# Patient Record
Sex: MALE | Race: WHITE | Employment: UNEMPLOYED | URBAN - METROPOLITAN AREA
[De-identification: names, ages, dates, MRNs, and addresses within clinical notes are randomized per-mention and may not be internally consistent; named-entity substitution may affect disease eponyms.]

---

## 2021-12-06 ENCOUNTER — HOSPITAL ENCOUNTER (OUTPATIENT)
Age: 56
Setting detail: OBSERVATION
Discharge: HOME | End: 2021-12-10
Attending: EMERGENCY MEDICINE | Admitting: INTERNAL MEDICINE
Payer: MEDICAID

## 2021-12-06 ENCOUNTER — APPOINTMENT (OUTPATIENT)
Dept: CT IMAGING | Age: 56
End: 2021-12-06
Attending: EMERGENCY MEDICINE
Payer: MEDICAID

## 2021-12-06 DIAGNOSIS — I95.89 HYPOTENSION DUE TO HYPOVOLEMIA: ICD-10-CM

## 2021-12-06 DIAGNOSIS — R19.7 DIARRHEA, UNSPECIFIED TYPE: Primary | ICD-10-CM

## 2021-12-06 DIAGNOSIS — K52.9 NONINFECTIOUS GASTROENTERITIS, UNSPECIFIED TYPE: ICD-10-CM

## 2021-12-06 DIAGNOSIS — E86.1 HYPOTENSION DUE TO HYPOVOLEMIA: ICD-10-CM

## 2021-12-06 DIAGNOSIS — F41.9 ANXIETY: ICD-10-CM

## 2021-12-06 LAB
ALBUMIN SERPL-MCNC: 3.5 G/DL (ref 3.5–5)
ALBUMIN/GLOB SERPL: 1.2 {RATIO} (ref 1.1–2.2)
ALP SERPL-CCNC: 56 U/L (ref 45–117)
ALT SERPL-CCNC: 27 U/L (ref 12–78)
ANION GAP SERPL CALC-SCNC: 13 MMOL/L (ref 5–15)
AST SERPL W P-5'-P-CCNC: 48 U/L (ref 15–37)
BASOPHILS # BLD: 0 K/UL (ref 0–0.1)
BASOPHILS NFR BLD: 0 % (ref 0–1)
BILIRUB SERPL-MCNC: 1 MG/DL (ref 0.2–1)
BUN SERPL-MCNC: 37 MG/DL (ref 6–20)
BUN/CREAT SERPL: 19 (ref 12–20)
CA-I BLD-MCNC: 8.6 MG/DL (ref 8.5–10.1)
CHLORIDE SERPL-SCNC: 96 MMOL/L (ref 97–108)
CO2 SERPL-SCNC: 21 MMOL/L (ref 21–32)
CREAT SERPL-MCNC: 1.93 MG/DL (ref 0.7–1.3)
DIFFERENTIAL METHOD BLD: ABNORMAL
EOSINOPHIL # BLD: 0 K/UL (ref 0–0.4)
EOSINOPHIL NFR BLD: 0 % (ref 0–7)
ERYTHROCYTE [DISTWIDTH] IN BLOOD BY AUTOMATED COUNT: 14.3 % (ref 11.5–14.5)
GLOBULIN SER CALC-MCNC: 2.9 G/DL (ref 2–4)
GLUCOSE SERPL-MCNC: 115 MG/DL (ref 65–100)
HCT VFR BLD AUTO: 35.7 % (ref 36.6–50.3)
HGB BLD-MCNC: 12.4 G/DL (ref 12.1–17)
IMM GRANULOCYTES # BLD AUTO: 0 K/UL (ref 0–0.04)
IMM GRANULOCYTES NFR BLD AUTO: 1 % (ref 0–0.5)
LACTATE SERPL-SCNC: 1.2 MMOL/L (ref 0.4–2)
LYMPHOCYTES # BLD: 0.7 K/UL (ref 0.8–3.5)
LYMPHOCYTES NFR BLD: 10 % (ref 12–49)
MCH RBC QN AUTO: 33.2 PG (ref 26–34)
MCHC RBC AUTO-ENTMCNC: 34.7 G/DL (ref 30–36.5)
MCV RBC AUTO: 95.7 FL (ref 80–99)
MONOCYTES # BLD: 0.9 K/UL (ref 0–1)
MONOCYTES NFR BLD: 12 % (ref 5–13)
NEUTS SEG # BLD: 5.6 K/UL (ref 1.8–8)
NEUTS SEG NFR BLD: 77 % (ref 32–75)
NRBC # BLD: 0.02 K/UL (ref 0–0.01)
NRBC BLD-RTO: 0.3 PER 100 WBC
PLATELET # BLD AUTO: 145 K/UL (ref 150–400)
PMV BLD AUTO: 10.3 FL (ref 8.9–12.9)
POTASSIUM SERPL-SCNC: 2.7 MMOL/L (ref 3.5–5.1)
PROT SERPL-MCNC: 6.4 G/DL (ref 6.4–8.2)
RBC # BLD AUTO: 3.73 M/UL (ref 4.1–5.7)
SODIUM SERPL-SCNC: 130 MMOL/L (ref 136–145)
TROPONIN-HIGH SENSITIVITY: 17 NG/L (ref 0–76)
WBC # BLD AUTO: 7.3 K/UL (ref 4.1–11.1)

## 2021-12-06 PROCEDURE — 96365 THER/PROPH/DIAG IV INF INIT: CPT

## 2021-12-06 PROCEDURE — 96361 HYDRATE IV INFUSION ADD-ON: CPT

## 2021-12-06 PROCEDURE — 83605 ASSAY OF LACTIC ACID: CPT

## 2021-12-06 PROCEDURE — 93005 ELECTROCARDIOGRAM TRACING: CPT

## 2021-12-06 PROCEDURE — 74176 CT ABD & PELVIS W/O CONTRAST: CPT

## 2021-12-06 PROCEDURE — 85025 COMPLETE CBC W/AUTO DIFF WBC: CPT

## 2021-12-06 PROCEDURE — 80053 COMPREHEN METABOLIC PANEL: CPT

## 2021-12-06 PROCEDURE — 99285 EMERGENCY DEPT VISIT HI MDM: CPT

## 2021-12-06 PROCEDURE — 74011250636 HC RX REV CODE- 250/636: Performed by: EMERGENCY MEDICINE

## 2021-12-06 PROCEDURE — 70450 CT HEAD/BRAIN W/O DYE: CPT

## 2021-12-06 PROCEDURE — 36415 COLL VENOUS BLD VENIPUNCTURE: CPT

## 2021-12-06 PROCEDURE — 96360 HYDRATION IV INFUSION INIT: CPT

## 2021-12-06 PROCEDURE — 84484 ASSAY OF TROPONIN QUANT: CPT

## 2021-12-06 RX ORDER — POTASSIUM CHLORIDE 7.45 MG/ML
10 INJECTION INTRAVENOUS ONCE
Status: COMPLETED | OUTPATIENT
Start: 2021-12-06 | End: 2021-12-07

## 2021-12-06 RX ADMIN — POTASSIUM CHLORIDE 10 MEQ: 7.46 INJECTION, SOLUTION INTRAVENOUS at 23:23

## 2021-12-06 RX ADMIN — SODIUM CHLORIDE 1000 ML: 9 INJECTION, SOLUTION INTRAVENOUS at 21:06

## 2021-12-06 RX ADMIN — SODIUM CHLORIDE 1000 ML: 9 INJECTION, SOLUTION INTRAVENOUS at 21:28

## 2021-12-06 NOTE — Clinical Note
Status[de-identified] INPATIENT [101]   Type of Bed: Remote Telemetry [29]   Cardiac Monitoring Required?: Yes   Inpatient Hospitalization Certified Necessary for the Following Reasons: 1.  Patient Failed outpatient treatment (further clarification in H&P documentation)   Admitting Diagnosis: Colitis [776602]   Admitting Physician: Lavon Vu   Attending Physician: Lavon Vu   Estimated Length of Stay: 3-4 Midnights   Discharge Plan[de-identified] Home with Office Follow-up

## 2021-12-07 PROBLEM — R57.1 HYPOVOLEMIC SHOCK (HCC): Status: ACTIVE | Noted: 2021-12-07

## 2021-12-07 PROBLEM — K52.9 COLITIS: Status: ACTIVE | Noted: 2021-12-07

## 2021-12-07 LAB
AMPHET UR QL SCN: NEGATIVE
ANION GAP SERPL CALC-SCNC: 10 MMOL/L (ref 5–15)
APPEARANCE UR: ABNORMAL
ATRIAL RATE: 94 BPM
ATRIAL RATE: 99 BPM
BACTERIA URNS QL MICRO: NEGATIVE /HPF
BARBITURATES UR QL SCN: NEGATIVE
BENZODIAZ UR QL: NEGATIVE
BILIRUB UR QL: NEGATIVE
BUN SERPL-MCNC: 38 MG/DL (ref 6–20)
BUN/CREAT SERPL: 20 (ref 12–20)
C DIFF TOX GENS STL QL NAA+PROBE: NEGATIVE
CA-I BLD-MCNC: 8.5 MG/DL (ref 8.5–10.1)
CALCULATED P AXIS, ECG09: 20 DEGREES
CALCULATED P AXIS, ECG09: 48 DEGREES
CALCULATED R AXIS, ECG10: -35 DEGREES
CALCULATED R AXIS, ECG10: -37 DEGREES
CALCULATED T AXIS, ECG11: 41 DEGREES
CALCULATED T AXIS, ECG11: 57 DEGREES
CANNABINOIDS UR QL SCN: NEGATIVE
CHLORIDE SERPL-SCNC: 101 MMOL/L (ref 97–108)
CO2 SERPL-SCNC: 23 MMOL/L (ref 21–32)
COCAINE UR QL SCN: NEGATIVE
COLOR UR: ABNORMAL
CREAT SERPL-MCNC: 1.91 MG/DL (ref 0.7–1.3)
DIAGNOSIS, 93000: NORMAL
DIAGNOSIS, 93000: NORMAL
DRUG SCRN COMMENT,DRGCM: NORMAL
GLUCOSE SERPL-MCNC: 95 MG/DL (ref 65–100)
GLUCOSE UR STRIP.AUTO-MCNC: NEGATIVE MG/DL
HGB UR QL STRIP: ABNORMAL
KETONES UR QL STRIP.AUTO: 5 MG/DL
LEUKOCYTE ESTERASE UR QL STRIP.AUTO: NEGATIVE
METHADONE UR QL: NEGATIVE
NITRITE UR QL STRIP.AUTO: NEGATIVE
OPIATES UR QL: NEGATIVE
P-R INTERVAL, ECG05: 154 MS
P-R INTERVAL, ECG05: 158 MS
PCP UR QL: NEGATIVE
PH UR STRIP: 5 [PH] (ref 5–8)
POTASSIUM SERPL-SCNC: 3 MMOL/L (ref 3.5–5.1)
PROT UR STRIP-MCNC: 30 MG/DL
Q-T INTERVAL, ECG07: 374 MS
Q-T INTERVAL, ECG07: 384 MS
QRS DURATION, ECG06: 92 MS
QRS DURATION, ECG06: 94 MS
QTC CALCULATION (BEZET), ECG08: 467 MS
QTC CALCULATION (BEZET), ECG08: 492 MS
RBC #/AREA URNS HPF: ABNORMAL /HPF (ref 0–5)
SODIUM SERPL-SCNC: 134 MMOL/L (ref 136–145)
SP GR UR REFRACTOMETRY: 1.01 (ref 1–1.03)
TROPONIN-HIGH SENSITIVITY: 15 NG/L (ref 0–76)
UROBILINOGEN UR QL STRIP.AUTO: 0.1 EU/DL (ref 0.1–1)
VENTRICULAR RATE, ECG03: 94 BPM
VENTRICULAR RATE, ECG03: 99 BPM
WBC URNS QL MICRO: ABNORMAL /HPF (ref 0–4)

## 2021-12-07 PROCEDURE — 93005 ELECTROCARDIOGRAM TRACING: CPT

## 2021-12-07 PROCEDURE — 96361 HYDRATE IV INFUSION ADD-ON: CPT

## 2021-12-07 PROCEDURE — 96367 TX/PROPH/DG ADDL SEQ IV INF: CPT

## 2021-12-07 PROCEDURE — 74011250637 HC RX REV CODE- 250/637: Performed by: EMERGENCY MEDICINE

## 2021-12-07 PROCEDURE — 65270000029 HC RM PRIVATE

## 2021-12-07 PROCEDURE — G0378 HOSPITAL OBSERVATION PER HR: HCPCS

## 2021-12-07 PROCEDURE — 74011000258 HC RX REV CODE- 258: Performed by: EMERGENCY MEDICINE

## 2021-12-07 PROCEDURE — 80048 BASIC METABOLIC PNL TOTAL CA: CPT

## 2021-12-07 PROCEDURE — 87493 C DIFF AMPLIFIED PROBE: CPT

## 2021-12-07 PROCEDURE — 81001 URINALYSIS AUTO W/SCOPE: CPT

## 2021-12-07 PROCEDURE — 87209 SMEAR COMPLEX STAIN: CPT

## 2021-12-07 PROCEDURE — 96372 THER/PROPH/DIAG INJ SC/IM: CPT

## 2021-12-07 PROCEDURE — 74011250636 HC RX REV CODE- 250/636: Performed by: INTERNAL MEDICINE

## 2021-12-07 PROCEDURE — 74011250637 HC RX REV CODE- 250/637: Performed by: INTERNAL MEDICINE

## 2021-12-07 PROCEDURE — 74011250636 HC RX REV CODE- 250/636: Performed by: EMERGENCY MEDICINE

## 2021-12-07 PROCEDURE — 80307 DRUG TEST PRSMV CHEM ANLYZR: CPT

## 2021-12-07 RX ORDER — FOLIC ACID 1 MG/1
1 TABLET ORAL DAILY
COMMUNITY
End: 2022-06-27

## 2021-12-07 RX ORDER — LOPERAMIDE HYDROCHLORIDE 2 MG/1
2 CAPSULE ORAL AS NEEDED
Status: DISCONTINUED | OUTPATIENT
Start: 2021-12-07 | End: 2021-12-10 | Stop reason: HOSPADM

## 2021-12-07 RX ORDER — LISINOPRIL 40 MG/1
40 TABLET ORAL DAILY
COMMUNITY
End: 2021-12-10

## 2021-12-07 RX ORDER — ONDANSETRON 2 MG/ML
4 INJECTION INTRAMUSCULAR; INTRAVENOUS
Status: DISCONTINUED | OUTPATIENT
Start: 2021-12-07 | End: 2021-12-10 | Stop reason: HOSPADM

## 2021-12-07 RX ORDER — SODIUM CHLORIDE 9 MG/ML
100 INJECTION, SOLUTION INTRAVENOUS CONTINUOUS
Status: DISCONTINUED | OUTPATIENT
Start: 2021-12-07 | End: 2021-12-09

## 2021-12-07 RX ORDER — ENOXAPARIN SODIUM 100 MG/ML
40 INJECTION SUBCUTANEOUS EVERY 24 HOURS
Status: DISCONTINUED | OUTPATIENT
Start: 2021-12-07 | End: 2021-12-10 | Stop reason: HOSPADM

## 2021-12-07 RX ORDER — TRAZODONE HYDROCHLORIDE 50 MG/1
100 TABLET ORAL
Status: DISCONTINUED | OUTPATIENT
Start: 2021-12-08 | End: 2021-12-10 | Stop reason: HOSPADM

## 2021-12-07 RX ORDER — SODIUM CHLORIDE 0.9 % (FLUSH) 0.9 %
5-40 SYRINGE (ML) INJECTION EVERY 8 HOURS
Status: DISCONTINUED | OUTPATIENT
Start: 2021-12-07 | End: 2021-12-07

## 2021-12-07 RX ORDER — LANOLIN ALCOHOL/MO/W.PET/CERES
CREAM (GRAM) TOPICAL DAILY
COMMUNITY
End: 2022-06-27

## 2021-12-07 RX ORDER — SODIUM CHLORIDE 0.9 % (FLUSH) 0.9 %
5-40 SYRINGE (ML) INJECTION AS NEEDED
Status: DISCONTINUED | OUTPATIENT
Start: 2021-12-07 | End: 2021-12-10 | Stop reason: HOSPADM

## 2021-12-07 RX ORDER — HYDROGEN PEROXIDE 3 %
20 SOLUTION, NON-ORAL MISCELLANEOUS DAILY
COMMUNITY
End: 2022-06-27 | Stop reason: DRUGHIGH

## 2021-12-07 RX ORDER — VANCOMYCIN HYDROCHLORIDE 125 MG/1
125 CAPSULE ORAL
Status: COMPLETED | OUTPATIENT
Start: 2021-12-07 | End: 2021-12-07

## 2021-12-07 RX ORDER — DICYCLOMINE HYDROCHLORIDE 20 MG/1
20 TABLET ORAL
Status: DISCONTINUED | OUTPATIENT
Start: 2021-12-07 | End: 2021-12-10 | Stop reason: HOSPADM

## 2021-12-07 RX ORDER — LORAZEPAM 2 MG/1
2 TABLET ORAL DAILY
COMMUNITY
End: 2021-12-10

## 2021-12-07 RX ORDER — POTASSIUM CHLORIDE 750 MG/1
40 TABLET, FILM COATED, EXTENDED RELEASE ORAL EVERY 4 HOURS
Status: COMPLETED | OUTPATIENT
Start: 2021-12-07 | End: 2021-12-07

## 2021-12-07 RX ADMIN — SODIUM CHLORIDE 125 ML/HR: 9 INJECTION, SOLUTION INTRAVENOUS at 03:53

## 2021-12-07 RX ADMIN — POTASSIUM CHLORIDE 40 MEQ: 750 TABLET, FILM COATED, EXTENDED RELEASE ORAL at 13:58

## 2021-12-07 RX ADMIN — TRAZODONE HYDROCHLORIDE 100 MG: 50 TABLET ORAL at 23:55

## 2021-12-07 RX ADMIN — ENOXAPARIN SODIUM 40 MG: 100 INJECTION SUBCUTANEOUS at 10:49

## 2021-12-07 RX ADMIN — LOPERAMIDE HYDROCHLORIDE 2 MG: 2 CAPSULE ORAL at 17:24

## 2021-12-07 RX ADMIN — SODIUM CHLORIDE 1000 ML: 9 INJECTION, SOLUTION INTRAVENOUS at 02:00

## 2021-12-07 RX ADMIN — VANCOMYCIN HYDROCHLORIDE 125 MG: 125 CAPSULE ORAL at 05:26

## 2021-12-07 RX ADMIN — PIPERACILLIN AND TAZOBACTAM 3.38 G: 3; .375 INJECTION, POWDER, LYOPHILIZED, FOR SOLUTION INTRAVENOUS at 03:51

## 2021-12-07 RX ADMIN — POTASSIUM CHLORIDE 40 MEQ: 750 TABLET, FILM COATED, EXTENDED RELEASE ORAL at 10:49

## 2021-12-07 NOTE — PROGRESS NOTES
12/7/21. PCP is Anthony Osman - seen a month ago. Pt lives @ home with his mother Yunier Lyle @ [phone]) . Mother will transport pt home upon discharge. Pt uses no DME/no home health.

## 2021-12-07 NOTE — PROGRESS NOTES
Skin Assessment: Pt. Skin is warm to the touch. Pt. Has edema to his left hand from a fall prior to admission. Pt. Also has bruising to his left knee and abrasion to his right knee due to fall as well. Pt. Had no pressure ulcers noted upon admission.

## 2021-12-07 NOTE — ED NOTES
TRANSFER - OUT REPORT:    Verbal report given to Damion(name) on Luiz Fuentes  being transferred to 5e (unit) for routine progression of care       Report consisted of patients Situation, Background, Assessment and   Recommendations(SBAR). Information from the following report(s) SBAR and ED Summary was reviewed with the receiving nurse. Lines:   Peripheral IV 12/06/21 Right Antecubital (Active)       Peripheral IV 12/06/21 Anterior; Left Forearm (Active)        Opportunity for questions and clarification was provided.       Patient transported with:   [employer]

## 2021-12-07 NOTE — ED TRIAGE NOTES
Pt has weakness, hypotension, standing up and seeing black spots, LOC fall x5 today, Vomiting and Diarrhea x4 days. HX of ETOH daily, no alcohol in  5 days.

## 2021-12-07 NOTE — PROGRESS NOTES
Reason for Admission:  Colitis                     RUR Score:  7%                   Plan for utilizing home health:  None/uses no DME/no home health. PCP: First and Last name:  Palak Zimmerman     Name of Practice:    Are you a current patient: Yes/No: Yes   Approximate date of last visit:1 month ago. Can you participate in a virtual visit with your PCP: Yes?call?has cell phone. Current Advanced Directive/Advance Care Plan: No Order      Healthcare Decision Maker:              Primary Decision Maker: Jerelissa Jerrymaria l - Mother - [phone]                  Transition of Care Plan:     D/C Plan is home with mother & she will transport pt home upon discharge.

## 2021-12-07 NOTE — ED PROVIDER NOTES
EMERGENCY DEPARTMENT HISTORY AND PHYSICAL EXAM      Date: 12/6/2021  Patient Name: Tee Hsu    History of Presenting Illness     Chief Complaint   Patient presents with    Hypotension    Dizziness    Fall       History Provided By: Patient   HPI: Tee Hsu, 60 y.o. male with a past medical history significant No significant past medical history presents to the ED with cc of dizziness. Patient ports associated for days history of nausea vomiting diarrhea, as well as diffuse abdominal pain. Patient denies fevers or chills. Patient states the dizziness occurs when he goes to stand or move, it improves with rest.    There are no other complaints, changes, or physical findings at this time. PCP: None    No current facility-administered medications on file prior to encounter. No current outpatient medications on file prior to encounter. Past History     Past Medical History:  History reviewed. No pertinent past medical history. Past Surgical History:  History reviewed. No pertinent surgical history. Family History:  No family history on file. Social History:  Social History     Tobacco Use    Smoking status: Not on file    Smokeless tobacco: Not on file   Substance Use Topics    Alcohol use: Not on file    Drug use: Not on file       Allergies:  No Known Allergies      Review of Systems   Review of Systems   Constitutional: Negative for chills and fever. HENT: Negative for sinus pressure and sinus pain. Eyes: Negative for photophobia and redness. Respiratory: Negative for shortness of breath and wheezing. Cardiovascular: Negative for chest pain and palpitations. Gastrointestinal: Positive for abdominal pain and nausea. Genitourinary: Negative for flank pain and hematuria. Musculoskeletal: Negative for arthralgias and gait problem. Skin: Negative for color change and pallor. Neurological: Positive for dizziness and weakness.      Review of Systems    Physical Exam   Physical Exam  Constitutional:       General: No acute distress. Appearance: Normal appearance. Not toxic-appearing. HENT:      Head: Normocephalic and atraumatic. Nose: Nose normal.      Mouth/Throat:      Mouth: Mucous membranes are moist.   Eyes:      Extraocular Movements: Extraocular movements intact. Pupils: Pupils are equal, round, and reactive to light. Cardiovascular:      Rate and Rhythm: Normal rate. Pulses: Normal pulses. Pulmonary:      Effort: Pulmonary effort is normal.      Breath sounds: No stridor. Abdominal:      General: Abdomen is flat. There is no distension. Soft without guarding or rebound. Musculoskeletal:         General: Normal range of motion. Cervical back: Normal range of motion and neck supple. Skin:     General: Skin is warm and dry. Capillary Refill: Capillary refill takes less than 2 seconds. Neurological:      General: No focal deficit present. Cranial nerves II through XII intact     Mental Status: Aert and oriented to person, place, and time. Psychiatric:         Mood and Affect: Mood normal.         Behavior: Behavior normal.       Physical Exam    Lab and Diagnostic Study Results     Labs -     Recent Results (from the past 12 hour(s))   CBC WITH AUTOMATED DIFF    Collection Time: 12/06/21  9:13 PM   Result Value Ref Range    WBC 7.3 4.1 - 11.1 K/uL    RBC 3.73 (L) 4.10 - 5.70 M/uL    HGB 12.4 12.1 - 17.0 g/dL    HCT 35.7 (L) 36.6 - 50.3 %    MCV 95.7 80.0 - 99.0 FL    MCH 33.2 26.0 - 34.0 PG    MCHC 34.7 30.0 - 36.5 g/dL    RDW 14.3 11.5 - 14.5 %    PLATELET 197 (L) 775 - 400 K/uL    MPV 10.3 8.9 - 12.9 FL    NRBC 0.3 (H) 0.0  WBC    ABSOLUTE NRBC 0.02 (H) 0.00 - 0.01 K/uL    NEUTROPHILS 77 (H) 32 - 75 %    LYMPHOCYTES 10 (L) 12 - 49 %    MONOCYTES 12 5 - 13 %    EOSINOPHILS 0 0 - 7 %    BASOPHILS 0 0 - 1 %    IMMATURE GRANULOCYTES 1 (H) 0 - 0.5 %    ABS. NEUTROPHILS 5.6 1.8 - 8.0 K/UL    ABS. LYMPHOCYTES 0.7 (L) 0.8 - 3.5 K/UL    ABS. MONOCYTES 0.9 0.0 - 1.0 K/UL    ABS. EOSINOPHILS 0.0 0.0 - 0.4 K/UL    ABS. BASOPHILS 0.0 0.0 - 0.1 K/UL    ABS. IMM. GRANS. 0.0 0.00 - 0.04 K/UL    DF AUTOMATED     METABOLIC PANEL, COMPREHENSIVE    Collection Time: 12/06/21  9:13 PM   Result Value Ref Range    Sodium 130 (L) 136 - 145 mmol/L    Potassium 2.7 (LL) 3.5 - 5.1 mmol/L    Chloride 96 (L) 97 - 108 mmol/L    CO2 21 21 - 32 mmol/L    Anion gap 13 5 - 15 mmol/L    Glucose 115 (H) 65 - 100 mg/dL    BUN 37 (H) 6 - 20 mg/dL    Creatinine 1.93 (H) 0.70 - 1.30 mg/dL    BUN/Creatinine ratio 19 12 - 20      GFR est AA 44 (L) >60 ml/min/1.73m2    GFR est non-AA 36 (L) >60 ml/min/1.73m2    Calcium 8.6 8.5 - 10.1 mg/dL    Bilirubin, total 1.0 0.2 - 1.0 mg/dL    AST (SGOT) 48 (H) 15 - 37 U/L    ALT (SGPT) 27 12 - 78 U/L    Alk. phosphatase 56 45 - 117 U/L    Protein, total 6.4 6.4 - 8.2 g/dL    Albumin 3.5 3.5 - 5.0 g/dL    Globulin 2.9 2.0 - 4.0 g/dL    A-G Ratio 1.2 1.1 - 2.2     LACTIC ACID    Collection Time: 12/06/21  9:15 PM   Result Value Ref Range    Lactic acid 1.2 0.4 - 2.0 mmol/L   TROPONIN-HIGH SENSITIVITY    Collection Time: 12/06/21  9:20 PM   Result Value Ref Range    Troponin-High Sensitivity 17 0 - 76 ng/L       Radiologic Studies -   @lastxrresult@  CT Results  (Last 48 hours)               12/06/21 2324  CT HEAD WO CONT Final result    Impression:      No acute intracranial bleed. Atherosclerosis. Follow-up as clinically indicated. Narrative:  CT head without contrast       Dose reduction: All CT scans at this facility are performed using dose reduction   optimization techniques as appropriate to a performed exam including the   following: Automated exposure control, adjustments of the mA and/or kV according   to patient size, or use of iterative reconstruction technique.        Indication: Dizziness       Findings:       No acute intracranial bleed, mass effect, midline shift or extra-axial fluid collection is identified. Lateral ventricles are normal in size. Gray-white   matter differentiation is maintained. A noncontrast head CT does not exclude the   possibility of an acute ischemic stroke, or subtle parenchymal or vascular   abnormalities. MRI is more sensitive for evaluation of brain parenchyma. No   skull fracture. Mild mucosal thickening/small mucus retention cyst right   maxillary sinus. No fluid in the mastoid air cells. Atherosclerosis. 12/06/21 2324  CT ABD PELV WO CONT Final result    Impression:      Apparent mild colonic wall thickening. Recommend correlation to exclude the   possibility of colitis/enteritis. Fatty liver. Atherosclerosis. Small hiatal hernia. Gynecomastia. Follow-up as   clinically indicated. Please see full report. Narrative:  CT abdomen and pelvis without contrast       Dose reduction: All CT scans at this facility are performed using dose reduction   optimization techniques as appropriate to a performed exam including the   following: Automated exposure control, adjustments of the mA and/or kV according   to patient size, or use of iterative reconstruction technique. INDICATION: Abdominal pain       FINDINGS:       No consolidation in the lung bases. Bilateral gynecomastia. Probable coronary   calcification. Fatty infiltration of the liver. Liver is slightly enlarged. Gallbladder is present without pericholecystic stranding. Spleen and pancreas   are unremarkable on this noncontrast study. Small hiatal hernia. No urinary stone or hydronephrosis on either side. No aneurysm of abdominal   aorta. Mild atherosclerosis. No bowel obstruction. No evidence of diverticulitis or appendicitis. Apparent   mild colonic wall thickening. No abscess. No free intraperitoneal air. Prostate   measures about 4 x 3 cm in diameter containing small calcifications. No free   fluid in the pelvis. No acute fracture in the visualized bony structures.  Cystic areas in the proximal right femur may be due to osteoarthritic changes or other. Tiny periumbilical defect containing fat. CXR Results  (Last 48 hours)    None            Medical Decision Making   - I am the first provider for this patient. - I reviewed the vital signs, available nursing notes, past medical history, past surgical history, family history and social history. - Initial assessment performed. The patients presenting problems have been discussed, and they are in agreement with the care plan formulated and outlined with them. I have encouraged them to ask questions as they arise throughout their visit. Vital Signs-Reviewed the patient's vital signs. Patient Vitals for the past 12 hrs:   Temp Pulse Resp BP SpO2   12/06/21 2328  94 15 (!) 73/52 100 %   12/06/21 2200  85 18 (!) 79/56 100 %   12/06/21 2116  89 18 (!) 71/48 100 %   12/06/21 2052 98.2 °F (36.8 °C) 99 24 (!) 61/47 100 %           Disposition   Disposition: Admitted to Floor Medical Floor the case was discussed with the admitting physician Dr Liam Hardin    Admitted      Diagnosis     Clinical Impression:   1. Diarrhea, unspecified type    2. Hypotension due to hypovolemia    3. Noninfectious gastroenteritis, unspecified type        Attestations:    Guerda Junior MD    Please note that this dictation was completed with [employer]ot[employer], the computer voice recognition software. Quite often unanticipated grammatical, syntax, homophones, and other interpretive errors are inadvertently transcribed by the computer software. Please disregard these errors. Please excuse any errors that have escaped final proofreading. Thank you.

## 2021-12-07 NOTE — ACP (ADVANCE CARE PLANNING)
Advance Care Planning   Healthcare Decision Maker:       Primary Decision Maker: Marijane Klinefelter - Mother - [phone]

## 2021-12-07 NOTE — H&P
History and Physical    Patient: Deretha Bumpers MRN: [id number]  SSN: [id number]    YOB: 1965  Age: 60 y.o. Sex: male      Subjective:      Deretha Bumpers is a 60 y.o. male who developed diarrhea and vomiting at home multiple times with syncopal episodes emergency department patient was very hypotensive started on multiple bags of fluids. Patient admits to eating out. Patient had syncopal attacks multiple denies any fever or chills denies passing any blood abdominal CT showed colitis    History reviewed. No pertinent past medical history. History reviewed. No pertinent surgical history. No family history on file. Social History     Tobacco Use    Smoking status: Not on file    Smokeless tobacco: Not on file   Substance Use Topics    Alcohol use: Not on file      Prior to Admission medications    Not on File        No Known Allergies    Review of Systems:  A comprehensive review of systems was negative except for that written in the History of Present Illness. Objective:     Vitals:    12/07/21 0430 12/07/21 0530 12/07/21 0640 12/07/21 0800   BP: (!) 82/59 (!) 80/61 (!) 89/55 (P) 98/72   Pulse: 91 85 79 (P) 86   Resp: 14 17 14 (P) 16   Temp:       SpO2: 99% 98% 100% (P) 99%   Weight:       Height:            Physical Exam:  General:  Alert, cooperative, no distress, appears stated age. Eyes:  Conjunctivae/corneas clear. PERRL, EOMs intact. Fundi benign   Ears:  Normal TMs and external ear canals both ears. Nose: Nares normal. Septum midline. Mucosa normal. No drainage or sinus tenderness. Mouth/Throat: Lips, mucosa, and tongue normal. Teeth and gums normal.   Neck: Supple, symmetrical, trachea midline, no adenopathy, thyroid: no enlargment/tenderness/nodules, no carotid bruit and no JVD. Back:   Symmetric, no curvature. ROM normal. No CVA tenderness. Lungs:   Clear to auscultation bilaterally.    Heart:  Regular rate and rhythm, S1, S2 normal, no murmur, click, rub or gallop. Abdomen:   Soft, non-tender. Bowel sounds normal. No masses,  No organomegaly. Extremities: Extremities normal, atraumatic, no cyanosis or edema. Pulses: 2+ and symmetric all extremities. Skin: Skin color, texture, turgor normal. No rashes or lesions   Lymph nodes: Cervical, supraclavicular, and axillary nodes normal.   Neurologic: CNII-XII intact. Normal strength, sensation and reflexes throughout. Recent Results (from the past 24 hour(s))   EKG, 12 LEAD, INITIAL    Collection Time: 12/06/21  8:51 PM   Result Value Ref Range    Ventricular Rate 99 BPM    Atrial Rate 99 BPM    P-R Interval 154 ms    QRS Duration 94 ms    Q-T Interval 384 ms    QTC Calculation (Bezet) 492 ms    Calculated P Axis 48 degrees    Calculated R Axis -37 degrees    Calculated T Axis 57 degrees    Diagnosis       Normal sinus rhythm  Possible Left atrial enlargement  Left axis deviation  Low voltage QRS  Prolonged QT  Abnormal ECG  No previous ECGs available  Confirmed by Kia Kim ([id number]) on 12/7/2021 9:06:36 AM     CBC WITH AUTOMATED DIFF    Collection Time: 12/06/21  9:13 PM   Result Value Ref Range    WBC 7.3 4.1 - 11.1 K/uL    RBC 3.73 (L) 4.10 - 5.70 M/uL    HGB 12.4 12.1 - 17.0 g/dL    HCT 35.7 (L) 36.6 - 50.3 %    MCV 95.7 80.0 - 99.0 FL    MCH 33.2 26.0 - 34.0 PG    MCHC 34.7 30.0 - 36.5 g/dL    RDW 14.3 11.5 - 14.5 %    PLATELET 876 (L) 016 - 400 K/uL    MPV 10.3 8.9 - 12.9 FL    NRBC 0.3 (H) 0.0  WBC    ABSOLUTE NRBC 0.02 (H) 0.00 - 0.01 K/uL    NEUTROPHILS 77 (H) 32 - 75 %    LYMPHOCYTES 10 (L) 12 - 49 %    MONOCYTES 12 5 - 13 %    EOSINOPHILS 0 0 - 7 %    BASOPHILS 0 0 - 1 %    IMMATURE GRANULOCYTES 1 (H) 0 - 0.5 %    ABS. NEUTROPHILS 5.6 1.8 - 8.0 K/UL    ABS. LYMPHOCYTES 0.7 (L) 0.8 - 3.5 K/UL    ABS. MONOCYTES 0.9 0.0 - 1.0 K/UL    ABS. EOSINOPHILS 0.0 0.0 - 0.4 K/UL    ABS. BASOPHILS 0.0 0.0 - 0.1 K/UL    ABS. IMM.  GRANS. 0.0 0.00 - 0.04 K/UL    DF AUTOMATED     METABOLIC PANEL, COMPREHENSIVE Collection Time: 12/06/21  9:13 PM   Result Value Ref Range    Sodium 130 (L) 136 - 145 mmol/L    Potassium 2.7 (LL) 3.5 - 5.1 mmol/L    Chloride 96 (L) 97 - 108 mmol/L    CO2 21 21 - 32 mmol/L    Anion gap 13 5 - 15 mmol/L    Glucose 115 (H) 65 - 100 mg/dL    BUN 37 (H) 6 - 20 mg/dL    Creatinine 1.93 (H) 0.70 - 1.30 mg/dL    BUN/Creatinine ratio 19 12 - 20      GFR est AA 44 (L) >60 ml/min/1.73m2    GFR est non-AA 36 (L) >60 ml/min/1.73m2    Calcium 8.6 8.5 - 10.1 mg/dL    Bilirubin, total 1.0 0.2 - 1.0 mg/dL    AST (SGOT) 48 (H) 15 - 37 U/L    ALT (SGPT) 27 12 - 78 U/L    Alk. phosphatase 56 45 - 117 U/L    Protein, total 6.4 6.4 - 8.2 g/dL    Albumin 3.5 3.5 - 5.0 g/dL    Globulin 2.9 2.0 - 4.0 g/dL    A-G Ratio 1.2 1.1 - 2.2     LACTIC ACID    Collection Time: 12/06/21  9:15 PM   Result Value Ref Range    Lactic acid 1.2 0.4 - 2.0 mmol/L   TROPONIN-HIGH SENSITIVITY    Collection Time: 12/06/21  9:20 PM   Result Value Ref Range    Troponin-High Sensitivity 17 0 - 76 ng/L   EKG, 12 LEAD, SUBSEQUENT    Collection Time: 12/07/21  4:09 AM   Result Value Ref Range    Ventricular Rate 94 BPM    Atrial Rate 94 BPM    P-R Interval 158 ms    QRS Duration 92 ms    Q-T Interval 374 ms    QTC Calculation (Bezet) 467 ms    Calculated P Axis 20 degrees    Calculated R Axis -35 degrees    Calculated T Axis 41 degrees    Diagnosis       Normal sinus rhythm  Left axis deviation  Low voltage QRS  Otherwise normal ECG  When compared with ECG of 06-DEC-2021 20:51, (Unconfirmed)  No significant change was found    Confirmed by Melita Leblanc ([id number]) on 12/7/2021 9:19:26 AM       CT Results  (Last 48 hours)               12/06/21 2324  CT HEAD WO CONT Final result    Impression:      No acute intracranial bleed. Atherosclerosis. Follow-up as clinically indicated.        Narrative:  CT head without contrast       Dose reduction: All CT scans at this facility are performed using dose reduction   optimization techniques as appropriate to a performed exam including the   following: Automated exposure control, adjustments of the mA and/or kV according   to patient size, or use of iterative reconstruction technique. Indication: Dizziness       Findings:       No acute intracranial bleed, mass effect, midline shift or extra-axial fluid   collection is identified. Lateral ventricles are normal in size. Gray-white   matter differentiation is maintained. A noncontrast head CT does not exclude the   possibility of an acute ischemic stroke, or subtle parenchymal or vascular   abnormalities. MRI is more sensitive for evaluation of brain parenchyma. No   skull fracture. Mild mucosal thickening/small mucus retention cyst right   maxillary sinus. No fluid in the mastoid air cells. Atherosclerosis. 12/06/21 2324  CT ABD PELV WO CONT Final result    Impression:      Apparent mild colonic wall thickening. Recommend correlation to exclude the   possibility of colitis/enteritis. Fatty liver. Atherosclerosis. Small hiatal hernia. Gynecomastia. Follow-up as   clinically indicated. Please see full report. Narrative:  CT abdomen and pelvis without contrast       Dose reduction: All CT scans at this facility are performed using dose reduction   optimization techniques as appropriate to a performed exam including the   following: Automated exposure control, adjustments of the mA and/or kV according   to patient size, or use of iterative reconstruction technique. INDICATION: Abdominal pain       FINDINGS:       No consolidation in the lung bases. Bilateral gynecomastia. Probable coronary   calcification. Fatty infiltration of the liver. Liver is slightly enlarged. Gallbladder is present without pericholecystic stranding. Spleen and pancreas   are unremarkable on this noncontrast study. Small hiatal hernia. No urinary stone or hydronephrosis on either side. No aneurysm of abdominal   aorta. Mild atherosclerosis. No bowel obstruction. No evidence of diverticulitis or appendicitis. Apparent   mild colonic wall thickening. No abscess. No free intraperitoneal air. Prostate   measures about 4 x 3 cm in diameter containing small calcifications. No free   fluid in the pelvis. No acute fracture in the visualized bony structures. Cystic   areas in the proximal right femur may be due to osteoarthritic changes or other. Tiny periumbilical defect containing fat.                    Assessment:     Hospital Problems  Never Reviewed          Codes Class Noted POA    Colitis ICD-10-CM: K52.9  ICD-9-CM: 558.9  12/7/2021 Unknown            Possible acute gastroenteritis  Hypovolemic shock  Possible food poisoning  Acute renal failure possible  Plan:     continue with IV fluids  Replenish electrolyte loss  Empiric treatment with Flagyl  Critical care time of 30 minutes    Signed By: Osmin Ennis MD     December 7, 2021

## 2021-12-08 LAB
ANION GAP SERPL CALC-SCNC: 8 MMOL/L (ref 5–15)
BUN SERPL-MCNC: 25 MG/DL (ref 6–20)
BUN/CREAT SERPL: 34 (ref 12–20)
CA-I BLD-MCNC: 8.9 MG/DL (ref 8.5–10.1)
CHLORIDE SERPL-SCNC: 109 MMOL/L (ref 97–108)
CO2 SERPL-SCNC: 23 MMOL/L (ref 21–32)
CREAT SERPL-MCNC: 0.74 MG/DL (ref 0.7–1.3)
ERYTHROCYTE [DISTWIDTH] IN BLOOD BY AUTOMATED COUNT: 14 % (ref 11.5–14.5)
GLUCOSE SERPL-MCNC: 91 MG/DL (ref 65–100)
HCT VFR BLD AUTO: 32.4 % (ref 36.6–50.3)
HGB BLD-MCNC: 11.2 G/DL (ref 12.1–17)
MCH RBC QN AUTO: 33.3 PG (ref 26–34)
MCHC RBC AUTO-ENTMCNC: 34.6 G/DL (ref 30–36.5)
MCV RBC AUTO: 96.4 FL (ref 80–99)
NRBC # BLD: 0 K/UL (ref 0–0.01)
NRBC BLD-RTO: 0 PER 100 WBC
PLATELET # BLD AUTO: 138 K/UL (ref 150–400)
PMV BLD AUTO: 10.1 FL (ref 8.9–12.9)
POTASSIUM SERPL-SCNC: 3.1 MMOL/L (ref 3.5–5.1)
RBC # BLD AUTO: 3.36 M/UL (ref 4.1–5.7)
SODIUM SERPL-SCNC: 140 MMOL/L (ref 136–145)
WBC # BLD AUTO: 4.4 K/UL (ref 4.1–11.1)

## 2021-12-08 PROCEDURE — 74011250637 HC RX REV CODE- 250/637: Performed by: INTERNAL MEDICINE

## 2021-12-08 PROCEDURE — 96372 THER/PROPH/DIAG INJ SC/IM: CPT

## 2021-12-08 PROCEDURE — 65270000029 HC RM PRIVATE

## 2021-12-08 PROCEDURE — 80048 BASIC METABOLIC PNL TOTAL CA: CPT

## 2021-12-08 PROCEDURE — 74011250636 HC RX REV CODE- 250/636: Performed by: INTERNAL MEDICINE

## 2021-12-08 PROCEDURE — 36415 COLL VENOUS BLD VENIPUNCTURE: CPT

## 2021-12-08 PROCEDURE — 85027 COMPLETE CBC AUTOMATED: CPT

## 2021-12-08 PROCEDURE — G0378 HOSPITAL OBSERVATION PER HR: HCPCS

## 2021-12-08 RX ORDER — POTASSIUM CHLORIDE 750 MG/1
40 TABLET, FILM COATED, EXTENDED RELEASE ORAL DAILY
Status: DISCONTINUED | OUTPATIENT
Start: 2021-12-09 | End: 2021-12-09

## 2021-12-08 RX ORDER — CHLORDIAZEPOXIDE HYDROCHLORIDE 25 MG/1
25 CAPSULE, GELATIN COATED ORAL
Status: DISCONTINUED | OUTPATIENT
Start: 2021-12-08 | End: 2021-12-10 | Stop reason: HOSPADM

## 2021-12-08 RX ADMIN — ENOXAPARIN SODIUM 40 MG: 100 INJECTION SUBCUTANEOUS at 10:09

## 2021-12-08 RX ADMIN — TRAZODONE HYDROCHLORIDE 100 MG: 50 TABLET ORAL at 21:23

## 2021-12-08 RX ADMIN — SODIUM CHLORIDE 100 ML/HR: 9 INJECTION, SOLUTION INTRAVENOUS at 17:15

## 2021-12-08 NOTE — PROGRESS NOTES
Patient mother disclosed that the patient Venkatesh Gonzalez has problem with alcohol and that she wanted resources to hopefully refer her son to. Patient mother states that patient is in total denial and chooses not talk about it. Nurse reached out to CM and they gave me a resource packet to give to the mother.

## 2021-12-09 LAB
ANION GAP SERPL CALC-SCNC: 9 MMOL/L (ref 5–15)
BUN SERPL-MCNC: 16 MG/DL (ref 6–20)
BUN/CREAT SERPL: 26 (ref 12–20)
CA-I BLD-MCNC: 9.3 MG/DL (ref 8.5–10.1)
CHLORIDE SERPL-SCNC: 108 MMOL/L (ref 97–108)
CO2 SERPL-SCNC: 24 MMOL/L (ref 21–32)
CREAT SERPL-MCNC: 0.62 MG/DL (ref 0.7–1.3)
GLUCOSE SERPL-MCNC: 90 MG/DL (ref 65–100)
POTASSIUM SERPL-SCNC: 2.9 MMOL/L (ref 3.5–5.1)
SODIUM SERPL-SCNC: 141 MMOL/L (ref 136–145)

## 2021-12-09 PROCEDURE — 74011250636 HC RX REV CODE- 250/636: Performed by: INTERNAL MEDICINE

## 2021-12-09 PROCEDURE — 96372 THER/PROPH/DIAG INJ SC/IM: CPT

## 2021-12-09 PROCEDURE — 65270000029 HC RM PRIVATE

## 2021-12-09 PROCEDURE — G0378 HOSPITAL OBSERVATION PER HR: HCPCS

## 2021-12-09 PROCEDURE — 36415 COLL VENOUS BLD VENIPUNCTURE: CPT

## 2021-12-09 PROCEDURE — 96361 HYDRATE IV INFUSION ADD-ON: CPT

## 2021-12-09 PROCEDURE — 80048 BASIC METABOLIC PNL TOTAL CA: CPT

## 2021-12-09 PROCEDURE — 74011250637 HC RX REV CODE- 250/637: Performed by: INTERNAL MEDICINE

## 2021-12-09 RX ORDER — AMLODIPINE BESYLATE 5 MG/1
5 TABLET ORAL DAILY
Status: DISCONTINUED | OUTPATIENT
Start: 2021-12-10 | End: 2021-12-10 | Stop reason: HOSPADM

## 2021-12-09 RX ADMIN — ENOXAPARIN SODIUM 40 MG: 100 INJECTION SUBCUTANEOUS at 09:47

## 2021-12-09 RX ADMIN — SODIUM CHLORIDE 100 ML/HR: 9 INJECTION, SOLUTION INTRAVENOUS at 03:21

## 2021-12-09 RX ADMIN — TRAZODONE HYDROCHLORIDE 100 MG: 50 TABLET ORAL at 23:19

## 2021-12-09 RX ADMIN — POTASSIUM CHLORIDE 40 MEQ: 750 TABLET, EXTENDED RELEASE ORAL at 09:47

## 2021-12-09 NOTE — PROGRESS NOTES
Progress Note    Patient: Orville Taylor MRN: [id number]  SSN: [id number]    YOB: 1965  Age: 60 y.o. Sex: male      Admit Date: 12/6/2021    LOS: 1 day     Subjective:     60years old with acute gastroenteritis and volume depletion report of history of alcohol use patient claims to have some improvement    Objective:     Vitals:    12/08/21 0400 12/08/21 0845 12/08/21 1501 12/08/21 1916   BP:  128/84 131/86 (!) 153/98   Pulse: 85 94 90 98   Resp:  18 18 18   Temp:  97.6 °F (36.4 °C) 97.9 °F (36.6 °C) 97.9 °F (36.6 °C)   SpO2:  97% 98% 99%   Weight:       Height:            Intake and Output:  Current Shift: No intake/output data recorded. Last three shifts: No intake/output data recorded. Physical Exam:   General:  Alert, cooperative, no distress, appears stated age. Eyes:  Conjunctivae/corneas clear. PERRL, EOMs intact. Fundi benign   Ears:  Normal TMs and external ear canals both ears. Nose: Nares normal. Septum midline. Mucosa normal. No drainage or sinus tenderness. Mouth/Throat: Lips, mucosa, and tongue normal. Teeth and gums normal.   Neck: Supple, symmetrical, trachea midline, no adenopathy, thyroid: no enlargment/tenderness/nodules, no carotid bruit and no JVD. Back:   Symmetric, no curvature. ROM normal. No CVA tenderness. Lungs:   Clear to auscultation bilaterally. Heart:  Regular rate and rhythm, S1, S2 normal, no murmur, click, rub or gallop. Abdomen:   Soft, non-tender. Bowel sounds normal. No masses,  No organomegaly. Extremities: Extremities normal, atraumatic, no cyanosis or edema. Pulses: 2+ and symmetric all extremities. Skin: Skin color, texture, turgor normal. No rashes or lesions   Lymph nodes: Cervical, supraclavicular, and axillary nodes normal.   Neurologic: CNII-XII intact. Normal strength, sensation and reflexes throughout. Lab/Data Review: All lab results for the last 24 hours reviewed.      Recent Results (from the past 24 hour(s)) METABOLIC PANEL, BASIC    Collection Time: 12/08/21  7:19 AM   Result Value Ref Range    Sodium 140 136 - 145 mmol/L    Potassium 3.1 (L) 3.5 - 5.1 mmol/L    Chloride 109 (H) 97 - 108 mmol/L    CO2 23 21 - 32 mmol/L    Anion gap 8 5 - 15 mmol/L    Glucose 91 65 - 100 mg/dL    BUN 25 (H) 6 - 20 mg/dL    Creatinine 0.74 0.70 - 1.30 mg/dL    BUN/Creatinine ratio 34 (H) 12 - 20      GFR est AA >60 >60 ml/min/1.73m2    GFR est non-AA >60 >60 ml/min/1.73m2    Calcium 8.9 8.5 - 10.1 mg/dL   CBC W/O DIFF    Collection Time: 12/08/21  7:19 AM   Result Value Ref Range    WBC 4.4 4.1 - 11.1 K/uL    RBC 3.36 (L) 4.10 - 5.70 M/uL    HGB 11.2 (L) 12.1 - 17.0 g/dL    HCT 32.4 (L) 36.6 - 50.3 %    MCV 96.4 80.0 - 99.0 FL    MCH 33.3 26.0 - 34.0 PG    MCHC 34.6 30.0 - 36.5 g/dL    RDW 14.0 11.5 - 14.5 %    PLATELET 895 (L) 373 - 400 K/uL    MPV 10.1 8.9 - 12.9 FL    NRBC 0.0 0.0  WBC    ABSOLUTE NRBC 0.00 0.00 - 0.01 K/uL         Assessment:     Active Problems:    Colitis (12/7/2021)      Hypovolemic shock ([address] (12/7/2021)    Volume depletion    Plan:      For possible discharge in a.m. and outpatient follow-up with GI    Signed By: Glory Dias MD     December 8, 2021

## 2021-12-09 NOTE — PROGRESS NOTES
Chart reviewed. CM will continue to follow patient and recs of medical team.     10:15A: CM met with patient at bedside regarding the hospital having his correct insurance information. Patient has [employer] card with him in the room. CM confirmed the same information was listed in the computer. Patient verbalized understanding and had no further questions.

## 2021-12-09 NOTE — PROGRESS NOTES
[facility] Report included SBAR, MAR, Kardex, Intake/Output, and Accordion.

## 2021-12-09 NOTE — PROGRESS NOTES
Progress Note    Patient: Zheng Uribe MRN: [id number]  SSN: [id number]    YOB: 1965  Age: 60 y.o. Sex: male      Admit Date: 12/6/2021    LOS: 2 days     Subjective:   60years old. Gastroenteritis, history of alcohol abuse he was better wants  diet improved    Objective:     Vitals:    12/08/21 1916 12/08/21 2259 12/09/21 0745 12/09/21 1443   BP: (!) 153/98 (!) 159/103 (!) 164/98 (!) 149/99   Pulse: 98 80 95 86   Resp: 18 18 16 18   Temp: 97.9 °F (36.6 °C) 97.9 °F (36.6 °C) 97.7 °F (36.5 °C) 98 °F (36.7 °C)   SpO2: 99% 96% 98% 98%   Weight:       Height:            Intake and Output:  Current Shift: No intake/output data recorded. Last three shifts: No intake/output data recorded. Physical Exam:   General:  Alert, cooperative, no distress, appears stated age. Eyes:  Conjunctivae/corneas clear. PERRL, EOMs intact. Fundi benign   Ears:  Normal TMs and external ear canals both ears. Nose: Nares normal. Septum midline. Mucosa normal. No drainage or sinus tenderness. Mouth/Throat: Lips, mucosa, and tongue normal. Teeth and gums normal.   Neck: Supple, symmetrical, trachea midline, no adenopathy, thyroid: no enlargment/tenderness/nodules, no carotid bruit and no JVD. Back:   Symmetric, no curvature. ROM normal. No CVA tenderness. Lungs:   Clear to auscultation bilaterally. Heart:  Regular rate and rhythm, S1, S2 normal, no murmur, click, rub or gallop. Abdomen:   Soft, non-tender. Bowel sounds normal. No masses,  No organomegaly. Extremities: Extremities normal, atraumatic, no cyanosis or edema. Pulses: 2+ and symmetric all extremities. Skin: Skin color, texture, turgor normal. No rashes or lesions   Lymph nodes: Cervical, supraclavicular, and axillary nodes normal.   Neurologic: CNII-XII intact. Normal strength, sensation and reflexes throughout. Lab/Data Review: All lab results for the last 24 hours reviewed.          Assessment:     Active Problems:    Colitis (12/7/2021)      Hypovolemic shock ([facility] [address] (12/7/2021)    Gastroenteritis    Hypertension  Plan:     Diet advance  Possible discharge in a.m.   Adjust medications    Signed By: Juan J Barron MD     December 9, 2021

## 2021-12-09 NOTE — PROGRESS NOTES
Problem: Falls - Risk of  Goal: *Absence of Falls  Description: Document Kaylan Zhong Fall Risk and appropriate interventions in the flowsheet. Outcome: Progressing Towards Goal  Note: Fall Risk Interventions:                      History of Falls Interventions: Investigate reason for fall         Problem: Patient Education: Go to Patient Education Activity  Goal: Patient/Family Education  Outcome: Progressing Towards Goal     Problem: Patient Education:  Go to Education Activity  Goal: Patient/Family Education  Outcome: Progressing Towards Goal     Problem: Patient Education: Go to Patient Education Activity  Goal: Patient/Family Education  Outcome: Progressing Towards Goal     Problem: Patient Education:  Go to Education Activity  Goal: Patient/Family Education  Outcome: Progressing Towards Goal     Problem: Risk for Spread of Infection  Goal: Prevent transmission of infectious organism to others  Description: Prevent the transmission of infectious organisms to other patients, staff members, and visitors.   Outcome: Resolved/Met

## 2021-12-09 NOTE — PROGRESS NOTES
Case management needs to discuss with the patient about billing concerns. Patient is no longer with [employer], he is currently with [employer].

## 2021-12-10 VITALS
TEMPERATURE: 98.1 F | HEART RATE: 113 BPM | HEIGHT: 67 IN | SYSTOLIC BLOOD PRESSURE: 128 MMHG | RESPIRATION RATE: 18 BRPM | OXYGEN SATURATION: 97 % | BODY MASS INDEX: 25.9 KG/M2 | WEIGHT: 165 LBS | DIASTOLIC BLOOD PRESSURE: 88 MMHG

## 2021-12-10 LAB
ANION GAP SERPL CALC-SCNC: 7 MMOL/L (ref 5–15)
BUN SERPL-MCNC: 10 MG/DL (ref 6–20)
BUN/CREAT SERPL: 16 (ref 12–20)
CA-I BLD-MCNC: 9.6 MG/DL (ref 8.5–10.1)
CHLORIDE SERPL-SCNC: 105 MMOL/L (ref 97–108)
CO2 SERPL-SCNC: 29 MMOL/L (ref 21–32)
CREAT SERPL-MCNC: 0.62 MG/DL (ref 0.7–1.3)
GLUCOSE SERPL-MCNC: 96 MG/DL (ref 65–100)
O+P SPEC MICRO: NORMAL
O+P STL CONC: NORMAL
POTASSIUM SERPL-SCNC: 3.1 MMOL/L (ref 3.5–5.1)
SODIUM SERPL-SCNC: 141 MMOL/L (ref 136–145)
SPECIMEN SOURCE: NORMAL

## 2021-12-10 PROCEDURE — G0378 HOSPITAL OBSERVATION PER HR: HCPCS

## 2021-12-10 PROCEDURE — 36415 COLL VENOUS BLD VENIPUNCTURE: CPT

## 2021-12-10 PROCEDURE — 80048 BASIC METABOLIC PNL TOTAL CA: CPT

## 2021-12-10 PROCEDURE — 74011250637 HC RX REV CODE- 250/637: Performed by: INTERNAL MEDICINE

## 2021-12-10 PROCEDURE — 96361 HYDRATE IV INFUSION ADD-ON: CPT

## 2021-12-10 PROCEDURE — 93005 ELECTROCARDIOGRAM TRACING: CPT

## 2021-12-10 RX ORDER — DICYCLOMINE HYDROCHLORIDE 20 MG/1
20 TABLET ORAL
Qty: 30 TABLET | Refills: 0 | Status: SHIPPED | OUTPATIENT
Start: 2021-12-10 | End: 2022-06-27

## 2021-12-10 RX ORDER — LOPERAMIDE HYDROCHLORIDE 2 MG/1
2 CAPSULE ORAL AS NEEDED
Qty: 15 CAPSULE | Refills: 0 | Status: SHIPPED | OUTPATIENT
Start: 2021-12-10 | End: 2021-12-10

## 2021-12-10 RX ORDER — DICYCLOMINE HYDROCHLORIDE 20 MG/1
20 TABLET ORAL
Qty: 30 TABLET | Refills: 0 | Status: SHIPPED | OUTPATIENT
Start: 2021-12-10 | End: 2021-12-10

## 2021-12-10 RX ORDER — AMLODIPINE BESYLATE 5 MG/1
5 TABLET ORAL DAILY
Qty: 30 TABLET | Refills: 0 | Status: SHIPPED | OUTPATIENT
Start: 2021-12-11 | End: 2021-12-10

## 2021-12-10 RX ORDER — LOPERAMIDE HYDROCHLORIDE 2 MG/1
2 CAPSULE ORAL AS NEEDED
Qty: 15 CAPSULE | Refills: 0 | Status: SHIPPED | OUTPATIENT
Start: 2021-12-10 | End: 2021-12-20

## 2021-12-10 RX ORDER — TRAZODONE HYDROCHLORIDE 100 MG/1
100 TABLET ORAL
Qty: 10 TABLET | Refills: 0 | Status: SHIPPED | OUTPATIENT
Start: 2021-12-10 | End: 2022-06-27

## 2021-12-10 RX ORDER — TRAZODONE HYDROCHLORIDE 100 MG/1
100 TABLET ORAL
Qty: 10 TABLET | Refills: 0 | Status: SHIPPED | OUTPATIENT
Start: 2021-12-10 | End: 2021-12-10

## 2021-12-10 RX ORDER — CHLORDIAZEPOXIDE HYDROCHLORIDE 25 MG/1
25 CAPSULE, GELATIN COATED ORAL
Qty: 30 CAPSULE | Refills: 0 | Status: SHIPPED | OUTPATIENT
Start: 2021-12-10 | End: 2022-06-27

## 2021-12-10 RX ORDER — POTASSIUM CHLORIDE 750 MG/1
40 TABLET, FILM COATED, EXTENDED RELEASE ORAL
Status: COMPLETED | OUTPATIENT
Start: 2021-12-10 | End: 2021-12-10

## 2021-12-10 RX ORDER — AMLODIPINE BESYLATE 5 MG/1
5 TABLET ORAL DAILY
Qty: 30 TABLET | Refills: 0 | Status: SHIPPED | OUTPATIENT
Start: 2021-12-11 | End: 2022-06-27

## 2021-12-10 RX ORDER — CHLORDIAZEPOXIDE HYDROCHLORIDE 25 MG/1
25 CAPSULE, GELATIN COATED ORAL
Qty: 30 CAPSULE | Refills: 0 | Status: SHIPPED | OUTPATIENT
Start: 2021-12-10 | End: 2021-12-10

## 2021-12-10 RX ADMIN — POTASSIUM CHLORIDE 40 MEQ: 750 TABLET, FILM COATED, EXTENDED RELEASE ORAL at 11:44

## 2021-12-10 RX ADMIN — AMLODIPINE BESYLATE 5 MG: 5 TABLET ORAL at 08:07

## 2021-12-10 NOTE — PROGRESS NOTES
Problem: Falls - Risk of  Goal: *Absence of Falls  Description: Document Kumar Sol Fall Risk and appropriate interventions in the flowsheet.   Outcome: Progressing Towards Goal  Note: Fall Risk Interventions:            Medication Interventions: Patient to call before getting OOB         History of Falls Interventions: Bed/chair exit alarm

## 2021-12-10 NOTE — DISCHARGE SUMMARY
Discharge Summary     Patient: Janette Haywood MRN: [id number]  SSN: [id number]    YOB: 1965  Age: 60 y.o. Sex: male       Admit Date: 12/6/2021    Discharge Date: 12/10/2021      Admission Diagnoses: Colitis [K52.9]  Hypovolemic shock ([facility] [address] [R57.1]    Discharge Diagnoses:   Problem List as of 12/10/2021 Never Reviewed          Codes Class Noted - Resolved    Colitis ICD-10-CM: K52.9  ICD-9-CM: 558.9  12/7/2021 - Present        Hypovolemic shock ([address] ICD-10-CM: R57.1  ICD-9-CM: 785.59  12/7/2021 - Present               Discharge Condition: Good    Hospital Course: Patient was admitted for severe gastroenteritis with volume depletion and hypokalemia patient has a history of heavy alcohol use was placed on alcohol detox protocol. He was also treated for hypovolemic shock and a CT of the abdomen showed colitis. Patient is being discharged in a stable state vital signs are stable with primary medical physician    Consults: None    Significant Diagnostic Studies: labs:       CT HEAD WO CONT   Final Result      No acute intracranial bleed. Atherosclerosis. Follow-up as clinically indicated. CT ABD PELV WO CONT   Final Result      Apparent mild colonic wall thickening. Recommend correlation to exclude the   possibility of colitis/enteritis. Fatty liver. Atherosclerosis. Small hiatal hernia. Gynecomastia. Follow-up as   clinically indicated. Please see full report. Disposition: home    Discharge Medications:   Current Discharge Medication List      START taking these medications    Details   amLODIPine (NORVASC) 5 mg tablet Take 1 Tablet by mouth daily. Qty: 30 Tablet, Refills: 0      chlordiazePOXIDE (LIBRIUM) 25 mg capsule Take 1 Capsule by mouth every four (4) hours as needed for Anxiety.  Max Daily Amount: 150 mg.  Qty: 30 Capsule, Refills: 0    Associated Diagnoses: Anxiety      dicyclomine (BENTYL) 20 mg tablet Take 1 Tablet by mouth every six (6) hours as needed for Pain.  Qty: 30 Tablet, Refills: 0      loperamide (IMODIUM) 2 mg capsule Take 1 Capsule by mouth as needed for Diarrhea (After each loose BM) for up to 10 days. Qty: 15 Capsule, Refills: 0      traZODone (DESYREL) 100 mg tablet Take 1 Tablet by mouth nightly. Qty: 10 Tablet, Refills: 0         CONTINUE these medications which have NOT CHANGED    Details   folic acid (FOLVITE) 1 mg tablet Take 1 mg by mouth daily. thiamine HCL (Vitamin B-1) 100 mg tablet Take  by mouth daily. esomeprazole (NexIUM) 20 mg capsule Take 20 mg by mouth daily.          STOP taking these medications       LORazepam (Ativan) 2 mg tablet Comments:   Reason for Stopping:         lisinopriL (PRINIVIL, ZESTRIL) 40 mg tablet Comments:   Reason for Stopping:               Activity: Activity as tolerated  Diet: Cardiac Diet  Wound Care: None needed    Follow-up Appointments   Procedures    FOLLOW UP VISIT Appointment in: 3 - 5 Days     Standing Status:   Standing     Number of Occurrences:   1     Order Specific Question:   Appointment in     Answer:   3 - 5 Days     30 Minutes discharge time  Signed By: Glory Dias MD     December 10, 2021

## 2021-12-10 NOTE — PROGRESS NOTES
D/C Plan:    -d/c home  -PCP follow up        Patient scheduled to discharge home today. Aware to follow up w/PCP for this inpatient admission. Clear to discharge from SW standpoint.          MERE Deleon

## 2021-12-10 NOTE — INTERDISCIPLINARY ROUNDS
Patient discharged home with self care. IV was removed and telemetry was taken off. Discharge instructions were reviewed with patient by Miguel Severance, RN. Medications were reviewed. Patient wheeled out via wheelchair. Patient was picked up by mother.

## 2021-12-10 NOTE — PROGRESS NOTES
Discharge instructions discussed with patient including medications and follow up appointments. Patient verbalized understanding. No IV or telemetry present at discharge. Primary nurse made aware of patient's heart rate of 113. RN received telephone order from Dr. Branden Tineo discontinue discharge and order an EKG.

## 2021-12-13 LAB
ATRIAL RATE: 92 BPM
CALCULATED P AXIS, ECG09: 21 DEGREES
CALCULATED R AXIS, ECG10: -45 DEGREES
CALCULATED T AXIS, ECG11: 23 DEGREES
DIAGNOSIS, 93000: NORMAL
P-R INTERVAL, ECG05: 146 MS
Q-T INTERVAL, ECG07: 342 MS
QRS DURATION, ECG06: 78 MS
QTC CALCULATION (BEZET), ECG08: 422 MS
VENTRICULAR RATE, ECG03: 92 BPM

## 2022-03-19 PROBLEM — K52.9 COLITIS: Status: ACTIVE | Noted: 2021-12-07

## 2022-03-19 PROBLEM — R57.1 HYPOVOLEMIC SHOCK (HCC): Status: ACTIVE | Noted: 2021-12-07

## 2022-06-27 ENCOUNTER — APPOINTMENT (OUTPATIENT)
Dept: GENERAL RADIOLOGY | Age: 57
DRG: 425 | End: 2022-06-27
Attending: EMERGENCY MEDICINE
Payer: MEDICAID

## 2022-06-27 ENCOUNTER — HOSPITAL ENCOUNTER (INPATIENT)
Age: 57
LOS: 3 days | Discharge: HOME | DRG: 425 | End: 2022-06-30
Attending: EMERGENCY MEDICINE | Admitting: HOSPITALIST
Payer: MEDICAID

## 2022-06-27 DIAGNOSIS — K52.9 COLITIS: ICD-10-CM

## 2022-06-27 DIAGNOSIS — E87.6 HYPOKALEMIA: Primary | ICD-10-CM

## 2022-06-27 PROBLEM — R00.0 TACHYCARDIA: Status: ACTIVE | Noted: 2022-06-27

## 2022-06-27 PROBLEM — I95.9 HYPOTENSION: Status: ACTIVE | Noted: 2022-06-27

## 2022-06-27 LAB
ALBUMIN SERPL-MCNC: 3 G/DL (ref 3.5–5)
ALBUMIN SERPL-MCNC: 3.8 G/DL (ref 3.5–5)
ALBUMIN/GLOB SERPL: 1.1 {RATIO} (ref 1.1–2.2)
ALBUMIN/GLOB SERPL: 1.3 {RATIO} (ref 1.1–2.2)
ALP SERPL-CCNC: 77 U/L (ref 45–117)
ALP SERPL-CCNC: 93 U/L (ref 45–117)
ALT SERPL-CCNC: 39 U/L (ref 12–78)
ALT SERPL-CCNC: 53 U/L (ref 12–78)
ANION GAP SERPL CALC-SCNC: 10 MMOL/L (ref 5–15)
ANION GAP SERPL CALC-SCNC: 16 MMOL/L (ref 5–15)
APTT PPP: 20.8 SEC (ref 22.1–31)
AST SERPL W P-5'-P-CCNC: 53 U/L (ref 15–37)
AST SERPL W P-5'-P-CCNC: 68 U/L (ref 15–37)
BASOPHILS # BLD: 0 K/UL (ref 0–0.1)
BASOPHILS # BLD: 0 K/UL (ref 0–0.1)
BASOPHILS NFR BLD: 0 % (ref 0–1)
BASOPHILS NFR BLD: 0 % (ref 0–1)
BILIRUB SERPL-MCNC: 0.7 MG/DL (ref 0.2–1)
BILIRUB SERPL-MCNC: 1.1 MG/DL (ref 0.2–1)
BUN SERPL-MCNC: 14 MG/DL (ref 6–20)
BUN SERPL-MCNC: 14 MG/DL (ref 6–20)
BUN/CREAT SERPL: 12 (ref 12–20)
BUN/CREAT SERPL: 16 (ref 12–20)
CA-I BLD-MCNC: 8.6 MG/DL (ref 8.5–10.1)
CA-I BLD-MCNC: 9.8 MG/DL (ref 8.5–10.1)
CHLORIDE SERPL-SCNC: 87 MMOL/L (ref 97–108)
CHLORIDE SERPL-SCNC: 93 MMOL/L (ref 97–108)
CO2 SERPL-SCNC: 27 MMOL/L (ref 21–32)
CO2 SERPL-SCNC: 28 MMOL/L (ref 21–32)
CREAT SERPL-MCNC: 0.9 MG/DL (ref 0.7–1.3)
CREAT SERPL-MCNC: 1.16 MG/DL (ref 0.7–1.3)
D DIMER PPP FEU-MCNC: 0.44 MG/L FEU (ref 0.19–0.5)
DIFFERENTIAL METHOD BLD: ABNORMAL
DIFFERENTIAL METHOD BLD: ABNORMAL
EOSINOPHIL # BLD: 0 K/UL (ref 0–0.4)
EOSINOPHIL # BLD: 0 K/UL (ref 0–0.4)
EOSINOPHIL NFR BLD: 0 % (ref 0–7)
EOSINOPHIL NFR BLD: 0 % (ref 0–7)
ERYTHROCYTE [DISTWIDTH] IN BLOOD BY AUTOMATED COUNT: 13.9 % (ref 11.5–14.5)
ERYTHROCYTE [DISTWIDTH] IN BLOOD BY AUTOMATED COUNT: 14 % (ref 11.5–14.5)
ETHANOL SERPL-MCNC: <10 MG/DL
GLOBULIN SER CALC-MCNC: 2.7 G/DL (ref 2–4)
GLOBULIN SER CALC-MCNC: 2.9 G/DL (ref 2–4)
GLUCOSE SERPL-MCNC: 118 MG/DL (ref 65–100)
GLUCOSE SERPL-MCNC: 130 MG/DL (ref 65–100)
HCT VFR BLD AUTO: 30.2 % (ref 36.6–50.3)
HCT VFR BLD AUTO: 36.2 % (ref 36.6–50.3)
HGB BLD-MCNC: 11.3 G/DL (ref 12.1–17)
HGB BLD-MCNC: 13.2 G/DL (ref 12.1–17)
IMM GRANULOCYTES # BLD AUTO: 0 K/UL
IMM GRANULOCYTES # BLD AUTO: 0.2 K/UL (ref 0–0.04)
IMM GRANULOCYTES NFR BLD AUTO: 0 %
IMM GRANULOCYTES NFR BLD AUTO: 2 % (ref 0–0.5)
LYMPHOCYTES # BLD: 1.7 K/UL (ref 0.8–3.5)
LYMPHOCYTES # BLD: 2.8 K/UL (ref 0.8–3.5)
LYMPHOCYTES NFR BLD: 20 % (ref 12–49)
LYMPHOCYTES NFR BLD: 39 % (ref 12–49)
MAGNESIUM SERPL-MCNC: 1.5 MG/DL (ref 1.6–2.4)
MCH RBC QN AUTO: 39.4 PG (ref 26–34)
MCH RBC QN AUTO: 40.1 PG (ref 26–34)
MCHC RBC AUTO-ENTMCNC: 36.5 G/DL (ref 30–36.5)
MCHC RBC AUTO-ENTMCNC: 37.4 G/DL (ref 30–36.5)
MCV RBC AUTO: 107.1 FL (ref 80–99)
MCV RBC AUTO: 108.1 FL (ref 80–99)
MONOCYTES # BLD: 0.7 K/UL (ref 0–1)
MONOCYTES # BLD: 0.9 K/UL (ref 0–1)
MONOCYTES NFR BLD: 10 % (ref 5–13)
MONOCYTES NFR BLD: 9 % (ref 5–13)
MYELOCYTES NFR BLD MANUAL: 2 %
NEUTS SEG # BLD: 3.7 K/UL (ref 1.8–8)
NEUTS SEG # BLD: 5.8 K/UL (ref 1.8–8)
NEUTS SEG NFR BLD: 50 % (ref 32–75)
NEUTS SEG NFR BLD: 68 % (ref 32–75)
NRBC # BLD: 0.03 K/UL (ref 0–0.01)
NRBC BLD-RTO: 0.4 PER 100 WBC
PLATELET # BLD AUTO: 314 K/UL (ref 150–400)
PLATELET # BLD AUTO: 366 K/UL (ref 150–400)
PMV BLD AUTO: 9.6 FL (ref 8.9–12.9)
PMV BLD AUTO: 9.7 FL (ref 8.9–12.9)
POTASSIUM SERPL-SCNC: 2.7 MMOL/L (ref 3.5–5.1)
POTASSIUM SERPL-SCNC: 2.8 MMOL/L (ref 3.5–5.1)
POTASSIUM SERPL-SCNC: 3.4 MMOL/L (ref 3.5–5.1)
PROT SERPL-MCNC: 5.7 G/DL (ref 6.4–8.2)
PROT SERPL-MCNC: 6.7 G/DL (ref 6.4–8.2)
RBC # BLD AUTO: 2.82 M/UL (ref 4.1–5.7)
RBC # BLD AUTO: 3.35 M/UL (ref 4.1–5.7)
RBC MORPH BLD: ABNORMAL
SODIUM SERPL-SCNC: 130 MMOL/L (ref 136–145)
SODIUM SERPL-SCNC: 131 MMOL/L (ref 136–145)
THERAPEUTIC RANGE,PTTT: ABNORMAL SEC (ref 82–109)
TROPONIN-HIGH SENSITIVITY: 12 NG/L (ref 0–76)
WBC # BLD AUTO: 7.3 K/UL (ref 4.1–11.1)
WBC # BLD AUTO: 8.6 K/UL (ref 4.1–11.1)

## 2022-06-27 PROCEDURE — 84132 ASSAY OF SERUM POTASSIUM: CPT

## 2022-06-27 PROCEDURE — 96365 THER/PROPH/DIAG IV INF INIT: CPT

## 2022-06-27 PROCEDURE — 96376 TX/PRO/DX INJ SAME DRUG ADON: CPT

## 2022-06-27 PROCEDURE — 85730 THROMBOPLASTIN TIME PARTIAL: CPT

## 2022-06-27 PROCEDURE — 85025 COMPLETE CBC W/AUTO DIFF WBC: CPT

## 2022-06-27 PROCEDURE — G0378 HOSPITAL OBSERVATION PER HR: HCPCS

## 2022-06-27 PROCEDURE — 96366 THER/PROPH/DIAG IV INF ADDON: CPT

## 2022-06-27 PROCEDURE — 71046 X-RAY EXAM CHEST 2 VIEWS: CPT

## 2022-06-27 PROCEDURE — 93005 ELECTROCARDIOGRAM TRACING: CPT

## 2022-06-27 PROCEDURE — 85379 FIBRIN DEGRADATION QUANT: CPT

## 2022-06-27 PROCEDURE — 74011250636 HC RX REV CODE- 250/636: Performed by: EMERGENCY MEDICINE

## 2022-06-27 PROCEDURE — 84484 ASSAY OF TROPONIN QUANT: CPT

## 2022-06-27 PROCEDURE — 82077 ASSAY SPEC XCP UR&BREATH IA: CPT

## 2022-06-27 PROCEDURE — 36415 COLL VENOUS BLD VENIPUNCTURE: CPT

## 2022-06-27 PROCEDURE — 83735 ASSAY OF MAGNESIUM: CPT

## 2022-06-27 PROCEDURE — 80053 COMPREHEN METABOLIC PANEL: CPT

## 2022-06-27 PROCEDURE — 99285 EMERGENCY DEPT VISIT HI MDM: CPT

## 2022-06-27 PROCEDURE — 74011250637 HC RX REV CODE- 250/637: Performed by: EMERGENCY MEDICINE

## 2022-06-27 PROCEDURE — 96375 TX/PRO/DX INJ NEW DRUG ADDON: CPT

## 2022-06-27 PROCEDURE — 65270000029 HC RM PRIVATE

## 2022-06-27 RX ORDER — KETOROLAC TROMETHAMINE 30 MG/ML
15 INJECTION, SOLUTION INTRAMUSCULAR; INTRAVENOUS ONCE
Status: COMPLETED | OUTPATIENT
Start: 2022-06-27 | End: 2022-06-27

## 2022-06-27 RX ORDER — ONDANSETRON 2 MG/ML
4 INJECTION INTRAMUSCULAR; INTRAVENOUS
Status: DISCONTINUED | OUTPATIENT
Start: 2022-06-27 | End: 2022-06-30 | Stop reason: HOSPADM

## 2022-06-27 RX ORDER — LISINOPRIL 40 MG/1
40 TABLET ORAL DAILY
COMMUNITY
Start: 2022-06-27 | End: 2022-06-30

## 2022-06-27 RX ORDER — SODIUM CHLORIDE 0.9 % (FLUSH) 0.9 %
5-40 SYRINGE (ML) INJECTION EVERY 8 HOURS
Status: DISCONTINUED | OUTPATIENT
Start: 2022-06-27 | End: 2022-06-30 | Stop reason: HOSPADM

## 2022-06-27 RX ORDER — POTASSIUM CHLORIDE 750 MG/1
40 TABLET, FILM COATED, EXTENDED RELEASE ORAL
Status: COMPLETED | OUTPATIENT
Start: 2022-06-27 | End: 2022-06-28

## 2022-06-27 RX ORDER — SODIUM CHLORIDE, SODIUM LACTATE, POTASSIUM CHLORIDE, CALCIUM CHLORIDE 600; 310; 30; 20 MG/100ML; MG/100ML; MG/100ML; MG/100ML
125 INJECTION, SOLUTION INTRAVENOUS CONTINUOUS
Status: DISPENSED | OUTPATIENT
Start: 2022-06-27 | End: 2022-06-28

## 2022-06-27 RX ORDER — POTASSIUM CHLORIDE 1.5 G/1.77G
40 POWDER, FOR SOLUTION ORAL
Status: COMPLETED | OUTPATIENT
Start: 2022-06-27 | End: 2022-06-27

## 2022-06-27 RX ORDER — PANTOPRAZOLE SODIUM 40 MG/1
40 TABLET, DELAYED RELEASE ORAL
Status: DISCONTINUED | OUTPATIENT
Start: 2022-06-28 | End: 2022-06-30 | Stop reason: HOSPADM

## 2022-06-27 RX ORDER — ACETAMINOPHEN 650 MG/1
650 SUPPOSITORY RECTAL
Status: DISCONTINUED | OUTPATIENT
Start: 2022-06-27 | End: 2022-06-30 | Stop reason: HOSPADM

## 2022-06-27 RX ORDER — ASPIRIN 325 MG/1
100 TABLET, FILM COATED ORAL DAILY
Status: DISCONTINUED | OUTPATIENT
Start: 2022-06-28 | End: 2022-06-30 | Stop reason: HOSPADM

## 2022-06-27 RX ORDER — LISINOPRIL 20 MG/1
20 TABLET ORAL DAILY
COMMUNITY
End: 2022-06-27 | Stop reason: DRUGHIGH

## 2022-06-27 RX ORDER — SODIUM CHLORIDE 0.9 % (FLUSH) 0.9 %
5-40 SYRINGE (ML) INJECTION AS NEEDED
Status: DISCONTINUED | OUTPATIENT
Start: 2022-06-27 | End: 2022-06-30 | Stop reason: HOSPADM

## 2022-06-27 RX ORDER — POTASSIUM CHLORIDE 7.45 MG/ML
10 INJECTION INTRAVENOUS
Status: COMPLETED | OUTPATIENT
Start: 2022-06-27 | End: 2022-06-27

## 2022-06-27 RX ORDER — ONDANSETRON 4 MG/1
4 TABLET, ORALLY DISINTEGRATING ORAL
Status: DISCONTINUED | OUTPATIENT
Start: 2022-06-27 | End: 2022-06-30 | Stop reason: HOSPADM

## 2022-06-27 RX ORDER — FOLIC ACID 1 MG/1
1 TABLET ORAL DAILY
Status: DISCONTINUED | OUTPATIENT
Start: 2022-06-28 | End: 2022-06-30 | Stop reason: HOSPADM

## 2022-06-27 RX ORDER — ACETAMINOPHEN 325 MG/1
650 TABLET ORAL
Status: DISCONTINUED | OUTPATIENT
Start: 2022-06-27 | End: 2022-06-30 | Stop reason: HOSPADM

## 2022-06-27 RX ORDER — OMEPRAZOLE 40 MG/1
40 CAPSULE, DELAYED RELEASE ORAL
COMMUNITY
Start: 2022-06-27

## 2022-06-27 RX ORDER — KETOROLAC TROMETHAMINE 30 MG/ML
30 INJECTION, SOLUTION INTRAMUSCULAR; INTRAVENOUS ONCE
Status: DISCONTINUED | OUTPATIENT
Start: 2022-06-27 | End: 2022-06-27

## 2022-06-27 RX ORDER — LORAZEPAM 2 MG/ML
1 INJECTION INTRAMUSCULAR
Status: DISCONTINUED | OUTPATIENT
Start: 2022-06-27 | End: 2022-06-30 | Stop reason: HOSPADM

## 2022-06-27 RX ADMIN — POTASSIUM CHLORIDE 10 MEQ: 7.46 INJECTION, SOLUTION INTRAVENOUS at 19:12

## 2022-06-27 RX ADMIN — POTASSIUM CHLORIDE 10 MEQ: 7.46 INJECTION, SOLUTION INTRAVENOUS at 14:47

## 2022-06-27 RX ADMIN — POTASSIUM CHLORIDE 10 MEQ: 7.46 INJECTION, SOLUTION INTRAVENOUS at 13:49

## 2022-06-27 RX ADMIN — POTASSIUM CHLORIDE 40 MEQ: 1.5 POWDER, FOR SOLUTION ORAL at 13:49

## 2022-06-27 RX ADMIN — POTASSIUM CHLORIDE 10 MEQ: 7.46 INJECTION, SOLUTION INTRAVENOUS at 20:16

## 2022-06-27 RX ADMIN — KETOROLAC TROMETHAMINE 15 MG: 30 INJECTION, SOLUTION INTRAMUSCULAR; INTRAVENOUS at 20:42

## 2022-06-27 RX ADMIN — SODIUM CHLORIDE 1000 ML: 9 INJECTION, SOLUTION INTRAVENOUS at 13:49

## 2022-06-27 RX ADMIN — POTASSIUM CHLORIDE 10 MEQ: 7.46 INJECTION, SOLUTION INTRAVENOUS at 16:57

## 2022-06-27 RX ADMIN — POTASSIUM CHLORIDE 10 MEQ: 7.46 INJECTION, SOLUTION INTRAVENOUS at 18:10

## 2022-06-27 NOTE — ED PROVIDER NOTES
EMERGENCY DEPARTMENT HISTORY AND PHYSICAL EXAM      Date: 6/27/2022  Patient Name: Josselin Patel      History of Presenting Illness     Chief Complaint   Patient presents with    Fall    Dizziness       History Provided By: Patient    HPI: Josselin Patel, 60 y.o. male with a past medical history significant hypertension and EtOH abuse presents to the ED with cc of ongoing dizziness/lightheadedness. Patient reports near syncopal episode on Friday night with subsequent development of left rib pain. He was evaluated at outside facility with negative x-ray. Laboratory evaluation at outside facility demonstrating hypokalemia of 2.5. Patient reports lightheadedness is made worse with position changes and with activity. He denies sensation of room spinning. There are no other complaints, changes, or physical findings at this time. PCP: None    Current Facility-Administered Medications   Medication Dose Route Frequency Provider Last Rate Last Admin    potassium chloride 10 mEq in 100 ml IVPB  10 mEq IntraVENous Q1H Lucinda De Luna, DO         Current Outpatient Medications   Medication Sig Dispense Refill    lisinopriL (PRINIVIL, ZESTRIL) 20 mg tablet Take 20 mg by mouth daily.  esomeprazole (NexIUM) 20 mg capsule Take 20 mg by mouth daily.  amLODIPine (NORVASC) 5 mg tablet Take 1 Tablet by mouth daily. (Patient not taking: Reported on 6/27/2022) 30 Tablet 0    chlordiazePOXIDE (LIBRIUM) 25 mg capsule Take 1 Capsule by mouth every four (4) hours as needed for Anxiety. Max Daily Amount: 150 mg. (Patient not taking: Reported on 6/27/2022) 30 Capsule 0    dicyclomine (BENTYL) 20 mg tablet Take 1 Tablet by mouth every six (6) hours as needed for Pain. (Patient not taking: Reported on 6/27/2022) 30 Tablet 0    traZODone (DESYREL) 100 mg tablet Take 1 Tablet by mouth nightly. (Patient not taking: Reported on 6/27/2022) 10 Tablet 0    folic acid (FOLVITE) 1 mg tablet Take 1 mg by mouth daily. (Patient not taking: Reported on 6/27/2022)      thiamine HCL (Vitamin B-1) 100 mg tablet Take  by mouth daily. (Patient not taking: Reported on 6/27/2022)         Past History     Past Medical History:  Past Medical History:   Diagnosis Date    Gastrointestinal disorder     Hypertension        Past Surgical History:  History reviewed. No pertinent surgical history. Family History:  History reviewed. No pertinent family history. Social History:  Social History     Tobacco Use    Smoking status: Current Every Day Smoker     Packs/day: 0.50    Smokeless tobacco: Never Used   Substance Use Topics    Alcohol use: Yes     Comment: daily    Drug use: Never       Allergies:  No Known Allergies      Review of Systems     Review of Systems   Constitutional: Negative for chills and fever. HENT: Negative for congestion and rhinorrhea. Eyes: Negative for photophobia and visual disturbance. Respiratory: Negative for cough and shortness of breath. Cardiovascular: Negative for chest pain and palpitations. Gastrointestinal: Negative for abdominal pain, diarrhea, nausea and vomiting. Genitourinary: Negative for difficulty urinating and dysuria. Musculoskeletal: Negative for arthralgias and myalgias. Skin: Negative for color change and rash. Neurological: Positive for weakness and light-headedness. Negative for headaches. Psychiatric/Behavioral: Negative for dysphoric mood and sleep disturbance. Physical Exam     Physical Exam  Constitutional:       General: He is not in acute distress. Appearance: Normal appearance. He is not ill-appearing. HENT:      Head: Normocephalic and atraumatic. Right Ear: External ear normal.      Left Ear: External ear normal.      Nose: Nose normal.      Mouth/Throat:      Mouth: Mucous membranes are moist.   Eyes:      Extraocular Movements: Extraocular movements intact.       Conjunctiva/sclera: Conjunctivae normal.      Pupils: Pupils are equal, round, and reactive to light. Cardiovascular:      Rate and Rhythm: Normal rate and regular rhythm. Pulses: Normal pulses. Pulmonary:      Effort: Pulmonary effort is normal. No respiratory distress. Breath sounds: Normal breath sounds. Abdominal:      General: Abdomen is flat. There is no distension. Musculoskeletal:         General: Normal range of motion. Cervical back: Normal range of motion. Skin:     General: Skin is warm and dry. Neurological:      General: No focal deficit present. Mental Status: He is alert and oriented to person, place, and time. Psychiatric:         Mood and Affect: Mood normal.         Behavior: Behavior normal.         Thought Content: Thought content normal.         Judgment: Judgment normal.         Lab and Diagnostic Study Results     Labs -     Recent Results (from the past 12 hour(s))   CBC WITH AUTOMATED DIFF    Collection Time: 06/27/22 12:50 PM   Result Value Ref Range    WBC 8.6 4.1 - 11.1 K/uL    RBC 3.35 (L) 4.10 - 5.70 M/uL    HGB 13.2 12.1 - 17.0 g/dL    HCT 36.2 (L) 36.6 - 50.3 %    .1 (H) 80.0 - 99.0 FL    MCH 39.4 (H) 26.0 - 34.0 PG    MCHC 36.5 30.0 - 36.5 g/dL    RDW 14.0 11.5 - 14.5 %    PLATELET 237 924 - 912 K/uL    MPV 9.7 8.9 - 12.9 FL    NEUTROPHILS 68 32 - 75 %    LYMPHOCYTES 20 12 - 49 %    MONOCYTES 10 5 - 13 %    EOSINOPHILS 0 0 - 7 %    BASOPHILS 0 0 - 1 %    IMMATURE GRANULOCYTES 2 (H) 0.0 - 0.5 %    ABS. NEUTROPHILS 5.8 1.8 - 8.0 K/UL    ABS. LYMPHOCYTES 1.7 0.8 - 3.5 K/UL    ABS. MONOCYTES 0.9 0.0 - 1.0 K/UL    ABS. EOSINOPHILS 0.0 0.0 - 0.4 K/UL    ABS. BASOPHILS 0.0 0.0 - 0.1 K/UL    ABS. IMM.  GRANS. 0.2 (H) 0.00 - 0.04 K/UL    DF AUTOMATED     METABOLIC PANEL, COMPREHENSIVE    Collection Time: 06/27/22 12:50 PM   Result Value Ref Range    Sodium 131 (L) 136 - 145 mmol/L    Potassium 2.8 (L) 3.5 - 5.1 mmol/L    Chloride 87 (L) 97 - 108 mmol/L    CO2 28 21 - 32 mmol/L    Anion gap 16 (H) 5 - 15 mmol/L    Glucose 130 (H) 65 - 100 mg/dL    BUN 14 6 - 20 mg/dL    Creatinine 1.16 0.70 - 1.30 mg/dL    BUN/Creatinine ratio 12 12 - 20      GFR est AA >60 >60 ml/min/1.73m2    GFR est non-AA >60 >60 ml/min/1.73m2    Calcium 9.8 8.5 - 10.1 mg/dL    Bilirubin, total 1.1 (H) 0.2 - 1.0 mg/dL    AST (SGOT) 68 (H) 15 - 37 U/L    ALT (SGPT) 53 12 - 78 U/L    Alk. phosphatase 93 45 - 117 U/L    Protein, total 6.7 6.4 - 8.2 g/dL    Albumin 3.8 3.5 - 5.0 g/dL    Globulin 2.9 2.0 - 4.0 g/dL    A-G Ratio 1.3 1.1 - 2.2     TROPONIN-HIGH SENSITIVITY    Collection Time: 06/27/22 12:50 PM   Result Value Ref Range    Troponin-High Sensitivity 12 0 - 76 ng/L   D DIMER    Collection Time: 06/27/22 12:50 PM   Result Value Ref Range    D-dimer 0.44 0.19 - 0.50 mg/L FEU   PTT    Collection Time: 06/27/22 12:50 PM   Result Value Ref Range    aPTT 20.8 (L) 22.1 - 31.0 sec    aPTT, therapeutic range   82 - 109 sec   ETHYL ALCOHOL    Collection Time: 06/27/22 12:50 PM   Result Value Ref Range    ALCOHOL(ETHYL),SERUM <10 <10 mg/dL   MAGNESIUM    Collection Time: 06/27/22 12:50 PM   Result Value Ref Range    Magnesium 1.5 (L) 1.6 - 2.4 mg/dL   POTASSIUM    Collection Time: 06/27/22  4:02 PM   Result Value Ref Range    Potassium 2.7 (LL) 3.5 - 5.1 mmol/L       Radiologic Studies -   [unfilled]  CT Results  (Last 48 hours)    None        CXR Results  (Last 48 hours)               06/27/22 1308  XR CHEST PA LAT Final result    Narrative:  Chest 2 views. Lungs clear; no interstitial or alveolar pole right edema. Cardiac and   mediastinal structures within normal limits. No pneumothorax or sizable pleural   effusion. Osteopenia. Moderate grade thoracic spondylosis. Medical Decision Making and ED Course   - I am the first and primary provider for this patient AND AM THE PRIMARY PROVIDER OF RECORD. - I reviewed the vital signs, available nursing notes, past medical history, past surgical history, family history and social history.     - Initial assessment performed. The patients presenting problems have been discussed, and the staff are in agreement with the care plan formulated and outlined with them. I have encouraged them to ask questions as they arise throughout their visit. Vital Signs-Reviewed the patient's vital signs. Patient Vitals for the past 12 hrs:   Temp Pulse Resp BP SpO2   06/27/22 1237 98.3 °F (36.8 °C) (!) 132 18 99/62 97 %           Records Reviewed: Nursing Notes    The patient presents with dizziness with a differential diagnosis of  cardiac dysrhythm, dizziness/vertigo, generalized weakness, hypertension, syncope/loss of consciousness and vasovagal episode    EKG interpretation: (Preliminary): Performed at 1248, and read at 1250  Sinus tachycardia with a ventricular rate of 125, , QRS 72, QTc 447 without evidence of ST depression or elevation. ED Course:              Provider Notes (Medical Decision Making):   58-year-old male with past medical history significant for hypertension, EtOH abuse presenting to the emergency department for evaluation of lightheadedness. Physical examination, patient is tachycardic, however otherwise resting comfortably in bed. Remainder vital signs within normal limits. Laboratory evaluation demonstrating hypokalemia with potassium of 2.8. He is given 40mEq of potassium p.o. as well as 20mEq equivalents IV. Recheck showing potassium of 2.7. MDM           Consultations:       Consultations: -  Hospitalist Consultant: Dr. Joaquin Gibbs: We have asked for emergent assistance with regard to this patient. We have discussed the patients HPI, ROS, PE and results this far. They will come and evaluate the patient for admission.         Procedures and Critical Care       Performed by: Ladi Delarosa DO  PROCEDURES:  Procedures         Disposition     Disposition: Admitted to Observation Unit the case was discussed with the admitting physician     Admitted      Diagnosis     Clinical Impression: 1. Hypokalemia        Attestations:    Cintia Payne, DO    Please note that this dictation was completed with [employer], the computer voice recognition software. Quite often unanticipated grammatical, syntax, homophones, and other interpretive errors are inadvertently transcribed by the computer software. Please disregard these errors. Please excuse any errors that have escaped final proofreading. Thank you.

## 2022-06-27 NOTE — ED TRIAGE NOTES
Pt states that he has been having issues with vertigo and has not seen a doctor for it. He is complaining of pain to left rib cage from a fall on Friday trying to get back into bed.

## 2022-06-27 NOTE — ED NOTES
SBAR called by Brayden Barreto RN to Fidelina Escobar RN at [facility] ED.

## 2022-06-27 NOTE — ED NOTES
Assumed care of patient at [phone]. Patient resting quietly on stretcher. Patient has blanket, drink at bedside and call bell in reach.  No complaints

## 2022-06-27 NOTE — Clinical Note
Patient Class[de-identified] OBSERVATION [104]   Type of Bed: Remote Telemetry [29]   Cardiac Monitoring Required?: Yes   Reason for Observation: HypoK   Admitting Diagnosis: Hypokalemia [999990]   Admitting Physician: Vera Duran [[id number]]   Attending Physician: Vera Duran [[id number]]

## 2022-06-28 LAB
ALBUMIN SERPL-MCNC: 3 G/DL (ref 3.5–5)
AMPHET UR QL SCN: NEGATIVE
ANION GAP SERPL CALC-SCNC: 10 MMOL/L (ref 5–15)
BARBITURATES UR QL SCN: NEGATIVE
BENZODIAZ UR QL: NEGATIVE
BUN SERPL-MCNC: 12 MG/DL (ref 6–20)
BUN/CREAT SERPL: 17 (ref 12–20)
CA-I BLD-MCNC: 8.6 MG/DL (ref 8.5–10.1)
CANNABINOIDS UR QL SCN: NEGATIVE
CHLORIDE SERPL-SCNC: 96 MMOL/L (ref 97–108)
CO2 SERPL-SCNC: 27 MMOL/L (ref 21–32)
COCAINE UR QL SCN: NEGATIVE
CREAT SERPL-MCNC: 0.72 MG/DL (ref 0.7–1.3)
DRUG SCRN COMMENT,DRGCM: NORMAL
FOLATE SERPL-MCNC: 2.5 NG/ML (ref 5–21)
GLUCOSE SERPL-MCNC: 100 MG/DL (ref 65–100)
LDH SERPL L TO P-CCNC: 583 U/L (ref 85–241)
METHADONE UR QL: NEGATIVE
OPIATES UR QL: NEGATIVE
PCP UR QL: NEGATIVE
PHOSPHATE SERPL-MCNC: 0.5 MG/DL (ref 2.6–4.7)
POTASSIUM SERPL-SCNC: 3 MMOL/L (ref 3.5–5.1)
RETICS # AUTO: 0.02 M/UL (ref 0.03–0.1)
RETICS/RBC NFR AUTO: 0.9 % (ref 0.7–2.1)
SODIUM SERPL-SCNC: 133 MMOL/L (ref 136–145)
TSH SERPL DL<=0.05 MIU/L-ACNC: 3.39 UIU/ML (ref 0.36–3.74)
VIT B12 SERPL-MCNC: 264 PG/ML (ref 193–986)

## 2022-06-28 PROCEDURE — 83615 LACTATE (LD) (LDH) ENZYME: CPT

## 2022-06-28 PROCEDURE — 65270000029 HC RM PRIVATE

## 2022-06-28 PROCEDURE — 80307 DRUG TEST PRSMV CHEM ANLYZR: CPT

## 2022-06-28 PROCEDURE — 74011250637 HC RX REV CODE- 250/637: Performed by: INTERNAL MEDICINE

## 2022-06-28 PROCEDURE — 82607 VITAMIN B-12: CPT

## 2022-06-28 PROCEDURE — 74011000250 HC RX REV CODE- 250: Performed by: INTERNAL MEDICINE

## 2022-06-28 PROCEDURE — 74011000250 HC RX REV CODE- 250: Performed by: HOSPITALIST

## 2022-06-28 PROCEDURE — 84443 ASSAY THYROID STIM HORMONE: CPT

## 2022-06-28 PROCEDURE — 74011250636 HC RX REV CODE- 250/636: Performed by: INTERNAL MEDICINE

## 2022-06-28 PROCEDURE — 74011250636 HC RX REV CODE- 250/636: Performed by: HOSPITALIST

## 2022-06-28 PROCEDURE — 80069 RENAL FUNCTION PANEL: CPT

## 2022-06-28 PROCEDURE — 83010 ASSAY OF HAPTOGLOBIN QUANT: CPT

## 2022-06-28 PROCEDURE — 74011250637 HC RX REV CODE- 250/637: Performed by: HOSPITALIST

## 2022-06-28 PROCEDURE — 85045 AUTOMATED RETICULOCYTE COUNT: CPT

## 2022-06-28 RX ORDER — FAMOTIDINE 20 MG/1
20 TABLET, FILM COATED ORAL ONCE
Status: COMPLETED | OUTPATIENT
Start: 2022-06-28 | End: 2022-06-28

## 2022-06-28 RX ORDER — MAG HYDROX/ALUMINUM HYD/SIMETH 200-200-20
30 SUSPENSION, ORAL (FINAL DOSE FORM) ORAL
Status: DISCONTINUED | OUTPATIENT
Start: 2022-06-28 | End: 2022-06-30 | Stop reason: HOSPADM

## 2022-06-28 RX ORDER — POTASSIUM CHLORIDE 1.5 G/1.77G
20 POWDER, FOR SOLUTION ORAL 2 TIMES DAILY WITH MEALS
Status: DISCONTINUED | OUTPATIENT
Start: 2022-06-28 | End: 2022-06-29

## 2022-06-28 RX ORDER — CYANOCOBALAMIN 1000 UG/ML
1000 INJECTION, SOLUTION INTRAMUSCULAR; SUBCUTANEOUS DAILY
Status: DISCONTINUED | OUTPATIENT
Start: 2022-06-28 | End: 2022-06-30 | Stop reason: HOSPADM

## 2022-06-28 RX ORDER — LANOLIN ALCOHOL/MO/W.PET/CERES
400 CREAM (GRAM) TOPICAL 2 TIMES DAILY
Status: DISCONTINUED | OUTPATIENT
Start: 2022-06-28 | End: 2022-06-30 | Stop reason: HOSPADM

## 2022-06-28 RX ORDER — TRAMADOL HYDROCHLORIDE 50 MG/1
50 TABLET ORAL
Status: DISCONTINUED | OUTPATIENT
Start: 2022-06-28 | End: 2022-06-30 | Stop reason: HOSPADM

## 2022-06-28 RX ADMIN — POTASSIUM CHLORIDE 40 MEQ: 750 TABLET, FILM COATED, EXTENDED RELEASE ORAL at 00:46

## 2022-06-28 RX ADMIN — Medication 400 MG: at 22:42

## 2022-06-28 RX ADMIN — SODIUM CHLORIDE, POTASSIUM CHLORIDE, SODIUM LACTATE AND CALCIUM CHLORIDE 125 ML/HR: 600; 310; 30; 20 INJECTION, SOLUTION INTRAVENOUS at 05:03

## 2022-06-28 RX ADMIN — TRAMADOL HYDROCHLORIDE 50 MG: 50 TABLET, COATED ORAL at 22:42

## 2022-06-28 RX ADMIN — DIBASIC SODIUM PHOSPHATE, MONOBASIC POTASSIUM PHOSPHATE AND MONOBASIC SODIUM PHOSPHATE 2 TABLET: 852; 155; 130 TABLET ORAL at 14:36

## 2022-06-28 RX ADMIN — DIBASIC SODIUM PHOSPHATE, MONOBASIC POTASSIUM PHOSPHATE AND MONOBASIC SODIUM PHOSPHATE 2 TABLET: 852; 155; 130 TABLET ORAL at 11:31

## 2022-06-28 RX ADMIN — Medication 400 MG: at 15:50

## 2022-06-28 RX ADMIN — POTASSIUM CHLORIDE 20 MEQ: 1.5 POWDER, FOR SOLUTION ORAL at 14:39

## 2022-06-28 RX ADMIN — PANTOPRAZOLE SODIUM 40 MG: 40 TABLET, DELAYED RELEASE ORAL at 09:38

## 2022-06-28 RX ADMIN — FAMOTIDINE 20 MG: 20 TABLET ORAL at 22:42

## 2022-06-28 RX ADMIN — SODIUM CHLORIDE, POTASSIUM CHLORIDE, SODIUM LACTATE AND CALCIUM CHLORIDE 1000 ML: 600; 310; 30; 20 INJECTION, SOLUTION INTRAVENOUS at 00:16

## 2022-06-28 RX ADMIN — THIAMINE HCL TAB 100 MG 100 MG: 100 TAB at 09:38

## 2022-06-28 RX ADMIN — FOLIC ACID 1 MG: 1 TABLET ORAL at 09:38

## 2022-06-28 RX ADMIN — DIBASIC SODIUM PHOSPHATE, MONOBASIC POTASSIUM PHOSPHATE AND MONOBASIC SODIUM PHOSPHATE 2 TABLET: 852; 155; 130 TABLET ORAL at 22:43

## 2022-06-28 RX ADMIN — SODIUM CHLORIDE, PRESERVATIVE FREE 10 ML: 5 INJECTION INTRAVENOUS at 00:43

## 2022-06-28 RX ADMIN — ONDANSETRON 4 MG: 2 INJECTION INTRAMUSCULAR; INTRAVENOUS at 15:06

## 2022-06-28 RX ADMIN — ALUMINUM HYDROXIDE, MAGNESIUM HYDROXIDE, AND SIMETHICONE 30 ML: 200; 200; 20 SUSPENSION ORAL at 15:06

## 2022-06-28 RX ADMIN — POTASSIUM PHOSPHATE, MONOBASIC POTASSIUM PHOSPHATE, DIBASIC: 224; 236 INJECTION, SOLUTION, CONCENTRATE INTRAVENOUS at 11:31

## 2022-06-28 NOTE — ED NOTES
Attempted to call report to the floor since pt is impatiently waiting to go to his room. Nurse unable to accept report at this time and will call ED nurse back.

## 2022-06-28 NOTE — ED NOTES
Patient transferred to [facility] ED via [facility]. Patient ambulated to restroom and stretcher without difficulty. Patient a/o x4. All belongings in possession. Patient transferred with 30mL of IV potassium left on IV pump. [facility] aware. No further questions.

## 2022-06-28 NOTE — H&P
History and Physical              Subjective :   Chief Complaint : Vertigo/dizziness    Source of information : Patient, ED provider, previous records. History of present illness:   60 y.o. male history of hypertension presents to the emergency room complaining of dizziness associated with lightheadedness and almost passing out. When he is trying to walk around he feels so dizzy falling, states he is trying to be close to the bed so that he can fall on the bed. But other night when he fell he hit his iron bed with a significant pain. He denies any chest pain, palpitations. No head injury. He does not lose consciousness during that time. He cannot tell whether he has any balance issue when he is walking. No chest pain, palpitations. No trouble breathing. He is found with potassium of 2.5. Was given potassium supplements. In December he presented almost similar, found with hypokalemia was treated and discharged. He has an EKG with left anterior fascicular block, and CBC macrocytosis with elevated MCV at 108. It was significantly increased from December. From the chart mention of alcohol drinking. States that he drinks maybe 4 to 5 glasses every day which are hard liquor. He does not think he drink a lot. Cannot tell when was the last drink. Negative for alcohol levels on lab work. Past Medical History:   Diagnosis Date    Gastrointestinal disorder     Hypertension    Hyperlipidemia, depression, lumbar herniated disc L4-L5. History of hepatitis C A. Surgical history: Hernia surgery. ,  Appendectomy    Family history: Mother: Alcohol abuse, hypertension  Father: Colorectal cancer. Paternal grandmother has colon cancer. Brother: Diabetes and hyperlipidemia  Multiple family members with alcohol abuse, brother, mother, maternal grandmother and sister.       Social History     Tobacco Use    Smoking status: Current Every Day Smoker     Packs/day: 0.50    Smokeless tobacco: Never Used Substance Use Topics    Alcohol use: Yes     Comment: daily       Prior to Admission medications    Medication Sig Start Date End Date Taking? Authorizing Provider   lisinopriL (PRINIVIL, ZESTRIL) 40 mg tablet Take 40 mg by mouth daily. 6/27/22   Provider, Historical   omeprazole (PRILOSEC) 40 mg capsule Take 40 mg by mouth Daily (before breakfast). 6/27/22   Provider, Historical     No Known Allergies          Review of Systems:  Constitutional: Appetite is good, denies weight loss, no fever, no chills, no night sweats. Eye: No recent visual disturbances, no discharge, no double vision. Ear/nose/mouth/throat : No hearing disturbance, no ear pain, no nasal congestion, no postnasal drainage. No sore throat, no trouble swallowing. Respiratory : No trouble breathing, no cough, ++ shortness of breath with activity, no hemoptysis, no wheezing. Cardiovascular : No chest pain, no palpitation, no orthopnea,  no peripheral edema. Gastrointestinal : No nausea, no vomiting,  No abdominal pain. Genitourinary : No dysuria, no hematuria, denies any weak stream.  No increased frequency, No incontinence. Lymphatics :   Endocrine : No excessive thirst, No polyuria   Immunologic :  No seasonal allergies. Musculoskeletal : No joint swelling, No pain, No effusion,  +++ chronic back pain, No neck pain. Integumentary : No rash, No pruritus,   Hematology : No petechiae, No easy bruising, . Neurology : Denies change in mental status, ++ headache, No confusion, No numbness or tingling. Psychiatric : No mood swings, No anxiety, No depression.     Vitals:     Patient Vitals for the past 12 hrs:   Temp Pulse Resp BP SpO2   06/27/22 2155 98.5 °F (36.9 °C) (!) 110 18 116/79 98 %   06/27/22 2036 -- (!) 107 18 125/80 98 %   06/27/22 1855 98.4 °F (36.9 °C) (!) 116 18 98/64 98 %   06/27/22 1445 98.3 °F (36.8 °C) (!) 128 18 99/66 97 %   06/27/22 1237 98.3 °F (36.8 °C) (!) 132 18 99/62 97 %       Physical Exam:   General : Looks comfortable, no respiratory distress noted. HEENT : PERRLA, normal oral mucosa, atraumatic normocephalic, Normal ear and nose. Neck : Supple, no JVD, no masses noted, no carotid bruit. Lungs : Breath sounds with good air entry bilaterally, no wheezes or rales, no accessory muscle use. CVS : Rhythm rate regular, S1+, S2+, no murmur or gallop. Abdomen : Soft, nontender, no organomegaly, bowel sounds active. Extremities : No edema noted,  pedal pulses palpable. Musculoskeletal : Fair range of motion, no joint swelling or effusion, muscle tone and power appears fair. Skin : Moist, warm, no pathological rash. Lymphatic : No cervical lymphadenopathy. Neurological : Awake, alert, oriented to time place person. Psychiatric : Mood and affect appears appropriate to the situation. Data Review:   Recent Results (from the past 24 hour(s))   CBC WITH AUTOMATED DIFF    Collection Time: 06/27/22 12:50 PM   Result Value Ref Range    WBC 8.6 4.1 - 11.1 K/uL    RBC 3.35 (L) 4.10 - 5.70 M/uL    HGB 13.2 12.1 - 17.0 g/dL    HCT 36.2 (L) 36.6 - 50.3 %    .1 (H) 80.0 - 99.0 FL    MCH 39.4 (H) 26.0 - 34.0 PG    MCHC 36.5 30.0 - 36.5 g/dL    RDW 14.0 11.5 - 14.5 %    PLATELET 553 428 - 885 K/uL    MPV 9.7 8.9 - 12.9 FL    NEUTROPHILS 68 32 - 75 %    LYMPHOCYTES 20 12 - 49 %    MONOCYTES 10 5 - 13 %    EOSINOPHILS 0 0 - 7 %    BASOPHILS 0 0 - 1 %    IMMATURE GRANULOCYTES 2 (H) 0.0 - 0.5 %    ABS. NEUTROPHILS 5.8 1.8 - 8.0 K/UL    ABS. LYMPHOCYTES 1.7 0.8 - 3.5 K/UL    ABS. MONOCYTES 0.9 0.0 - 1.0 K/UL    ABS. EOSINOPHILS 0.0 0.0 - 0.4 K/UL    ABS. BASOPHILS 0.0 0.0 - 0.1 K/UL    ABS. IMM.  GRANS. 0.2 (H) 0.00 - 0.04 K/UL    DF AUTOMATED     METABOLIC PANEL, COMPREHENSIVE    Collection Time: 06/27/22 12:50 PM   Result Value Ref Range    Sodium 131 (L) 136 - 145 mmol/L    Potassium 2.8 (L) 3.5 - 5.1 mmol/L    Chloride 87 (L) 97 - 108 mmol/L    CO2 28 21 - 32 mmol/L    Anion gap 16 (H) 5 - 15 mmol/L    Glucose 130 (H) 65 - 100 mg/dL    BUN 14 6 - 20 mg/dL    Creatinine 1.16 0.70 - 1.30 mg/dL    BUN/Creatinine ratio 12 12 - 20      GFR est AA >60 >60 ml/min/1.73m2    GFR est non-AA >60 >60 ml/min/1.73m2    Calcium 9.8 8.5 - 10.1 mg/dL    Bilirubin, total 1.1 (H) 0.2 - 1.0 mg/dL    AST (SGOT) 68 (H) 15 - 37 U/L    ALT (SGPT) 53 12 - 78 U/L    Alk. phosphatase 93 45 - 117 U/L    Protein, total 6.7 6.4 - 8.2 g/dL    Albumin 3.8 3.5 - 5.0 g/dL    Globulin 2.9 2.0 - 4.0 g/dL    A-G Ratio 1.3 1.1 - 2.2     TROPONIN-HIGH SENSITIVITY    Collection Time: 06/27/22 12:50 PM   Result Value Ref Range    Troponin-High Sensitivity 12 0 - 76 ng/L   D DIMER    Collection Time: 06/27/22 12:50 PM   Result Value Ref Range    D-dimer 0.44 0.19 - 0.50 mg/L FEU   PTT    Collection Time: 06/27/22 12:50 PM   Result Value Ref Range    aPTT 20.8 (L) 22.1 - 31.0 sec    aPTT, therapeutic range   82 - 109 sec   ETHYL ALCOHOL    Collection Time: 06/27/22 12:50 PM   Result Value Ref Range    ALCOHOL(ETHYL),SERUM <10 <10 mg/dL   MAGNESIUM    Collection Time: 06/27/22 12:50 PM   Result Value Ref Range    Magnesium 1.5 (L) 1.6 - 2.4 mg/dL   POTASSIUM    Collection Time: 06/27/22  4:02 PM   Result Value Ref Range    Potassium 2.7 (LL) 3.5 - 5.1 mmol/L   CBC WITH AUTOMATED DIFF    Collection Time: 06/27/22 10:22 PM   Result Value Ref Range    WBC 7.3 4.1 - 11.1 K/uL    RBC 2.82 (L) 4.10 - 5.70 M/uL    HGB 11.3 (L) 12.1 - 17.0 g/dL    HCT 30.2 (L) 36.6 - 50.3 %    .1 (H) 80.0 - 99.0 FL    MCH 40.1 (H) 26.0 - 34.0 PG    MCHC 37.4 (H) 30.0 - 36.5 g/dL    RDW 13.9 11.5 - 14.5 %    PLATELET 434 045 - 216 K/uL    MPV 9.6 8.9 - 12.9 FL    NRBC 0.4 (H) 0.0  WBC    ABSOLUTE NRBC 0.03 (H) 0.00 - 0.01 K/uL    NEUTROPHILS PENDING %    LYMPHOCYTES PENDING %    MONOCYTES PENDING %    EOSINOPHILS PENDING %    BASOPHILS PENDING %    IMMATURE GRANULOCYTES PENDING %    ABS. NEUTROPHILS PENDING K/UL    ABS. LYMPHOCYTES PENDING K/UL    ABS. MONOCYTES PENDING K/UL    ABS. EOSINOPHILS PENDING K/UL    ABS. BASOPHILS PENDING K/UL    ABS. IMM. GRANS. PENDING K/UL    DF PENDING    METABOLIC PANEL, COMPREHENSIVE    Collection Time: 06/27/22 10:22 PM   Result Value Ref Range    Sodium 130 (L) 136 - 145 mmol/L    Potassium 3.4 (L) 3.5 - 5.1 mmol/L    Chloride 93 (L) 97 - 108 mmol/L    CO2 27 21 - 32 mmol/L    Anion gap 10 5 - 15 mmol/L    Glucose 118 (H) 65 - 100 mg/dL    BUN 14 6 - 20 mg/dL    Creatinine 0.90 0.70 - 1.30 mg/dL    BUN/Creatinine ratio 16 12 - 20      GFR est AA >60 >60 ml/min/1.73m2    GFR est non-AA >60 >60 ml/min/1.73m2    Calcium 8.6 8.5 - 10.1 mg/dL    Bilirubin, total 0.7 0.2 - 1.0 mg/dL    AST (SGOT) 53 (H) 15 - 37 U/L    ALT (SGPT) 39 12 - 78 U/L    Alk. phosphatase 77 45 - 117 U/L    Protein, total 5.7 (L) 6.4 - 8.2 g/dL    Albumin 3.0 (L) 3.5 - 5.0 g/dL    Globulin 2.7 2.0 - 4.0 g/dL    A-G Ratio 1.1 1.1 - 2.2         Radiologic Studies :     CXR Results  (Last 48 hours)               06/27/22 1308  XR CHEST PA LAT Final result    Narrative:  Chest 2 views. Lungs clear; no interstitial or alveolar pole right edema. Cardiac and   mediastinal structures within normal limits. No pneumothorax or sizable pleural   effusion. Osteopenia. Moderate grade thoracic spondylosis. Assessment and Plan :     Syncope: Etiology seems to be combination of metabolic and electrolyte abnormalities. May be associated with alcohol. Severe hypokalemia: Etiology unclear, on ACE inhibitor. Need further evaluation, once stable we will check urine electrolytes. Macrocytosis with elevated MCV. Which is significant elevation from December. He will need further evaluation, we will consult hematology, check J88 folic acid levels. Benign essential hypertension: We will need an ACE inhibitor. We will hold due to hypotension at this time. We will start on a beta-blocker due to significant tachycardia when he came in.   Once he is stable we will resume the medication as condition dictates    Alcohol drinking behavior on a regular basis: Not sure about the impact of it. Patient seems to be constantly talking and no signs of withdrawal noted. Abnormal EKG with left anterior fascicular block, we will get a 2D echocardiogram and cardiology evaluation so he can outpatient follow-up    Admitted to medical telemetry, full CODE STATUS, home medications reviewed and obviously he is not taking them regularly. Has no advance medical directives. No anticoagulation for VTE prophylaxis due to risk of bleeding      CC : Dr. Pauly Weldon  Signed By: Josemanuel Holden MD     June 27, 2022      This dictation was done by dragon, computer voice recognition software. Often unanticipated grammatical, syntax, [employer] phones and other interpretive errors are inadvertently transcribed. Please excuse errors that have escaped final proofreading.

## 2022-06-28 NOTE — PROGRESS NOTES
Hospitalist Progress Note         Jacek Quinones MD          Daily Progress Note: 6/28/2022      Subjective: The patient is seen for follow  up. 60 y.o. male history of hypertension presents to the emergency room complaining of dizziness associated with lightheadedness and almost passing out. When he is trying to walk around he feels so dizzy falling, states he is trying to be close to the bed so that he can fall on the bed. He is found with potassium of 2.5. Was given potassium supplements. In December he presented almost similar, found with hypokalemia was treated and discharged. He has an EKG with left anterior fascicular block, and CBC macrocytosis with elevated MCV at 108. It was significantly increased from December. ----  Patient seen in follow-up.   Notes improved dizziness    Problem List:  Problem List as of 6/28/2022 Date Reviewed: 6/27/2022          Codes Class Noted - Resolved    Hypokalemia ICD-10-CM: E87.6  ICD-9-CM: 276.8  6/27/2022 - Present        Tachycardia ICD-10-CM: R00.0  ICD-9-CM: 785.0  6/27/2022 - Present        Hypotension ICD-10-CM: I95.9  ICD-9-CM: 458.9  6/27/2022 - Present        Colitis ICD-10-CM: K52.9  ICD-9-CM: 558.9  12/7/2021 - Present        Hypovolemic shock ([facility] [address] ICD-10-CM: R57.1  ICD-9-CM: 785.59  12/7/2021 - Present              Medications reviewed  Current Facility-Administered Medications   Medication Dose Route Frequency    phosphorus (K PHOS NEUTRAL) 250 mg tablet 2 Tablet  2 Tablet Oral QID    potassium phosphate 30 mmol in 0.9% sodium chloride 500 mL infusion   IntraVENous ONCE    potassium chloride (KLOR-CON) packet for solution 20 mEq  20 mEq Oral BID WITH MEALS    magnesium oxide (MAG-OX) tablet 400 mg  400 mg Oral BID    pantoprazole (PROTONIX) tablet 40 mg  40 mg Oral ACB    sodium chloride (NS) flush 5-40 mL  5-40 mL IntraVENous Q8H    sodium chloride (NS) flush 5-40 mL  5-40 mL IntraVENous PRN    acetaminophen (TYLENOL) tablet 650 mg  650 mg Oral Q6H PRN    Or    acetaminophen (TYLENOL) suppository 650 mg  650 mg Rectal Q6H PRN    ondansetron (ZOFRAN ODT) tablet 4 mg  4 mg Oral Q6H PRN    Or    ondansetron (ZOFRAN) injection 4 mg  4 mg IntraVENous Q6H PRN    lactated Ringers infusion  125 mL/hr IntraVENous CONTINUOUS    thiamine mononitrate (B-1) tablet 100 mg  100 mg Oral DAILY    folic acid (FOLVITE) tablet 1 mg  1 mg Oral DAILY    LORazepam (ATIVAN) injection 1 mg  1 mg IntraVENous Q4H PRN     Current Outpatient Medications   Medication Sig    lisinopriL (PRINIVIL, ZESTRIL) 40 mg tablet Take 40 mg by mouth daily.  omeprazole (PRILOSEC) 40 mg capsule Take 40 mg by mouth Daily (before breakfast). Review of Systems:   A comprehensive review of systems was negative except for that written in the HPI. Objective:   Physical Exam:     Visit Vitals  /71   Pulse (!) 111   Temp 98.5 °F (36.9 °C)   Resp 17   Ht 5' 7\" (1.702 m)   Wt 72.6 kg (160 lb)   SpO2 96%   BMI 25.06 kg/m²      O2 Device: None (Room air)    Temp (24hrs), Av.4 °F (36.9 °C), Min:98.3 °F (36.8 °C), Max:98.5 °F (36.9 °C)    No intake/output data recorded.  1901 -  0700  In: 100 [I.V.:100]  Out: -     General:  Alert, cooperative, no distress, appears stated age. Lungs:   Clear to auscultation bilaterally. Chest wall:  No tenderness or deformity. Heart:  Regular rate and rhythm, S1, S2 normal, no murmur, click, rub or gallop. Abdomen:   Soft, non-tender. Bowel sounds normal. No masses,  No organomegaly. Extremities: Extremities normal, atraumatic, no cyanosis or edema. Pulses: 2+ and symmetric all extremities. Skin: Skin color, texture, turgor normal. No rashes or lesions   Neurologic: CNII-XII intact.  No gross sensory or motor deficits     Data Review:       Recent Days:  Recent Labs     22  2222 22  1250   WBC 7.3 8.6   HGB 11.3* 13.2   HCT 30.2* 36.2*    366     Recent Labs 06/28/22  0500 06/27/22  2222 06/27/22  1602 06/27/22  1250 06/27/22  1250   * 130*  --   --  131*   K 3.0* 3.4* 2.7*   < > 2.8*   CL 96* 93*  --   --  87*   CO2 27 27  --   --  28    118*  --   --  130*   BUN 12 14  --   --  14   CREA 0.72 0.90  --   --  1.16   CA 8.6 8.6  --   --  9.8   MG  --   --   --   --  1.5*   PHOS 0.5*  --   --   --   --    ALB 3.0* 3.0*  --   --  3.8   TBILI  --  0.7  --   --  1.1*   ALT  --  39  --   --  53    < > = values in this interval not displayed. No results for input(s): PH, PCO2, PO2, HCO3, FIO2 in the last 72 hours. 24 Hour Results:  Recent Results (from the past 24 hour(s))   CBC WITH AUTOMATED DIFF    Collection Time: 06/27/22 12:50 PM   Result Value Ref Range    WBC 8.6 4.1 - 11.1 K/uL    RBC 3.35 (L) 4.10 - 5.70 M/uL    HGB 13.2 12.1 - 17.0 g/dL    HCT 36.2 (L) 36.6 - 50.3 %    .1 (H) 80.0 - 99.0 FL    MCH 39.4 (H) 26.0 - 34.0 PG    MCHC 36.5 30.0 - 36.5 g/dL    RDW 14.0 11.5 - 14.5 %    PLATELET 196 023 - 135 K/uL    MPV 9.7 8.9 - 12.9 FL    NEUTROPHILS 68 32 - 75 %    LYMPHOCYTES 20 12 - 49 %    MONOCYTES 10 5 - 13 %    EOSINOPHILS 0 0 - 7 %    BASOPHILS 0 0 - 1 %    IMMATURE GRANULOCYTES 2 (H) 0.0 - 0.5 %    ABS. NEUTROPHILS 5.8 1.8 - 8.0 K/UL    ABS. LYMPHOCYTES 1.7 0.8 - 3.5 K/UL    ABS. MONOCYTES 0.9 0.0 - 1.0 K/UL    ABS. EOSINOPHILS 0.0 0.0 - 0.4 K/UL    ABS. BASOPHILS 0.0 0.0 - 0.1 K/UL    ABS. IMM.  GRANS. 0.2 (H) 0.00 - 0.04 K/UL    DF AUTOMATED     METABOLIC PANEL, COMPREHENSIVE    Collection Time: 06/27/22 12:50 PM   Result Value Ref Range    Sodium 131 (L) 136 - 145 mmol/L    Potassium 2.8 (L) 3.5 - 5.1 mmol/L    Chloride 87 (L) 97 - 108 mmol/L    CO2 28 21 - 32 mmol/L    Anion gap 16 (H) 5 - 15 mmol/L    Glucose 130 (H) 65 - 100 mg/dL    BUN 14 6 - 20 mg/dL    Creatinine 1.16 0.70 - 1.30 mg/dL    BUN/Creatinine ratio 12 12 - 20      GFR est AA >60 >60 ml/min/1.73m2    GFR est non-AA >60 >60 ml/min/1.73m2    Calcium 9.8 8.5 - 10.1 mg/dL    Bilirubin, total 1.1 (H) 0.2 - 1.0 mg/dL    AST (SGOT) 68 (H) 15 - 37 U/L    ALT (SGPT) 53 12 - 78 U/L    Alk. phosphatase 93 45 - 117 U/L    Protein, total 6.7 6.4 - 8.2 g/dL    Albumin 3.8 3.5 - 5.0 g/dL    Globulin 2.9 2.0 - 4.0 g/dL    A-G Ratio 1.3 1.1 - 2.2     TROPONIN-HIGH SENSITIVITY    Collection Time: 06/27/22 12:50 PM   Result Value Ref Range    Troponin-High Sensitivity 12 0 - 76 ng/L   D DIMER    Collection Time: 06/27/22 12:50 PM   Result Value Ref Range    D-dimer 0.44 0.19 - 0.50 mg/L FEU   PTT    Collection Time: 06/27/22 12:50 PM   Result Value Ref Range    aPTT 20.8 (L) 22.1 - 31.0 sec    aPTT, therapeutic range   82 - 109 sec   ETHYL ALCOHOL    Collection Time: 06/27/22 12:50 PM   Result Value Ref Range    ALCOHOL(ETHYL),SERUM <10 <10 mg/dL   MAGNESIUM    Collection Time: 06/27/22 12:50 PM   Result Value Ref Range    Magnesium 1.5 (L) 1.6 - 2.4 mg/dL   POTASSIUM    Collection Time: 06/27/22  4:02 PM   Result Value Ref Range    Potassium 2.7 (LL) 3.5 - 5.1 mmol/L   CBC WITH AUTOMATED DIFF    Collection Time: 06/27/22 10:22 PM   Result Value Ref Range    WBC 7.3 4.1 - 11.1 K/uL    RBC 2.82 (L) 4.10 - 5.70 M/uL    HGB 11.3 (L) 12.1 - 17.0 g/dL    HCT 30.2 (L) 36.6 - 50.3 %    .1 (H) 80.0 - 99.0 FL    MCH 40.1 (H) 26.0 - 34.0 PG    MCHC 37.4 (H) 30.0 - 36.5 g/dL    RDW 13.9 11.5 - 14.5 %    PLATELET 302 887 - 657 K/uL    MPV 9.6 8.9 - 12.9 FL    NRBC 0.4 (H) 0.0  WBC    ABSOLUTE NRBC 0.03 (H) 0.00 - 0.01 K/uL    NEUTROPHILS 50 32 - 75 %    LYMPHOCYTES 39 12 - 49 %    MONOCYTES 9 5 - 13 %    EOSINOPHILS 0 0 - 7 %    BASOPHILS 0 0 - 1 %    MYELOCYTES 2 (H) 0 %    IMMATURE GRANULOCYTES 0 %    ABS. NEUTROPHILS 3.7 1.8 - 8.0 K/UL    ABS. LYMPHOCYTES 2.8 0.8 - 3.5 K/UL    ABS. MONOCYTES 0.7 0.0 - 1.0 K/UL    ABS. EOSINOPHILS 0.0 0.0 - 0.4 K/UL    ABS. BASOPHILS 0.0 0.0 - 0.1 K/UL    ABS. IMM.  GRANS. 0.0 K/UL    DF Smear Scanned      RBC COMMENTS Macrocytosis  1+       METABOLIC PANEL, COMPREHENSIVE    Collection Time: 06/27/22 10:22 PM   Result Value Ref Range    Sodium 130 (L) 136 - 145 mmol/L    Potassium 3.4 (L) 3.5 - 5.1 mmol/L    Chloride 93 (L) 97 - 108 mmol/L    CO2 27 21 - 32 mmol/L    Anion gap 10 5 - 15 mmol/L    Glucose 118 (H) 65 - 100 mg/dL    BUN 14 6 - 20 mg/dL    Creatinine 0.90 0.70 - 1.30 mg/dL    BUN/Creatinine ratio 16 12 - 20      GFR est AA >60 >60 ml/min/1.73m2    GFR est non-AA >60 >60 ml/min/1.73m2    Calcium 8.6 8.5 - 10.1 mg/dL    Bilirubin, total 0.7 0.2 - 1.0 mg/dL    AST (SGOT) 53 (H) 15 - 37 U/L    ALT (SGPT) 39 12 - 78 U/L    Alk. phosphatase 77 45 - 117 U/L    Protein, total 5.7 (L) 6.4 - 8.2 g/dL    Albumin 3.0 (L) 3.5 - 5.0 g/dL    Globulin 2.7 2.0 - 4.0 g/dL    A-G Ratio 1.1 1.1 - 2.2     RENAL FUNCTION PANEL    Collection Time: 06/28/22  5:00 AM   Result Value Ref Range    Sodium 133 (L) 136 - 145 mmol/L    Potassium 3.0 (L) 3.5 - 5.1 mmol/L    Chloride 96 (L) 97 - 108 mmol/L    CO2 27 21 - 32 mmol/L    Anion gap 10 5 - 15 mmol/L    Glucose 100 65 - 100 mg/dL    BUN 12 6 - 20 mg/dL    Creatinine 0.72 0.70 - 1.30 mg/dL    BUN/Creatinine ratio 17 12 - 20      GFR est AA >60 >60 ml/min/1.73m2    GFR est non-AA >60 >60 ml/min/1.73m2    Calcium 8.6 8.5 - 10.1 mg/dL    Phosphorus 0.5 (LL) 2.6 - 4.7 mg/dL    Albumin 3.0 (L) 3.5 - 5.0 g/dL   DRUG SCREEN, URINE    Collection Time: 06/28/22  6:30 AM   Result Value Ref Range    AMPHETAMINES Negative Negative      BARBITURATES Negative Negative      BENZODIAZEPINES Negative Negative      COCAINE Negative Negative      METHADONE Negative Negative      OPIATES Negative Negative      PCP(PHENCYCLIDINE) Negative Negative      THC (TH-CANNABINOL) Negative Negative      Drug screen comment        This test is a screen for drugs of abuse in a medical setting only (i.e., they are unconfirmed results and as such must not be used for non-medical purposes, e.g.,employment testing, legal testing).  Due to its inherent nature, false positive (FP) and false negative (FN) results may be obtained. Therefore, if necessary for medical care, recommend confirmation of positive findings by GC/MS. XR CHEST PA LAT   Final Result            Assessment/     Syncope most likely vasovagal  Hypokalemia  Hypomagnesemia  Hypophosphatemia  Chronic alcohol binging behavior  Abnormal EKG with left anterior fascicular block      Plan:  Continue supportive care including gentle hydration with IV fluids  Replete electrolytes  Obtain PT OT eval prior to discharge  Anticipate discharge to home tomorrow    Care Plan discussed with: Patient/Family    Total time spent with patient: 30 minutes.     Donnie Estevez MD

## 2022-06-28 NOTE — CONSULTS
Consult    Patient: Jennifer Figueroa MRN: [id number]  SSN: [id number]    YOB: 1965  Age: 60 y.o. Sex: male      Subjective:      Jennifer Figueroa is a 60 y.o. male who is being seen for chest pain. Patient with history of hypertension, GERD. He has a history of alcohol abuse with drinks at least 4 drinks every day. Presented to the emergency room complaining of dizziness, lightheadedness and near syncope. The first episode happened on Friday when he was trying to go to the bathroom and he stood up and felt woozy and felt forward hitting his chest against out of bed then he fell backward hitting his head. Never passed out. Then he has been having some chest pain mainly positional.  No nausea or vomiting or excessive sweating. Denies recent change in his weight or lower extremity swelling. Past Medical History:   Diagnosis Date    Gastrointestinal disorder     Hypertension      History reviewed. No pertinent surgical history. History reviewed. No pertinent family history.   Social History     Tobacco Use    Smoking status: Current Every Day Smoker     Packs/day: 0.50    Smokeless tobacco: Never Used   Substance Use Topics    Alcohol use: Yes     Comment: daily      Current Facility-Administered Medications   Medication Dose Route Frequency Provider Last Rate Last Admin    phosphorus (K PHOS NEUTRAL) 250 mg tablet 2 Tablet  2 Tablet Oral QID Erin Brody MD   2 Tablet at 06/28/22 1131    potassium phosphate 30 mmol in 0.9% sodium chloride 500 mL infusion   IntraVENous ONCE Erin Brody .5 mL/hr at 06/28/22 1131 New Bag at 06/28/22 1131    potassium chloride (KLOR-CON) packet for solution 20 mEq  20 mEq Oral BID WITH MEALS Erin Brody MD        magnesium oxide (MAG-OX) tablet 400 mg  400 mg Oral BID Erin Brody MD        pantoprazole (PROTONIX) tablet 40 mg  40 mg Oral ACB Chyna Moulton MD   40 mg at 06/28/22 9276    sodium chloride (NS) flush 5-40 mL  5-40 mL IntraVENous Q8H Jefe Swain MD   10 mL at 06/28/22 0043    sodium chloride (NS) flush 5-40 mL  5-40 mL IntraVENous PRN Jefe Swain MD        acetaminophen (TYLENOL) tablet 650 mg  650 mg Oral Q6H PRN Jefe Swain MD        Or   [facility] acetaminophen (TYLENOL) suppository 650 mg  650 mg Rectal Q6H PRN Jefe Swain MD        ondansetron (ZOFRAN ODT) tablet 4 mg  4 mg Oral Q6H PRN Jefe Swain MD        Or    ondansetron [facility]) injection 4 mg  4 mg IntraVENous Q6H PRN Jefe Swain MD        lactated Ringers infusion  125 mL/hr IntraVENous CONTINUOUS Jefe Swain  mL/hr at 06/28/22 0503 125 mL/hr at 06/28/22 0503    thiamine mononitrate (B-1) tablet 100 mg  100 mg Oral DAILY Jefe Swain MD   100 mg at 81/30/39 5696    folic acid (FOLVITE) tablet 1 mg  1 mg Oral DAILY Jefe Swain MD   1 mg at 06/28/22 8493    LORazepam (ATIVAN) injection 1 mg  1 mg IntraVENous Q4H PRN Jefe Swain MD         Current Outpatient Medications   Medication Sig Dispense Refill    lisinopriL (PRINIVIL, ZESTRIL) 40 mg tablet Take 40 mg by mouth daily.  omeprazole (PRILOSEC) 40 mg capsule Take 40 mg by mouth Daily (before breakfast). No Known Allergies    Review of Systems:  A comprehensive review of systems was negative except for that written in the History of Present Illness. Objective:     Vitals:    06/28/22 0522 06/28/22 0900 06/28/22 0945 06/28/22 1000   BP: (!) 134/102 126/78 126/78 115/71   Pulse: 98 (!) 111     Resp: 18 17     Temp:       SpO2: 99% 96% 97% 96%   Weight:       Height:            Physical Exam:  General:  Alert, cooperative, no distress, appears stated age. Eyes:  Conjunctivae/corneas clear. PERRL, EOMs intact. Fundi benign   Ears:  Normal TMs and external ear canals both ears. Nose: Nares normal. Septum midline. Mucosa normal. No drainage or sinus tenderness. Mouth/Throat: Lips, mucosa, and tongue normal. Teeth and gums normal.   Neck: Supple, symmetrical, trachea midline, no adenopathy, thyroid: no enlargment/tenderness/nodules, no carotid bruit and no JVD. Back:   Symmetric, no curvature. ROM normal. No CVA tenderness. Lungs:   Clear to auscultation bilaterally. Heart:  Regular rate and rhythm, S1, S2 normal, no murmur, click, rub or gallop. Abdomen:   Soft, non-tender. Bowel sounds normal. No masses,  No organomegaly. Extremities: Extremities normal, atraumatic, no cyanosis or edema. Pulses: 2+ and symmetric all extremities. Skin: Skin color, texture, turgor normal. No rashes or lesions   Lymph nodes: Cervical, supraclavicular, and axillary nodes normal.   Neurologic: CNII-XII intact. Normal strength, sensation and reflexes throughout. Assessment:     Hospital Problems  Date Reviewed: 6/27/2022          Codes Class Noted POA    Hypokalemia ICD-10-CM: E87.6  ICD-9-CM: 276.8  6/27/2022 Yes        Tachycardia ICD-10-CM: R00.0  ICD-9-CM: 785.0  6/27/2022 Yes        Hypotension ICD-10-CM: I95.9  ICD-9-CM: 458.9  6/27/2022 Unknown            Patient is a 57-year-old white male with:  1. Chest pain, musculoskeletal  2. Hypertension  3. Left anterior physical block  4. Near syncope  5. Alcohol abuse  6. Severe hypokalemia  7. Moderate grade thoracic spondylosis  8. Osteopenia  Plan:     EKG showed normal sinus rhythm, nonspecific ST-T wave changes. Appears to be euvolemic. Hemoglobin 11.3, platelet 396. . Potassium was 2.7 and improved to 3.0. Continue to replete potassium. Troponin was negative. LFT are normal.  Urine drug screen is negative. Chest x-ray showed osteopenia and moderate grade thoracic spondylosis  Anyhow chest x-ray was nonacute. Check echocardiogram.  Blood pressure is normal.  Hold off lisinopril  Check orthostatic  Recheck BMP in a.m.   Patient is counseled regarding importance of alcohol abstinence  Further recommendation depending on clinical progression and echo finding    Thank you  For involving Mr. Alondra Burton [facility]    Signed By: Sandra Davis MD     June 28, 2022

## 2022-06-28 NOTE — CONSULTS
Hematology/Oncology Consult    Patient: Vance lE MRN: [id number]     YOB: 1965  Age: 60 y.o. Sex: male      HPI      Chief Complaint   Patient presents with   Vivek Bas is a 60 y.o. male who is being seen for macrocytosis. Is a 25-year-old gentleman with history of hypertension, GERD. He has a history of alcohol abuse with drinks at least 4 drinks every day, he states he drinks liquor. Presented to the emergency room complaining of dizziness, lightheadedness and near syncope. He had history of falls. Patient is noted to have macrocytosis with mild anemia on labs so hematology consult is requested. Patient reports feeling better. U tox here and alcohol levels were negative. He is now complaining of pain in his left ribs due to the fall. He is upset that the nurse is taking a long time to bring his pain medicine. He denied any bleeding or bruising issues. Past Medical History:   Diagnosis Date    Gastrointestinal disorder     Hypertension      History reviewed. No pertinent surgical history. History reviewed. No pertinent family history.   Social History     Tobacco Use    Smoking status: Current Every Day Smoker     Packs/day: 0.50    Smokeless tobacco: Never Used   Substance Use Topics    Alcohol use: Yes     Comment: daily      Current Facility-Administered Medications   Medication Dose Route Frequency Provider Last Rate Last Admin    phosphorus (K PHOS NEUTRAL) 250 mg tablet 2 Tablet  2 Tablet Oral QID Sharlee Canavan, MD   2 Tablet at 06/28/22 1436    potassium chloride (KLOR-CON) packet for solution 20 mEq  20 mEq Oral BID WITH MEALS Sharlee Canavan, MD   20 mEq at 06/28/22 1439    magnesium oxide (MAG-OX) tablet 400 mg  400 mg Oral BID Sharlee Canavan, MD   400 mg at 06/28/22 1550    alum-mag hydroxide-simeth (MYLANTA) oral suspension 30 mL  30 mL Oral Q4H PRN Sharlee Canavan, MD   30 mL at 06/28/22 4053    pantoprazole (PROTONIX) tablet 40 mg  40 mg Oral ACB Ellen Deluna MD   40 mg at 06/28/22 4788    sodium chloride (NS) flush 5-40 mL  5-40 mL IntraVENous Q8H Ellen Deluna MD   10 mL at 06/28/22 0043    sodium chloride (NS) flush 5-40 mL  5-40 mL IntraVENous PRN Ellen Deluna MD        acetaminophen (TYLENOL) tablet 650 mg  650 mg Oral Q6H PRN Ellen Deluna MD        Or   [facility] acetaminophen (TYLENOL) suppository 650 mg  650 mg Rectal Q6H PRN Ellen Deluna MD        ondansetron (ZOFRAN ODT) tablet 4 mg  4 mg Oral Q6H PRN Ellen Deluna MD        Or    ondansetron [facility]) injection 4 mg  4 mg IntraVENous Q6H PRN Ellen Deluna MD   4 mg at 06/28/22 1506    thiamine mononitrate (B-1) tablet 100 mg  100 mg Oral DAILY Ellen Deluna MD   100 mg at 45/47/86 5004    folic acid (FOLVITE) tablet 1 mg  1 mg Oral DAILY Ellen Deluna MD   1 mg at 06/28/22 2295    LORazepam (ATIVAN) injection 1 mg  1 mg IntraVENous Q4H PRN Ellen Deluna MD         Current Outpatient Medications   Medication Sig Dispense Refill    lisinopriL (PRINIVIL, ZESTRIL) 40 mg tablet Take 40 mg by mouth daily.  omeprazole (PRILOSEC) 40 mg capsule Take 40 mg by mouth Daily (before breakfast). No Known Allergies    Review of Systems:  Constitutional No fevers, chills, night sweats, excessive fatigue or weight loss. Allergic/Immunologic No recent allergic reactions   Eyes No significant visual difficulties. No diplopia. ENMT No problems with hearing, no sore throat, no sinus drainage. Endocrine No hot flashes or night sweats. No cold intolerance, polyuria, or polydipsia   Hematologic/Lymphatic No easy bruising or bleeding. The patient denies any tender or palpable lymph nodes   Breasts No abnormal masses of breast, nipple discharge or pain. Respiratory No dyspnea on exertion, orthopnea, chest pain, cough or hemoptysis.    Cardiovascular No anginal chest pain, irregular heart beat, tachycardia, palpitations or orthopnea. Gastrointestinal No nausea, vomiting, diarrhea, constipation, cramping, dysphagia, reflux, heartburn, GI bleeding, or early satiety. No change in bowel habits. Genitourinary (M) No hematuria, dysuria, increased frequency, urgency, hesitancy or incontinence. Musculoskeletal  complains of left-sided rib pain   Integumentary No chronic rashes, inflammation, ulcerations, pruritus, petechiae, purpura, ecchymoses, or skin changes. Neurologic  reports his equilibrium has improved   Psychiatric No insomnia, depression, ceasar or mood swings. No psychotropic drugs. Objective:     Vitals:    06/28/22 0945 06/28/22 1000 06/28/22 1300 06/28/22 1400   BP: 126/78 115/71 (!) 133/93 (!) 146/95   Pulse:       Resp:       Temp:       SpO2: 97% 96% 99% 97%   Weight:       Height:            Physical Exam:  Constitutional  white male alert, cooperative, oriented. Mood and affect appropriate. Appears close to chronological age. Well nourished. Well developed. Head Normocephalic; no scars   Eyes Conjunctivae and sclerae are clear and without icterus. Pupils are reactive and equal.   ENMT Sinuses are nontender. No oral exudates, ulcers, masses, thrush or mucositis. Oropharynx clear. Tongue normal.   Neck Supple without masses or thyromegaly. No jugular venous distension. Hematologic/Lymphatic No petechiae or purpura. No tender or palpable lymph nodes in the cervical, supraclavicular, axillary or inguinal area. Respiratory Lungs are clear to auscultation without rhonchi or wheezing. Cardiovascular Regular rate and rhythm of heart without murmurs, gallops or rubs. Chest / Line Site Chest is symmetric with no chest wall deformities. Abdomen Non-tender, non-distended, no masses, ascites or hepatosplenomegaly. Good bowel sounds. No guarding or rebound tenderness. No pulsatile masses.    Musculoskeletal No tenderness or swelling, normal range of motion without obvious weakness. Extremities No visible deformities, no cyanosis, clubbing or edema. Skin No rashes, scars, or lesions suggestive of malignancy. No petechiae, purpura, or ecchymoses. No excoriations. Neurologic No sensory or motor deficits, normal cerebellar function, normal gait, cranial nerves intact. Psychiatric Alert and oriented times three. Coherent speech. Verbalizes understanding of our discussions today. Lab/Data Review:  Recent Labs     06/27/22  2222 06/27/22  1250   WBC 7.3 8.6   HGB 11.3* 13.2   HCT 30.2* 36.2*    366     Recent Labs     06/28/22  0500 06/27/22  2222 06/27/22  1602 06/27/22  1250 06/27/22  1250   * 130*  --   --  131*   K 3.0* 3.4* 2.7*   < > 2.8*   CL 96* 93*  --   --  87*   CO2 27 27  --   --  28    118*  --   --  130*   BUN 12 14  --   --  14   CREA 0.72 0.90  --   --  1.16   CA 8.6 8.6  --   --  9.8   MG  --   --   --   --  1.5*   PHOS 0.5*  --   --   --   --    ALB 3.0* 3.0*  --   --  3.8   TBILI  --  0.7  --   --  1.1*   ALT  --  39  --   --  53    < > = values in this interval not displayed. No results for input(s): PH, PCO2, PO2, HCO3, FIO2 in the last 72 hours. Recent Results (from the past 24 hour(s))   CBC WITH AUTOMATED DIFF    Collection Time: 06/27/22 10:22 PM   Result Value Ref Range    WBC 7.3 4.1 - 11.1 K/uL    RBC 2.82 (L) 4.10 - 5.70 M/uL    HGB 11.3 (L) 12.1 - 17.0 g/dL    HCT 30.2 (L) 36.6 - 50.3 %    .1 (H) 80.0 - 99.0 FL    MCH 40.1 (H) 26.0 - 34.0 PG    MCHC 37.4 (H) 30.0 - 36.5 g/dL    RDW 13.9 11.5 - 14.5 %    PLATELET 817 479 - 987 K/uL    MPV 9.6 8.9 - 12.9 FL    NRBC 0.4 (H) 0.0  WBC    ABSOLUTE NRBC 0.03 (H) 0.00 - 0.01 K/uL    NEUTROPHILS 50 32 - 75 %    LYMPHOCYTES 39 12 - 49 %    MONOCYTES 9 5 - 13 %    EOSINOPHILS 0 0 - 7 %    BASOPHILS 0 0 - 1 %    MYELOCYTES 2 (H) 0 %    IMMATURE GRANULOCYTES 0 %    ABS. NEUTROPHILS 3.7 1.8 - 8.0 K/UL    ABS.  LYMPHOCYTES 2.8 0.8 - 3.5 K/UL    ABS. MONOCYTES 0.7 0.0 - 1.0 K/UL    ABS. EOSINOPHILS 0.0 0.0 - 0.4 K/UL    ABS. BASOPHILS 0.0 0.0 - 0.1 K/UL    ABS. IMM. GRANS. 0.0 K/UL    DF Smear Scanned      RBC COMMENTS Macrocytosis  1+       METABOLIC PANEL, COMPREHENSIVE    Collection Time: 06/27/22 10:22 PM   Result Value Ref Range    Sodium 130 (L) 136 - 145 mmol/L    Potassium 3.4 (L) 3.5 - 5.1 mmol/L    Chloride 93 (L) 97 - 108 mmol/L    CO2 27 21 - 32 mmol/L    Anion gap 10 5 - 15 mmol/L    Glucose 118 (H) 65 - 100 mg/dL    BUN 14 6 - 20 mg/dL    Creatinine 0.90 0.70 - 1.30 mg/dL    BUN/Creatinine ratio 16 12 - 20      GFR est AA >60 >60 ml/min/1.73m2    GFR est non-AA >60 >60 ml/min/1.73m2    Calcium 8.6 8.5 - 10.1 mg/dL    Bilirubin, total 0.7 0.2 - 1.0 mg/dL    AST (SGOT) 53 (H) 15 - 37 U/L    ALT (SGPT) 39 12 - 78 U/L    Alk.  phosphatase 77 45 - 117 U/L    Protein, total 5.7 (L) 6.4 - 8.2 g/dL    Albumin 3.0 (L) 3.5 - 5.0 g/dL    Globulin 2.7 2.0 - 4.0 g/dL    A-G Ratio 1.1 1.1 - 2.2     RENAL FUNCTION PANEL    Collection Time: 06/28/22  5:00 AM   Result Value Ref Range    Sodium 133 (L) 136 - 145 mmol/L    Potassium 3.0 (L) 3.5 - 5.1 mmol/L    Chloride 96 (L) 97 - 108 mmol/L    CO2 27 21 - 32 mmol/L    Anion gap 10 5 - 15 mmol/L    Glucose 100 65 - 100 mg/dL    BUN 12 6 - 20 mg/dL    Creatinine 0.72 0.70 - 1.30 mg/dL    BUN/Creatinine ratio 17 12 - 20      GFR est AA >60 >60 ml/min/1.73m2    GFR est non-AA >60 >60 ml/min/1.73m2    Calcium 8.6 8.5 - 10.1 mg/dL    Phosphorus 0.5 (LL) 2.6 - 4.7 mg/dL    Albumin 3.0 (L) 3.5 - 5.0 g/dL   VITAMIN B12 & FOLATE    Collection Time: 06/28/22  5:00 AM   Result Value Ref Range    Vitamin B12 264 193 - 986 pg/mL    Folate 2.5 (L) 5.0 - 21.0 ng/mL   DRUG SCREEN, URINE    Collection Time: 06/28/22  6:30 AM   Result Value Ref Range    AMPHETAMINES Negative Negative      BARBITURATES Negative Negative      BENZODIAZEPINES Negative Negative      COCAINE Negative Negative      METHADONE Negative Negative OPIATES Negative Negative      PCP(PHENCYCLIDINE) Negative Negative      THC (TH-CANNABINOL) Negative Negative      Drug screen comment        This test is a screen for drugs of abuse in a medical setting only (i.e., they are unconfirmed results and as such must not be used for non-medical purposes, e.g.,employment testing, legal testing). Due to its inherent nature, false positive (FP) and false negative (FN) results may be obtained. Therefore, if necessary for medical care, recommend confirmation of positive findings by GC/MS. No results found. Assessment and Plan:     Hospital Problems  Date Reviewed: 6/27/2022          Codes Class Noted POA    Hypokalemia ICD-10-CM: E87.6  ICD-9-CM: 276.8  6/27/2022 Yes        Tachycardia ICD-10-CM: R00.0  ICD-9-CM: 785.0  6/27/2022 Yes        Hypotension ICD-10-CM: I95.9  ICD-9-CM: 458.9  6/27/2022 Unknown              3  64-year-old male admitted to the hospital with complaints of dizziness lightheadedness and near syncope. He had electrolyte abnormalities with severe hypokalemia felt to be due to alcohol use.  -Evaluated by cardiology. Naveed Torres 2) significant macrocytosis with mild anemia: Rest of the CBC is normal including a normal white count and platelets. -Marked macrocytosis due to alcohol abuse, , V93 and folic acid deficiencies.  -He is being repleted with Z24 and folic supplements. -recommended alcohol cessations. Will check labs for hemolysis, TFT and usg of liver to evaluate liver disorder which are other causes for macrocytosis.      Thank you for the consult     Signed By: Jim Garcia MD     June 28, 2022

## 2022-06-29 ENCOUNTER — APPOINTMENT (OUTPATIENT)
Dept: ULTRASOUND IMAGING | Age: 57
DRG: 425 | End: 2022-06-29
Attending: INTERNAL MEDICINE
Payer: MEDICAID

## 2022-06-29 ENCOUNTER — APPOINTMENT (OUTPATIENT)
Dept: NON INVASIVE DIAGNOSTICS | Age: 57
DRG: 425 | End: 2022-06-29
Attending: NURSE PRACTITIONER
Payer: MEDICAID

## 2022-06-29 PROBLEM — R55 NEAR SYNCOPE: Status: ACTIVE | Noted: 2022-06-29

## 2022-06-29 LAB
ANION GAP SERPL CALC-SCNC: 10 MMOL/L (ref 5–15)
ATRIAL RATE: 125 BPM
ATRIAL RATE: 99 BPM
BUN SERPL-MCNC: 8 MG/DL (ref 6–20)
BUN/CREAT SERPL: 10 (ref 12–20)
CA-I BLD-MCNC: 8 MG/DL (ref 8.5–10.1)
CALCULATED P AXIS, ECG09: 39 DEGREES
CALCULATED P AXIS, ECG09: 47 DEGREES
CALCULATED R AXIS, ECG10: -10 DEGREES
CALCULATED R AXIS, ECG10: -49 DEGREES
CALCULATED T AXIS, ECG11: 29 DEGREES
CALCULATED T AXIS, ECG11: 8 DEGREES
CHLORIDE SERPL-SCNC: 100 MMOL/L (ref 97–108)
CO2 SERPL-SCNC: 28 MMOL/L (ref 21–32)
CREAT SERPL-MCNC: 0.82 MG/DL (ref 0.7–1.3)
DIAGNOSIS, 93000: NORMAL
DIAGNOSIS, 93000: NORMAL
ECHO AO ROOT DIAM: 3.3 CM
ECHO AO ROOT INDEX: 1.79 CM/M2
ECHO AV PEAK GRADIENT: 5 MMHG
ECHO AV PEAK VELOCITY: 1.2 M/S
ECHO AV VELOCITY RATIO: 0.83
ECHO EST RA PRESSURE: 3 MMHG
ECHO LA DIAMETER INDEX: 1.68 CM/M2
ECHO LA DIAMETER: 3.1 CM
ECHO LA TO AORTIC ROOT RATIO: 0.94
ECHO LV E' LATERAL VELOCITY: 9 CM/S
ECHO LV E' SEPTAL VELOCITY: 7 CM/S
ECHO LV EJECTION FRACTION BIPLANE: 71 % (ref 55–100)
ECHO LV FRACTIONAL SHORTENING: 39 % (ref 28–44)
ECHO LV INTERNAL DIMENSION DIASTOLE INDEX: 1.79 CM/M2
ECHO LV INTERNAL DIMENSION DIASTOLIC: 3.3 CM (ref 4.2–5.9)
ECHO LV INTERNAL DIMENSION SYSTOLIC INDEX: 1.09 CM/M2
ECHO LV INTERNAL DIMENSION SYSTOLIC: 2 CM
ECHO LV IVSD: 1.1 CM (ref 0.6–1)
ECHO LV MASS 2D: 94.6 G (ref 88–224)
ECHO LV MASS INDEX 2D: 51.4 G/M2 (ref 49–115)
ECHO LV POSTERIOR WALL DIASTOLIC: 0.9 CM (ref 0.6–1)
ECHO LV RELATIVE WALL THICKNESS RATIO: 0.55
ECHO LVOT PEAK GRADIENT: 4 MMHG
ECHO LVOT PEAK VELOCITY: 1 M/S
ECHO MV A VELOCITY: 0.75 M/S
ECHO MV E DECELERATION TIME (DT): 120 MS
ECHO MV E VELOCITY: 0.75 M/S
ECHO MV E/A RATIO: 1
ECHO MV E/E' LATERAL: 8.33
ECHO MV E/E' RATIO (AVERAGED): 9.52
ECHO MV E/E' SEPTAL: 10.71
ECHO PV MAX VELOCITY: 1 M/S
ECHO PV PEAK GRADIENT: 4 MMHG
ECHO RIGHT VENTRICULAR SYSTOLIC PRESSURE (RVSP): 14 MMHG
ECHO RV INTERNAL DIMENSION: 2.7 CM
ECHO TV REGURGITANT MAX VELOCITY: 1.64 M/S
ECHO TV REGURGITANT PEAK GRADIENT: 11 MMHG
GLUCOSE SERPL-MCNC: 121 MG/DL (ref 65–100)
HAPTOGLOB SERPL-MCNC: 151 MG/DL (ref 30–200)
P-R INTERVAL, ECG05: 168 MS
P-R INTERVAL, ECG05: 170 MS
POTASSIUM SERPL-SCNC: 3.1 MMOL/L (ref 3.5–5.1)
Q-T INTERVAL, ECG07: 310 MS
Q-T INTERVAL, ECG07: 344 MS
QRS DURATION, ECG06: 72 MS
QRS DURATION, ECG06: 74 MS
QTC CALCULATION (BEZET), ECG08: 441 MS
QTC CALCULATION (BEZET), ECG08: 447 MS
SODIUM SERPL-SCNC: 138 MMOL/L (ref 136–145)
VENTRICULAR RATE, ECG03: 125 BPM
VENTRICULAR RATE, ECG03: 99 BPM

## 2022-06-29 PROCEDURE — 93306 TTE W/DOPPLER COMPLETE: CPT

## 2022-06-29 PROCEDURE — 74011250636 HC RX REV CODE- 250/636: Performed by: INTERNAL MEDICINE

## 2022-06-29 PROCEDURE — 74011250637 HC RX REV CODE- 250/637: Performed by: HOSPITALIST

## 2022-06-29 PROCEDURE — 74011250637 HC RX REV CODE- 250/637: Performed by: NURSE PRACTITIONER

## 2022-06-29 PROCEDURE — 74011250637 HC RX REV CODE- 250/637: Performed by: INTERNAL MEDICINE

## 2022-06-29 PROCEDURE — 76700 US EXAM ABDOM COMPLETE: CPT

## 2022-06-29 PROCEDURE — 74011000250 HC RX REV CODE- 250: Performed by: HOSPITALIST

## 2022-06-29 PROCEDURE — 65270000029 HC RM PRIVATE

## 2022-06-29 PROCEDURE — 36415 COLL VENOUS BLD VENIPUNCTURE: CPT

## 2022-06-29 PROCEDURE — 80048 BASIC METABOLIC PNL TOTAL CA: CPT

## 2022-06-29 RX ORDER — POTASSIUM CHLORIDE 1.5 G/1.77G
40 POWDER, FOR SOLUTION ORAL 2 TIMES DAILY WITH MEALS
Status: COMPLETED | OUTPATIENT
Start: 2022-06-29 | End: 2022-06-29

## 2022-06-29 RX ADMIN — SODIUM CHLORIDE, PRESERVATIVE FREE 10 ML: 5 INJECTION INTRAVENOUS at 06:06

## 2022-06-29 RX ADMIN — FOLIC ACID 1 MG: 1 TABLET ORAL at 09:47

## 2022-06-29 RX ADMIN — POTASSIUM CHLORIDE 40 MEQ: 1.5 POWDER, FOR SOLUTION ORAL at 18:22

## 2022-06-29 RX ADMIN — Medication 400 MG: at 21:10

## 2022-06-29 RX ADMIN — TRAMADOL HYDROCHLORIDE 50 MG: 50 TABLET, COATED ORAL at 15:07

## 2022-06-29 RX ADMIN — TRAMADOL HYDROCHLORIDE 50 MG: 50 TABLET, COATED ORAL at 21:10

## 2022-06-29 RX ADMIN — Medication 400 MG: at 09:47

## 2022-06-29 RX ADMIN — THIAMINE HCL TAB 100 MG 100 MG: 100 TAB at 09:47

## 2022-06-29 RX ADMIN — PANTOPRAZOLE SODIUM 40 MG: 40 TABLET, DELAYED RELEASE ORAL at 08:34

## 2022-06-29 RX ADMIN — CYANOCOBALAMIN 1000 MCG: 1000 INJECTION, SOLUTION INTRAMUSCULAR at 09:47

## 2022-06-29 RX ADMIN — TRAMADOL HYDROCHLORIDE 50 MG: 50 TABLET, COATED ORAL at 08:37

## 2022-06-29 NOTE — PROGRESS NOTES
Patient removed tele monitor before expiration time of 9184. Patient stated \" I was annoyed with it. \"

## 2022-06-29 NOTE — PROGRESS NOTES
8:00 am. Patient doesn't want the bed alarm on, says \"I will up and down all day and don't want it on. \" Refuses to wear the yellow safety socks, patient states [place] are too hot to wear. \"

## 2022-06-29 NOTE — PROGRESS NOTES
"2020       RE: Monica Morales  [address] [facility] [address]     Dear Colleague,    Thank you for referring your patient, Monica Moarles, to the [facility] at [facility]. Please see a copy of my visit note below.    Monica Morales is a 21 year old female who is being evaluated via a billable video visit.      The patient has been notified of following:     \"This video visit will be conducted via a call between you and your physician/provider. We have found that certain health care needs can be provided without the need for an in-person physical exam.  This service lets us provide the care you need with a video conversation.  If a prescription is necessary we can send it directly to your pharmacy.  If lab work is needed we can place an order for that and you can then stop by our lab to have the test done at a later time.    Video visits are billed at different rates depending on your insurance coverage.  Please reach out to your insurance provider with any questions.    If during the course of the call the physician/provider feels a video visit is not appropriate, you will not be charged for this service.\"    Patient has given verbal consent for Video visit? Yes  How would you like to obtain your AVS? MyChart  If you are dropped from the video visit, the video invite should be resent to: Send to e-mail at: [email or web address]  Will anyone else be joining your video visit? No        Video-Visit Details    Type of service:  Video Visit    Video Start Time: 1341  Video End Time: 1415    Originating Location (pt. Location): Home    Distant Location (provider location):  [facility]     Platform used for Video Visit: Kristi Soares NP        New Bariatric Surgery Consultation Note    2020    RE: Monica Morales  MR#: [id number]  : " Hematology Oncology Progress Note     NO interval changes or events. Subjective:     HE feels better. Getting pain med for the rib pain. receiivng oral K replacement. Currently on CIWA protocol. Feels balance is normal.     Current Facility-Administered Medications   Medication Dose Route Frequency    potassium chloride (KLOR-CON) packet for solution 40 mEq  40 mEq Oral BID WITH MEALS    magnesium oxide (MAG-OX) tablet 400 mg  400 mg Oral BID    alum-mag hydroxide-simeth (MYLANTA) oral suspension 30 mL  30 mL Oral Q4H PRN    cyanocobalamin (VITAMIN B12) injection 1,000 mcg  1,000 mcg SubCUTAneous DAILY    traMADoL (ULTRAM) tablet 50 mg  50 mg Oral Q6H PRN    pantoprazole (PROTONIX) tablet 40 mg  40 mg Oral ACB    sodium chloride (NS) flush 5-40 mL  5-40 mL IntraVENous Q8H    sodium chloride (NS) flush 5-40 mL  5-40 mL IntraVENous PRN    acetaminophen (TYLENOL) tablet 650 mg  650 mg Oral Q6H PRN    Or    acetaminophen (TYLENOL) suppository 650 mg  650 mg Rectal Q6H PRN    ondansetron (ZOFRAN ODT) tablet 4 mg  4 mg Oral Q6H PRN    Or    ondansetron (ZOFRAN) injection 4 mg  4 mg IntraVENous Q6H PRN    thiamine mononitrate (B-1) tablet 100 mg  100 mg Oral DAILY    folic acid (FOLVITE) tablet 1 mg  1 mg Oral DAILY    LORazepam (ATIVAN) injection 1 mg  1 mg IntraVENous Q4H PRN        Review of Systems:    Constitutional No fevers, chills, night sweats, excessive fatigue or weight loss. Allergic/Immunologic No recent allergic reactions   Eyes No significant visual difficulties. No diplopia. ENMT No problems with hearing, no sore throat, no sinus drainage. Endocrine No hot flashes or night sweats. No cold intolerance, polyuria, or polydipsia   Hematologic/Lymphatic No easy bruising or bleeding. The patient denies any tender or palpable lymph nodes   Breasts No abnormal masses of breast, nipple discharge or pain. Respiratory No dyspnea on exertion, orthopnea, chest pain, cough or hemoptysis. "1997      Referring provider:       12/16/2020   Who referred you? Doctor From [facility]       Chief Complaint/Reason for visit: evaluation for possible weight loss surgery    Dear Gini Schaefer MD (General),    I had the pleasure of seeing your patient, Monica Morales, to evaluate her obesity and consider her for possible weight loss surgery. As you know, Monica Morales is 21 year old.  She has a height of 5' 6\", a weight of 284 lbs 0 oz, and calculated Body mass index is 45.84 kg/m .      HISTORY OF PRESENT ILLNESS:  Weight Loss History Reviewed with Patient 12/16/2020   How long have you been overweight? Since early childhood   What is the most that you have ever weighed? 290   What is the most weight you have lost? 50   I have tried the following methods to lose weight Watching portions or calories, Exercise, Pre packaged meals ex: Nutrisystem, OTC Medications, Physician directed program   I have tried the following weight loss medications? (Check all that apply) None   Have you ever had weight loss surgery? No     Has considered weight loss surgery for a while but wanted to try on her own.   Most recent attempt- walking rather than driving places, watching what she was eating. Lost 10lbs but then started regaining.     CO-MORBIDITIES OF OBESITY INCLUDE:     12/16/2020   I have the following health issues associated with obesity: None of the above     A1C 8/2020 5.4  Sleep- snores, doesn't consistently wake rested, tosses and turns a lot. Some napping during the day. No excessive sleepiness   No reflux      Friends have had gastric bypass with good success     PAST MEDICAL HISTORY:  Past Medical History:   Diagnosis Date     Anxiety      Depressive disorder      No hx blood clots     PAST SURGICAL HISTORY:  No past surgical history on file.    FAMILY HISTORY:   Family History   Problem Relation Age of Onset     Asthma Father      Hypertension Father      Alcohol/Drug Father      " Cardiovascular No anginal chest pain, irregular heart beat, tachycardia, palpitations or orthopnea. Gastrointestinal No nausea, vomiting, diarrhea, constipation, cramping, dysphagia, reflux, heartburn, GI bleeding, or early satiety. No change in bowel habits. Genitourinary (M) No hematuria, dysuria, increased frequency, urgency, hesitancy or incontinence. Musculoskeletal No joint pain, swelling or redness. No decreased range of motion. Integumentary No chronic rashes, inflammation, ulcerations, pruritus, petechiae, purpura, ecchymoses, or skin changes. Neurologic No headache, blurred vision, and no areas of focal weakness or numbness. Normal gait. No sensory problems. Psychiatric No insomnia, depression, ceasar or mood swings. No psychotropic drugs. Objective:     Patient Vitals for the past 8 hrs:   BP Temp Pulse Resp SpO2   22 0908 112/79 97.7 °F (36.5 °C) (!) 104 19 98 %   22 0800 -- -- (!) 103 -- --       Temp (24hrs), Av.1 °F (36.7 °C), Min:97.7 °F (36.5 °C), Max:98.7 °F (37.1 °C)      Physical Exam:    Constitutional White male , Alert, cooperative, oriented. Mood and affect appropriate. Appears close to chronological age. Well nourished. Well developed. Head Normocephalic; no scars   Eyes Conjunctivae and sclerae are clear and without icterus. Pupils are reactive and equal.   ENMT Sinuses are nontender. No oral exudates, ulcers, masses, thrush or mucositis. Oropharynx clear. Tongue normal.   Neck Supple without masses or thyromegaly. No jugular venous distension. Hematologic/Lymphatic No petechiae or purpura. No tender or palpable lymph nodes in the cervical, supraclavicular, axillary or inguinal area. Respiratory Lungs are clear to auscultation without rhonchi or wheezing. Cardiovascular Regular rate and rhythm of heart without murmurs, gallops or rubs. Chest / Line Site Chest is symmetric with no chest wall deformities.    Abdomen Non-tender, non-distended, no Hypertension Paternal Grandmother      Depression Mother      Diabetes Mother        SOCIAL HISTORY:   Social History Questions Reviewed With Patient 12/16/2020   Which best describes your employment status (select all that apply) I am a student   Which best describes your marital status: single   Do you have children? No   Who do you have in your support network that can be available to help you for the first 2 weeks after surgery? [place]   Who can you count on for support throughout your weight loss surgery journey? friends family [employer]   Can you afford 3 meals a day?  Yes   Can you afford 50-60 dollars a month for vitamins? Yes       HABITS:     12/16/2020   How often do you drink alcohol? Never   Have you ever used any of the following nicotine products? E-cigarettes   If you previously used any of these products, what year did you quit? 2018   Have you or are you currently using street drugs or prescription strength medication for which you do not have a prescription for? No   Do you have a history of chemical dependency (alcohol or drug abuse)? 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 Never.   Have you had thoughts of suicide in the past year? No   Have you ever been hospitalized for mental illness or a suicide attempt? In the last 1 to 5 years.   Do you have a history of chronic pain? No   Have you ever been diagnosed with fibromyalgia? No   Are you currently being treated for any of the following? (select all that apply) Depression, Anxiety   Are you currently seeing a therapist or counselor?  Yes   Are you currently seeing a psychiatrist? Yes     6/6/2019, 3/2019  hospitalized for SI, Depression     ROS:     12/16/2020   Skin:  Skin fold rashes (groin or other folds)   HEENT: None of these   Musculoskeletal: Joint Pain, Swelling of legs   Cardiovascular: Shortness of  masses, ascites or hepatosplenomegaly. Good bowel sounds. No guarding or rebound tenderness. No pulsatile masses. Musculoskeletal No tenderness or swelling, normal range of motion without obvious weakness. Extremities No visible deformities, no cyanosis, clubbing or edema. Skin No rashes, scars, or lesions suggestive of malignancy. No petechiae, purpura, or ecchymoses. No excoriations. Neurologic No sensory or motor deficits, normal cerebellar function, normal gait, cranial nerves intact. Psychiatric Alert and oriented times three. Coherent speech. Verbalizes understanding of our discussions today. Lab/Data Review:  Recent Labs     06/27/22  2222 06/27/22  1250   WBC 7.3 8.6   HGB 11.3* 13.2   HCT 30.2* 36.2*    366     Recent Labs     06/28/22  0500 06/27/22 2222 06/27/22  1602 06/27/22  1250 06/27/22  1250   * 130*  --   --  131*   K 3.0* 3.4* 2.7*   < > 2.8*   CL 96* 93*  --   --  87*   CO2 27 27  --   --  28    118*  --   --  130*   BUN 12 14  --   --  14   CREA 0.72 0.90  --   --  1.16   CA 8.6 8.6  --   --  9.8   MG  --   --   --   --  1.5*   PHOS 0.5*  --   --   --   --    ALB 3.0* 3.0*  --   --  3.8   TBILI  --  0.7  --   --  1.1*   ALT  --  39  --   --  53    < > = values in this interval not displayed. No results for input(s): PH, PCO2, PO2, HCO3, FIO2 in the last 72 hours. Radiology:   US ABD COMP    Result Date: 6/29/2022  1. Hepatomegaly and hepatic parenchymal disease, most commonly due to hepatic steatosis. 2.  Biliary sludge. No cholelithiasis or evidence of cholecystitis. 3.  No splenomegaly. 4.  No hydronephrosis. Assessment /Plan:     Principal Problem:    Near syncope (6/29/2022)    Active Problems:    Hypokalemia (6/27/2022)      Tachycardia (6/27/2022)      Hypotension (6/27/2022)        3  47-year-old male admitted to the hospital with complaints of dizziness lightheadedness and near syncope.    He had electrolyte abnormalities with "breath with activity   Pulmonary: Shortness of breath with activity, Snoring   Gastrointestinal: None of the above   Genitourinary: None of the above   Hematological: None of the above   Neurological: None of the above   Female only: Regular menstrual cycles       EATING BEHAVIORS:     12/16/2020   Have you or anyone else thought that you had an eating disorder? No   Do you currently binge eat (eat a large amount of food in a short time)? No   Are you an emotional eater? No   Do you get up to eat after falling asleep? No     Difficult to decrease portion sizes   Eats till she is really full, difficult to stop sooner     EXERCISE:     12/16/2020   How often do you exercise? 3 to 4 times per week   What is the duration of your exercise (in minutes)? 30 Minutes   What types of exercise do you do? walking, weightlifting   What keeps you from being more active?  Too tired       MEDICATIONS:  Current Outpatient Medications   Medication Sig Dispense Refill     buPROPion (WELLBUTRIN XL) 150 MG 24 hr tablet Take 2 tablets (300 mg) by mouth every morning 60 tablet 0     cetirizine (ZYRTEC) 10 MG tablet Take 1 tablet (10 mg) by mouth daily 30 tablet 11     fluticasone (FLONASE) 50 MCG/ACT nasal spray Spray 2 sprays into both nostrils daily 16 g 2     ibuprofen (ADVIL/MOTRIN) 400 MG tablet Take 1 tablet (400 mg) by mouth every 6 hours as needed for moderate pain 100 tablet 4     ketoconazole (NIZORAL) 2 % external shampoo Apply to affected area 3 times per week 120 mL 3     lamoTRIgine (LAMICTAL) 25 MG tablet Take 50 mg by mouth daily         ALLERGIES:  Allergies   Allergen Reactions     Latex      Seasonal Allergies Itching       LABS/IMAGING/MEDICAL RECORDS REVIEW:     PHYSICAL EXAM:  Ht 1.676 m (5' 6\")   Wt 128.8 kg (284 lb)   BMI 45.84 kg/m        In summary, Monica Morales has Class III obesity with a body mass index of Body mass index is 45.84 kg/m . kg/m2 and the comorbidities stated above. She completed an " severe hypokalemia felt to be due to alcohol use.  -Evaluated by cardiology. .     2) Significant macrocytosis with mild anemia:  - Rest of the CBC is normal including a normal white count and platelets. -Marked macrocytosis due to alcohol abuse, , Y67 and folic acid  deficiencies.  -He is being repleted with Z15 and folic supplements. -recommended alcohol cessation. USg of the liver shows hepatomegaly with fatty changes. Spleen is normal. .  No evidence or hemolysis. Hapto is pending.     TSH is normal.          Sandy Monreal MD  6/29/2022 informational seminar and is a candidate for the laparoscopic gastric sleeve.  She will have to complete the following pre-requisites:    Received weight loss goal of 10 lb prior to surgery. 274lb   Dietitian x 6   Have preoperative laboratory tests drawn.  Psychological Evaluation with MMPI and clearance for weight loss surgery.  Letter of clearance from the following primary care provider, therapist, psychiatrist     Defer weight loss medications for now     Consent to receive bariatric support group info by email? yes  Preferred email: [email or web address]     Today in the office we discussed gastric sleeve surgery. Preoperative, perioperative, and postoperative processes, management, and follow up were addressed.  Risks and benefits were outlined including the risk of death, staple line leak (1-2%), PE, DVT, ulcer, worsening GERD, N/V, stricture, hernia, wound infection, weight regain, and vitamin deficiencies. I emphasized exercise and activity along with appropriate food choice as the main foundation for weight loss with surgery providing surgical reinforcement of this.  All questions were answered.  A goal sheet and support group handout were given to the patient.    Once the patient has completed the requirements in their task list and there are no further recommendations, the pt will be allowed to see the surgeon of her choice for consultation on the laparoscopic gastric sleeve surgery. Patient verbalizes understanding of the process to surgery and expectations for the postoperative period including the need for lifelong lifestyle changes, vitamin supplementation, and laboratory monitoring.      Is patient currently taking narcotic/opioid medications? NO  Sincerely,     Sadia Soares NP    I spent a total of 35 minutes face to face with the patient during today's office visit. Over 50% of this time was spent counseling the patient and/or coordinating care.    Bariatric Task List  Status:  Is patient a  candidate for bariatric surgery?:  patient is a candidate for bariatric surgery -     Cleared to schedule surgeon consult?:    -     Status:    -     Surgeon: Wise  -     Tentative surgery month/year: June 2021 -        Insurance: Insurance:  Blue Plus  -     Cigna: PCP Recommendation and Medical Clearance:    -     HP Referral:    -     Other:    -        Patient Info: Initial Weight:  284 -     Date of Initial Weight/Height:  12/16/2020 -     Goal Weight (lbs):  274 -     Required Weight Loss:  10 -     Surgery Type:  sleeve gastrectomy -     Multidisciplinary Meeting:    -        Dietician Visits: Structured weight loss required by insurance?:  structured weight loss required -     Dietician Visit 1:  Needed -     Dietician Visit 2:  Needed -     Dietician Visit 3:  Needed -     Dietician Visit 4:  Needed -     Dietician Visit 5:  Needed -     Dietician Visit 6:  Needed -     Dietician Visit additional:    -     Clearance from dietician to see surgeon?:    -     Dietician Notes:    -        Psychological Evaluation: Psych eval:  Needed -     Therapist letter of support:  Needed -     Psychiatrist letter of support:  Needed -     Establish care with therapist:    -     Complete eating disorder evaluation:    -     Letter of clearance from therapist/eating disorder program:    -     Other:    -        Lab Work: Complete Blood Count:  Needed -     Comprehensive Metabolic Panel:  Needed -     Vitamin D:  Needed -     Hgb A1c:  Needed -     PTH:  Needed -     H. pylori:    -     TSH:  Needed -     Nicotine Testing:    -     Other:  Needed - lipids      Consults/ Clearance: Sleep Medicine:    -     Cardiac:    -     Pain:   -     Dental:    -     Endocrine:    -     Gastroenterology:    -     Vascular Medicine:    -     Hematology:    -     Medical Weight Management:   -     Physical Therapy/Exercise:    -     Nephrology:    -     Neurology:    -     Pulmonology:    -     Rheumatology:    -     Other    -     Other     "-     Other    -           Testing: UGI:    -     EGD:    -     Other:    -        PCP: Establish care with PCP:  Completed -     Follow up with PCP:    -     PCP letter of support:  Needed -        Smoking: Quit tobacco use (3 months smoke free)?:    -     Quit date:    -        Patient Education:  Information Session:    -     Given \"Making your decision\" handout?:    -     Given support group information?:    -     Attended support group?:    -     Support plan in place?:    -     Research consents signed?:    -        Additional Surgery Requirements: Review Coag plan:    -     HgA1c <8:    -     Inpatient pain consult:    -     Final nicotine screen:    -     Dental work complete:    -     Birth control plan:    -     Other Requirements:    -     Other Requirements:    -        Final Tasks:  Before surgery online class:  Needed -     Before surgery online class website link:  [email or web address]   After surgery online class:  Needed -     After surgery online class website link:  [email or web address]   Nurse visit for weigh-in and information:  Needed -     Pre-assessment clinic visit with anesthesia team for H&P:  Needed -     Final labs (Hgb, plt, T&S, UA):  Needed -        Notes:   -            "

## 2022-06-29 NOTE — PROGRESS NOTES
Bedside and Verbal shift change report given to Lavinia Belcher RN (oncoming nurse) by Aravind Montenegro RN (offgoing nurse). Report included the following information SBAR, ED Summary, Intake/Output and MAR.

## 2022-06-29 NOTE — PROGRESS NOTES
PIV out upon entering room, patient at this time is refusing for RN to replace PIV. MD made informed, RN will attempt later to try to replace PIV.

## 2022-06-29 NOTE — PROGRESS NOTES
Problem: Falls - Risk of  Goal: *Absence of Falls  Description: Document Christophe Walls Fall Risk and appropriate interventions in the flowsheet. Outcome: Progressing Towards Goal  Note: Fall Risk Interventions:  Mobility Interventions:  (patient refused bed alarm)         Medication Interventions: Teach patient to arise slowly         History of Falls Interventions:  Investigate reason for fall         Problem: Patient Education: Go to Patient Education Activity  Goal: Patient/Family Education  Outcome: Progressing Towards Goal     Problem: General Medical Care Plan  Goal: *Vital signs within specified parameters  Outcome: Progressing Towards Goal  Goal: *Labs within defined limits  Outcome: Progressing Towards Goal  Goal: *Absence of infection signs and symptoms  Outcome: Progressing Towards Goal  Goal: *Optimal pain control at patient's stated goal  Outcome: Progressing Towards Goal  Goal: *Skin integrity maintained  Outcome: Progressing Towards Goal  Goal: *Fluid volume balance  Outcome: Progressing Towards Goal  Goal: *Optimize nutritional status  Outcome: Progressing Towards Goal  Goal: *Anxiety reduced or absent  Outcome: Progressing Towards Goal  Goal: *Progressive mobility and function (eg: ADL's)  Outcome: Progressing Towards Goal     Problem: Patient Education: Go to Patient Education Activity  Goal: Patient/Family Education  Outcome: Progressing Towards Goal     Problem: Pain  Goal: *Control of Pain  Outcome: Progressing Towards Goal  Goal: *PALLIATIVE CARE:  Alleviation of Pain  Outcome: Progressing Towards Goal     Problem: Patient Education: Go to Patient Education Activity  Goal: Patient/Family Education  Outcome: Progressing Towards Goal

## 2022-06-29 NOTE — PROGRESS NOTES
Hospitalist Progress Note         NINA Alford, REGINALD-C    Daily Progress Note: 6/29/2022      Subjective:   Subjective   Patient examined alert and oriented lying in bed. Reports left rib pain from fall. Review of Systems:   Review of Systems   Constitutional: Negative for chills and fever. Respiratory: Negative for cough and shortness of breath. Cardiovascular: Positive for chest pain. Left rib pain   Genitourinary: Negative for dysuria. Musculoskeletal: Positive for myalgias. Objective:   Objective      Vitals:  Patient Vitals for the past 12 hrs:   Temp Pulse Resp BP SpO2   06/29/22 0908 97.7 °F (36.5 °C) (!) 104 19 112/79 98 %   06/28/22 2321 -- (!) 118 -- (!) 120/92 --   06/28/22 2318 -- (!) 112 -- (!) 121/97 --   06/28/22 2316 -- (!) 101 -- (!) 139/99 --        Physical Exam:  Physical Exam  Vitals and nursing note reviewed. Constitutional:       Appearance: Normal appearance. Cardiovascular:      Rate and Rhythm: Tachycardia present. Pulmonary:      Breath sounds: Normal breath sounds. Abdominal:      General: Bowel sounds are normal.   Skin:     General: Skin is warm and dry. Neurological:      Mental Status: He is alert and oriented to person, place, and time.    Psychiatric:      Comments: Slightly agitated concerning questions about etoh usage          Lab Results:  Recent Results (from the past 24 hour(s))   TSH 3RD GENERATION    Collection Time: 06/28/22  9:53 PM   Result Value Ref Range    TSH 3.39 0.36 - 3.74 uIU/mL   LD    Collection Time: 06/28/22  9:53 PM   Result Value Ref Range     (H) 85 - 241 U/L   RETICULOCYTE COUNT    Collection Time: 06/28/22  9:53 PM   Result Value Ref Range    Reticulocyte count 0.9 0.7 - 2.1 %    Absolute Retic Cnt. 0.0240 (L) 0.0260 - 0.0950 M/ul          Diagnostic Images:  CT Results  (Last 48 hours)    None          Current Medications:    Current Facility-Administered Medications:     potassium chloride (KLOR-CON) packet for solution 40 mEq, 40 mEq, Oral, BID WITH MEALS, Saranya Goff, NP    magnesium oxide (MAG-OX) tablet 400 mg, 400 mg, Oral, BID, Kathryn Cristina MD, 400 mg at 06/29/22 0947    alum-mag hydroxide-simeth (MYLANTA) oral suspension 30 mL, 30 mL, Oral, Q4H PRN, Kathryn Cristina MD, 30 mL at 06/28/22 1506    cyanocobalamin (VITAMIN B12) injection 1,000 mcg, 1,000 mcg, SubCUTAneous, DAILY, Nalluri, Armen Matias MD, 1,000 mcg at 06/29/22 0947    traMADoL (ULTRAM) tablet 50 mg, 50 mg, Oral, Q6H PRN, Lorren Kanner, MD, 50 mg at 06/29/22 0837    pantoprazole (PROTONIX) tablet 40 mg, 40 mg, Oral, ACB, Stephan Rascon MD, 40 mg at 06/29/22 0834    sodium chloride (NS) flush 5-40 mL, 5-40 mL, IntraVENous, Q8H, Stephan Rascon MD, 10 mL at 06/29/22 0606    sodium chloride (NS) flush 5-40 mL, 5-40 mL, IntraVENous, PRN, Stephan Rascon MD    acetaminophen (TYLENOL) tablet 650 mg, 650 mg, Oral, Q6H PRN **OR** acetaminophen (TYLENOL) suppository 650 mg, 650 mg, Rectal, Q6H PRN, Stephan Rascon MD    ondansetron (ZOFRAN ODT) tablet 4 mg, 4 mg, Oral, Q6H PRN **OR** ondansetron (ZOFRAN) injection 4 mg, 4 mg, IntraVENous, Q6H PRN, Stephan Rascon MD, 4 mg at 06/28/22 1506    thiamine mononitrate (B-1) tablet 100 mg, 100 mg, Oral, DAILY, Stephan Rascon MD, 100 mg at 19/72/35 0610    folic acid (FOLVITE) tablet 1 mg, 1 mg, Oral, DAILY, Stephan Rascon MD, 1 mg at 06/29/22 0947    LORazepam (ATIVAN) injection 1 mg, 1 mg, IntraVENous, Q4H PRN, Stephan Rascon MD       ASSESSMENT:    The patient is seen for follow  up. 60 y. o. male history of hypertension presents to the emergency room complaining of dizziness associated with lightheadedness and almost passing out.  When he is trying to walk around he feels so dizzy falling, states he is trying to be close to the bed so that he can fall on the bed.    He is found with potassium of 2.5.  Was given potassium supplements.  In December he presented almost similar, found with hypokalemia was treated and discharged. [facility] has an EKG with left anterior fascicular block, and CBC macrocytosis with elevated MCV at 108.  It was significantly increased from December. Near Syncope   Most likely vasovagal  Head ct neg  Echo pending    Hypokalemia/Hypomagnesemia/Hypophosphatemia  Replenish electrolytes  AM labs pending    Chronic alcohol binging behavior  abd us shows Hepatomegaly and hepatic parenchymal disease and biliary sludge. No hydronephrosis  Cessation counseling provided    Left rib pain  cxr shows osteopenia and moderate grade thoracic spondylosis. Abnormal EKG with left anterior fascicular block  Prn pain management with tramadol    Hypertension  Lisinopril held  bp remains soft  Echo pending      Full Code  Dvt Prophylaxis up ad limb  GI Prophylaxis protonix  Discharge barriers:  -echo  -am labs, improvement in K  -cardiac clearance      Above treatment plan reviewed and discussed with patient in detail at bedside, all questions answered. Care Plan discussed with: Interdisciplinary team    Total time spent with patient: 30 minutes.     Nidhi Tellez NP

## 2022-06-29 NOTE — PROGRESS NOTES
Progress Note    Patient: Vesna Fallon MRN: [id number]  SSN: [id number]    YOB: 1965  Age: 60 y.o. Sex: male      Admit Date: 6/27/2022    LOS: 2 days     Subjective:     No acute events overnight    Objective:     Vitals:    06/28/22 2321 06/29/22 0800 06/29/22 0908 06/29/22 1149   BP: (!) 120/92  112/79 112/79   Pulse: (!) 118 (!) 103 (!) 104    Resp:   19    Temp:   97.7 °F (36.5 °C)    SpO2:   98%    Weight:    72.6 kg (160 lb)   Height:    5' 6.93\" (1.7 m)        Intake and Output:  Current Shift: No intake/output data recorded. Last three shifts: 06/27 1901 - 06/29 0700  In: 100 [I.V.:100]  Out: -     Physical Exam:   General:  Alert, cooperative, no distress, appears stated age. Eyes:  Conjunctivae/corneas clear. PERRL, EOMs intact. Fundi benign   Ears:  Normal TMs and external ear canals both ears. Nose: Nares normal. Septum midline. Mucosa normal. No drainage or sinus tenderness. Mouth/Throat: Lips, mucosa, and tongue normal. Teeth and gums normal.   Neck: Supple, symmetrical, trachea midline, no adenopathy, thyroid: no enlargment/tenderness/nodules, no carotid bruit and no JVD. Back:   Symmetric, no curvature. ROM normal. No CVA tenderness. Lungs:   Clear to auscultation bilaterally. Heart:  Regular rate and rhythm, S1, S2 normal, no murmur, click, rub or gallop. Abdomen:   Soft, non-tender. Bowel sounds normal. No masses,  No organomegaly. Extremities: Extremities normal, atraumatic, no cyanosis or edema. Pulses: 2+ and symmetric all extremities. Skin: Skin color, texture, turgor normal. No rashes or lesions   Lymph nodes: Cervical, supraclavicular, and axillary nodes normal.   Neurologic: CNII-XII intact. Normal strength, sensation and reflexes throughout. Lab/Data Review: All lab results for the last 24 hours reviewed.          Assessment:     Principal Problem:    Near syncope (6/29/2022)    Active Problems:    Hypokalemia (6/27/2022) Tachycardia (6/27/2022)      Hypotension (6/27/2022)    Patient is a 17-year-old white male with:  1. Chest pain, musculoskeletal  2. Hypertension  3. Left anterior physical block  4. Near syncope  5. Alcohol abuse  6. Severe hypokalemia  7. Moderate grade thoracic spondylosis  8. Osteopenia    Plan:     Rhythm was stable overnight. Currently normal sinus rhythm. Blood pressure is normal.  Tachycardic. Echo pending.     Signed By: Melvin Roca MD     June 29, 2022

## 2022-06-29 NOTE — PROGRESS NOTES
Reason for Admission:  Hypokalemia                     RUR Score:   9%                  Plan for utilizing home health:     No     PCP: First and Last name:  Ramses Butterfield     Name of Practice:    Are you a current patient: Yes/No: Yes   Approximate date of last visit:    Can you participate in a virtual visit with your PCP:                     Current Advanced Directive/Advance Care Plan: Full Code      Healthcare Decision Maker:   Click here to complete [address] including selection of the Healthcare Decision Maker Relationship (ie \"Primary\")             Primary Decision Maker: Fallon Limon - Mother - [phone]                  Transition of Care Plan:                      CM met with pt at the bedside to complete discharge assessment. Cm verified home address and emergency contact - John Winters (mother). Pt does not have a medical POA in place. Pt indicated CM can speak with his mother if needed. Pt lives with his mother. Pt ambulates without DME and is not current with home health. Pt reports his PCP is Dr. Ramses Butterfield. Pt did not have any questions for CM at this time. Cm will continue to follow.

## 2022-06-29 NOTE — PROGRESS NOTES
Problem: Falls - Risk of  Goal: *Absence of Falls  Description: Document Geraldo Maciel Fall Risk and appropriate interventions in the flowsheet.   Outcome: Progressing Towards Goal  Note: Fall Risk Interventions:                                Problem: Patient Education: Go to Patient Education Activity  Goal: Patient/Family Education  Outcome: Progressing Towards Goal     Problem: General Medical Care Plan  Goal: *Vital signs within specified parameters  Outcome: Progressing Towards Goal  Goal: *Labs within defined limits  Outcome: Progressing Towards Goal  Goal: *Absence of infection signs and symptoms  Outcome: Progressing Towards Goal  Goal: *Optimal pain control at patient's stated goal  Outcome: Progressing Towards Goal  Goal: *Skin integrity maintained  Outcome: Progressing Towards Goal  Goal: *Fluid volume balance  Outcome: Progressing Towards Goal  Goal: *Optimize nutritional status  Outcome: Progressing Towards Goal  Goal: *Anxiety reduced or absent  Outcome: Progressing Towards Goal  Goal: *Progressive mobility and function (eg: ADL's)  Outcome: Progressing Towards Goal     Problem: Patient Education: Go to Patient Education Activity  Goal: Patient/Family Education  Outcome: Progressing Towards Goal     Problem: Pain  Goal: *Control of Pain  Outcome: Progressing Towards Goal  Goal: *PALLIATIVE CARE:  Alleviation of Pain  Outcome: Progressing Towards Goal     Problem: Patient Education: Go to Patient Education Activity  Goal: Patient/Family Education  Outcome: Progressing Towards Goal

## 2022-06-30 VITALS
TEMPERATURE: 98.3 F | RESPIRATION RATE: 20 BRPM | HEIGHT: 67 IN | WEIGHT: 160 LBS | DIASTOLIC BLOOD PRESSURE: 77 MMHG | SYSTOLIC BLOOD PRESSURE: 113 MMHG | HEART RATE: 93 BPM | BODY MASS INDEX: 25.11 KG/M2 | OXYGEN SATURATION: 100 %

## 2022-06-30 LAB
MAGNESIUM SERPL-MCNC: 1.2 MG/DL (ref 1.6–2.4)
POTASSIUM SERPL-SCNC: 3.8 MMOL/L (ref 3.5–5.1)

## 2022-06-30 PROCEDURE — 84132 ASSAY OF SERUM POTASSIUM: CPT

## 2022-06-30 PROCEDURE — 83735 ASSAY OF MAGNESIUM: CPT

## 2022-06-30 PROCEDURE — 74011000250 HC RX REV CODE- 250: Performed by: NURSE PRACTITIONER

## 2022-06-30 PROCEDURE — 74011250637 HC RX REV CODE- 250/637: Performed by: HOSPITALIST

## 2022-06-30 PROCEDURE — 74011000250 HC RX REV CODE- 250: Performed by: HOSPITALIST

## 2022-06-30 PROCEDURE — 74011250637 HC RX REV CODE- 250/637: Performed by: NURSE PRACTITIONER

## 2022-06-30 PROCEDURE — 36415 COLL VENOUS BLD VENIPUNCTURE: CPT

## 2022-06-30 PROCEDURE — 74011250636 HC RX REV CODE- 250/636: Performed by: INTERNAL MEDICINE

## 2022-06-30 PROCEDURE — 74011250637 HC RX REV CODE- 250/637: Performed by: INTERNAL MEDICINE

## 2022-06-30 PROCEDURE — 74011250636 HC RX REV CODE- 250/636: Performed by: NURSE PRACTITIONER

## 2022-06-30 RX ORDER — LIDOCAINE 4 G/100G
1 PATCH TOPICAL EVERY 24 HOURS
Status: DISCONTINUED | OUTPATIENT
Start: 2022-06-30 | End: 2022-06-30 | Stop reason: HOSPADM

## 2022-06-30 RX ORDER — TRAMADOL HYDROCHLORIDE 50 MG/1
50 TABLET ORAL
Qty: 12 TABLET | Refills: 0 | Status: SHIPPED | OUTPATIENT
Start: 2022-06-30 | End: 2022-07-03

## 2022-06-30 RX ORDER — POTASSIUM CHLORIDE 20 MEQ/1
20 TABLET, EXTENDED RELEASE ORAL 2 TIMES DAILY
Qty: 28 TABLET | Refills: 0 | Status: SHIPPED | OUTPATIENT
Start: 2022-06-30 | End: 2022-07-14

## 2022-06-30 RX ORDER — ASPIRIN 325 MG/1
100 TABLET, FILM COATED ORAL DAILY
Qty: 30 TABLET | Refills: 0 | Status: SHIPPED | OUTPATIENT
Start: 2022-07-01 | End: 2022-07-31

## 2022-06-30 RX ORDER — POTASSIUM CHLORIDE 20 MEQ/1
20 TABLET, EXTENDED RELEASE ORAL 2 TIMES DAILY
Status: DISCONTINUED | OUTPATIENT
Start: 2022-06-30 | End: 2022-06-30 | Stop reason: HOSPADM

## 2022-06-30 RX ORDER — CYANOCOBALAMIN 1000 UG/ML
1000 INJECTION, SOLUTION INTRAMUSCULAR; SUBCUTANEOUS DAILY
Qty: 1 ML | Refills: 0 | Status: SHIPPED
Start: 2022-07-01 | End: 2022-07-06

## 2022-06-30 RX ORDER — LANOLIN ALCOHOL/MO/W.PET/CERES
400 CREAM (GRAM) TOPICAL 2 TIMES DAILY
Qty: 14 TABLET | Refills: 0 | Status: SHIPPED | OUTPATIENT
Start: 2022-06-30 | End: 2022-07-07

## 2022-06-30 RX ORDER — LIDOCAINE 4 G/100G
PATCH TOPICAL
Qty: 5 PATCH | Refills: 0 | OUTPATIENT
Start: 2022-07-01 | End: 2022-07-09

## 2022-06-30 RX ORDER — POTASSIUM CHLORIDE 20 MEQ/1
40 TABLET, EXTENDED RELEASE ORAL
Status: COMPLETED | OUTPATIENT
Start: 2022-06-30 | End: 2022-06-30

## 2022-06-30 RX ORDER — MAGNESIUM SULFATE HEPTAHYDRATE 40 MG/ML
2 INJECTION, SOLUTION INTRAVENOUS ONCE
Status: COMPLETED | OUTPATIENT
Start: 2022-06-30 | End: 2022-06-30

## 2022-06-30 RX ORDER — FOLIC ACID 1 MG/1
1 TABLET ORAL DAILY
Qty: 30 TABLET | Refills: 0 | Status: SHIPPED | OUTPATIENT
Start: 2022-07-01 | End: 2022-07-31

## 2022-06-30 RX ADMIN — THIAMINE HCL TAB 100 MG 100 MG: 100 TAB at 09:29

## 2022-06-30 RX ADMIN — MAGNESIUM SULFATE HEPTAHYDRATE 2 G: 40 INJECTION, SOLUTION INTRAVENOUS at 14:28

## 2022-06-30 RX ADMIN — Medication 400 MG: at 09:29

## 2022-06-30 RX ADMIN — CYANOCOBALAMIN 1000 MCG: 1000 INJECTION, SOLUTION INTRAMUSCULAR at 09:29

## 2022-06-30 RX ADMIN — TRAMADOL HYDROCHLORIDE 50 MG: 50 TABLET, COATED ORAL at 09:34

## 2022-06-30 RX ADMIN — PANTOPRAZOLE SODIUM 40 MG: 40 TABLET, DELAYED RELEASE ORAL at 08:13

## 2022-06-30 RX ADMIN — SODIUM CHLORIDE, PRESERVATIVE FREE 10 ML: 5 INJECTION INTRAVENOUS at 14:29

## 2022-06-30 RX ADMIN — POTASSIUM CHLORIDE 40 MEQ: 1500 TABLET, EXTENDED RELEASE ORAL at 09:29

## 2022-06-30 RX ADMIN — FOLIC ACID 1 MG: 1 TABLET ORAL at 09:29

## 2022-06-30 NOTE — DISCHARGE SUMMARY
Admit date: 6/27/2022   Admitting Provider: Jamal Fontana MD    Discharge date: 6/30/2022  Discharging Provider: Osmani Arce NP      * Admission Diagnoses: Hypokalemia [E87.6]  Hypotension [I95.9]    * Discharge Diagnoses:    Near Syncope   Tachycardia  Macrocytosis with mild anemia:  Chronic alcohol binging behavior  Hypokalemia/Hypomagnesemia/Hypophosphatemia      * Hospital Course: The patient is seen for follow  up. 60 y. o. male history of hypertension presents to the emergency room complaining of dizziness associated with lightheadedness and almost passing out.  When he is trying to walk around he feels so dizzy falling, states he is trying to be close to the bed so that he can fall on the bed. He is found with potassium of 2.5.  Was given potassium supplements.  In December he presented almost similar, found with hypokalemia was treated and discharged. [facility] has an EKG with left anterior fascicular block, and CBC macrocytosis with elevated MCV at 108.  It was significantly increased from December. Electrolytes abnormalities noted, replenished with PO and IV. abd us shows Hepatomegaly and hepatic parenchymal disease and biliary sludge. No hydronephrosis. cxr shows osteopenia and moderate grade thoracic spondylosis. Abnormal EKG with left anterior fascicular block. Cardiology consulted. Echo shows estimated EF of 55 - 60%, abnormal diastolic function. Patient stable for discharge pending cardiology clearance. * Procedures:   * No surgery found *      Consults: Cardiology    Significant Diagnostic Studies:   CXR Results  (Last 48 hours)                 06/27/22 1308   XR CHEST PA LAT Final result     Narrative:   Chest 2 views. Lungs clear; no interstitial or alveolar pole right edema. Cardiac and   mediastinal structures within normal limits. No pneumothorax or sizable pleural   effusion. Osteopenia. Moderate grade thoracic spondylosis. ABDOMINAL US: IMPRESSION  1.   Hepatomegaly and hepatic parenchymal disease, most commonly due to hepatic  steatosis. 2.  Biliary sludge. No cholelithiasis or evidence of cholecystitis. 3.  No splenomegaly. 4.  No hydronephrosis. Discharge Exam:  Physical Exam  Vitals and nursing note reviewed. Constitutional:       Appearance: Normal appearance. Eyes:      Extraocular Movements: Extraocular movements intact. Cardiovascular:      Rate and Rhythm: Normal rate. Pulmonary:      Breath sounds: Normal breath sounds. Abdominal:      General: Bowel sounds are normal.   Skin:     General: Skin is warm and dry. Neurological:      Mental Status: He is alert and oriented to person, place, and time. * Discharge Condition: improved  * Disposition: Home    Discharge Medications:  Current Discharge Medication List      START taking these medications    Details   cyanocobalamin (VITAMIN B12) 1,000 mcg/mL injection 1 mL by SubCUTAneous route daily for 5 doses. Indications: inadequate vitamin B12  Qty: 1 mL, Refills: 0  Start date: 7/1/2022, End date: 8/8/2676      folic acid (FOLVITE) 1 mg tablet Take 1 Tablet by mouth daily for 30 days. Qty: 30 Tablet, Refills: 0  Start date: 7/1/2022, End date: 7/31/2022      lidocaine 4 % patch Apply patch to left chest area . Apply patch to the affected area for 12 hours a day and remove for 12 hours a day. Qty: 5 Patch, Refills: 0  Start date: 7/1/2022      magnesium oxide (MAG-OX) 400 mg tablet Take 1 Tablet by mouth two (2) times a day for 7 days. Qty: 14 Tablet, Refills: 0  Start date: 6/30/2022, End date: 7/7/2022      thiamine mononitrate (B-1) 100 mg tablet Take 1 Tablet by mouth daily for 30 days. Qty: 30 Tablet, Refills: 0  Start date: 7/1/2022, End date: 7/31/2022      potassium chloride (K-DUR, KLOR-CON M20) 20 mEq tablet Take 1 Tablet by mouth two (2) times a day for 14 days.   Qty: 28 Tablet, Refills: 0  Start date: 6/30/2022, End date: 7/14/2022      traMADoL (ULTRAM) 50 mg tablet Take 1 Tablet by mouth every six (6) hours as needed for Pain for up to 3 days. Max Daily Amount: 200 mg. Qty: 12 Tablet, Refills: 0  Start date: 6/30/2022, End date: 7/3/2022    Associated Diagnoses: Colitis         CONTINUE these medications which have NOT CHANGED    Details   omeprazole (PRILOSEC) 40 mg capsule Take 40 mg by mouth Daily (before breakfast). Start date: 6/27/2022         STOP taking these medications       lisinopriL (PRINIVIL, ZESTRIL) 40 mg tablet Comments:   Reason for Stopping:         lisinopriL (PRINIVIL, ZESTRIL) 20 mg tablet Comments:   Reason for Stopping:         esomeprazole (NexIUM) 20 mg capsule Comments:   Reason for Stopping:               * Follow-up Care/Patient Instructions:   Activity: Activity as tolerated  Diet: Regular Diet  Wound Care: None needed    Follow-up Information     Follow up With Specialties Details Why Contact Info    Regine Cho MD Family Medicine In 1 week establishment of care [address] [place] [address]  [phone]      [address] Vascular Surgery, Interventional Cardiology Physician, Cardiovascular Disease Physician In 1 month  [address] [facility]  [phone]            Time Spent: > 35 minutes    CC: Regine Cho MD    Signed:  Olga Godinez NP  6/30/2022  1:47 PM

## 2022-06-30 NOTE — PROGRESS NOTES
Spiritual Care Assessment/Progress Note  [facility]      NAME: Luiz Fuentes      MRN: [id number]  AGE: 60 y.o.  SEX: male  Rastafari Affiliation: No preference   Language: English     6/30/2022     Total Time (in minutes): 30     Spiritual Assessment begun in [address] through conversation with:         [x]Patient        [] Family    [] Friend(s)        Reason for Consult: Initial/Spiritual assessment, patient floor,Request by staff     Spiritual beliefs: (Please include comment if needed)     [] Identifies with a latisha tradition:         [] Supported by a latisha community:            [] Claims no spiritual orientation:           [] Seeking spiritual identity:                [] Adheres to an individual form of spirituality:           [x] Not able to assess:                           Identified resources for coping:      [] Prayer                               [] Music                  [] Guided Imagery     [x] Family/friends                 [] Pet visits     [] Devotional reading                         [] Unknown     [] Other:                                              Interventions offered during this visit: (See comments for more details)    Patient Interventions: Affirmation of emotions/emotional suffering,Affirmation of latisha,Catharsis/review of pertinent events in supportive environment,Coping skills reviewed/reinforced,Guidance concerning next steps/process to be expected,Initial/Spiritual assessment, patient floor           Plan of Care:     [] Support spiritual and/or cultural needs    [] Support AMD and/or advance care planning process      [] Support grieving process   [] Coordinate Rites and/or Rituals    [] Coordination with community clergy   [] No spiritual needs identified at this time   [] Detailed Plan of Care below (See Comments)  [] Make referral to Music Therapy  [] Make referral to Pet Therapy     [] Make referral to Addiction services  [] Make referral to [facility]  [] Make referral to Spiritual Care Partner  [] No future visits requested        [x] Contact Spiritual Care for further referrals     Comments: The purpose of the visit was to do a spiritual assessment on the patient. The patient shared that he feels supported by his mother, but mentioned that she bothers him with her questions. He mentioned that he is in pain most of the time, and wished he felt heard by people around him. He shared that he found comfort in talking with the patient advocate, and hopes to speak with her again. The  listened empathically, encouraged ongoing plan of care, provided the ministry of spiritual care. [address] [facility] Yomaira Hung.    can be reached by calling the  at [facility]  [phone]

## 2022-06-30 NOTE — DISCHARGE INSTRUCTIONS
Patient Education     Colitis: Care Instructions  Your Care Instructions  Colitis is the medical term for swelling (inflammation) of the intestine. It can be caused by different things, such as an infection or loss of blood flow in the intestine. Other causes are problems like Crohn's disease or ulcerative colitis. Symptoms may include fever, diarrhea that may be bloody, or belly pain. Sometimes symptoms go away without treatment. But you may need treatment or more tests, such as blood tests or a stool test. Or you may need imaging tests like a CT scan or a colonoscopy. In some cases, the doctor may want to test a sample of tissue from the intestine. This test is called a biopsy. The doctor has checked you carefully, but problems can develop later. If you notice any problems or new symptoms, get medical treatment right away. Follow-up care is a key part of your treatment and safety. Be sure to make and go to all appointments, and call your doctor if you are having problems. It's also a good idea to know your test results and keep a list of the medicines you take. How can you care for yourself at home? · Rest until you feel better. · Your doctor may recommend that you eat bland foods. These include rice, dry toast or crackers, bananas, and applesauce. · To prevent dehydration, drink plenty of fluids. Choose water and other caffeine-free clear liquids until you feel better. If you have kidney, heart, or liver disease and have to limit fluids, talk with your doctor before you increase the amount of fluids you drink. · Be safe with medicines. Take your medicines exactly as prescribed. Call your doctor if you think you are having a problem with your medicine. You will get more details on the specific medicines your doctor prescribes. When should you call for help? Call 911 anytime you think you may need emergency care. For example, call if:  · You passed out (lost consciousness).   · You vomit blood or what looks like coffee grounds. · Your stools are maroon or very bloody. Call your doctor now or seek immediate medical care if:  · You have new or worse pain. · You have a new or higher fever. · You have new or worse symptoms. · You cannot keep fluids or medicines down. Watch closely for changes in your health, and be sure to contact your doctor if:  · You do not get better as expected. Where can you learn more? Go to [employer]Â [employer]Â [employer]  Enter [id number] in the search box to learn more about \"Colitis: Care Instructions. \"   © 4675-8799 Healthwise, Incorporated. Care instructions adapted under license by [facility] (which disclaims liability or warranty for this information). This care instruction is for use with your licensed healthcare professional. If you have questions about a medical condition or this instruction, always ask your healthcare professional. [address] any warranty or liability for your use of this information.   Content Version: 62.3.463019; Current as of: November 20, 2015

## 2022-06-30 NOTE — PROGRESS NOTES
Patient informed of discharge, states I am not going anywhere until somebody let me know I am discharge, informed patient of the discharge order states I just ordered my dinner, reassured can eat dinner and then be discharged.

## 2022-06-30 NOTE — PROGRESS NOTES
Patient informed of order for Magnesium rum and potassium orally. States they did not tell me. Patient does not have an iv site. States you can do what you have to do.  at bedside.

## 2022-06-30 NOTE — PROGRESS NOTES
Problem: Falls - Risk of  Goal: *Absence of Falls  Description: Document Leafy Pfeiffer Fall Risk and appropriate interventions in the flowsheet.   Outcome: Progressing Towards Goal  Note: Fall Risk Interventions:  Mobility Interventions: Patient to call before getting OOB         Medication Interventions: Patient to call before getting OOB         History of Falls Interventions:  (refused bed alarm)         Problem: General Medical Care Plan  Goal: *Anxiety reduced or absent  Outcome: Progressing Towards Goal     Problem: General Medical Care Plan  Goal: *Fluid volume balance  Outcome: Progressing Towards Goal     Problem: General Medical Care Plan  Goal: *Skin integrity maintained  Outcome: Progressing Towards Goal     Problem: General Medical Care Plan  Goal: *Optimal pain control at patient's stated goal  Outcome: Progressing Towards Goal

## 2022-06-30 NOTE — PROGRESS NOTES
Hematology Oncology Progress Note     NO interval changes or events. Subjective:     HE feels better. Getting pain med for the rib pain. States it still hurts him. Receiving oral K replacement. Currently on CIWA protocol. Feels balance is normal.     Current Facility-Administered Medications   Medication Dose Route Frequency    lidocaine 4 % patch 1 Patch  1 Patch TransDERmal Q24H    magnesium sulfate 2 g/50 ml IVPB (premix or compounded)  2 g IntraVENous ONCE    potassium chloride (K-DUR, KLOR-CON M20) SR tablet 20 mEq  20 mEq Oral BID    magnesium oxide (MAG-OX) tablet 400 mg  400 mg Oral BID    alum-mag hydroxide-simeth (MYLANTA) oral suspension 30 mL  30 mL Oral Q4H PRN    cyanocobalamin (VITAMIN B12) injection 1,000 mcg  1,000 mcg SubCUTAneous DAILY    traMADoL (ULTRAM) tablet 50 mg  50 mg Oral Q6H PRN    pantoprazole (PROTONIX) tablet 40 mg  40 mg Oral ACB    sodium chloride (NS) flush 5-40 mL  5-40 mL IntraVENous Q8H    sodium chloride (NS) flush 5-40 mL  5-40 mL IntraVENous PRN    acetaminophen (TYLENOL) tablet 650 mg  650 mg Oral Q6H PRN    Or    acetaminophen (TYLENOL) suppository 650 mg  650 mg Rectal Q6H PRN    ondansetron (ZOFRAN ODT) tablet 4 mg  4 mg Oral Q6H PRN    Or    ondansetron (ZOFRAN) injection 4 mg  4 mg IntraVENous Q6H PRN    thiamine mononitrate (B-1) tablet 100 mg  100 mg Oral DAILY    folic acid (FOLVITE) tablet 1 mg  1 mg Oral DAILY    LORazepam (ATIVAN) injection 1 mg  1 mg IntraVENous Q4H PRN        Review of Systems:    Constitutional No fevers, chills, night sweats, excessive fatigue or weight loss. Allergic/Immunologic No recent allergic reactions   Eyes No significant visual difficulties. No diplopia. ENMT No problems with hearing, no sore throat, no sinus drainage. Endocrine No hot flashes or night sweats. No cold intolerance, polyuria, or polydipsia   Hematologic/Lymphatic No easy bruising or bleeding.   The patient denies any tender or palpable lymph nodes   Breasts No abnormal masses of breast, nipple discharge or pain. Respiratory No dyspnea on exertion, orthopnea, chest pain, cough or hemoptysis. Cardiovascular No anginal chest pain, irregular heart beat, tachycardia, palpitations or orthopnea. Gastrointestinal No nausea, vomiting, diarrhea, constipation, cramping, dysphagia, reflux, heartburn, GI bleeding, or early satiety. No change in bowel habits. Genitourinary (M) No hematuria, dysuria, increased frequency, urgency, hesitancy or incontinence. Musculoskeletal No joint pain, swelling or redness. No decreased range of motion. Integumentary No chronic rashes, inflammation, ulcerations, pruritus, petechiae, purpura, ecchymoses, or skin changes. Neurologic No headache, blurred vision, and no areas of focal weakness or numbness. Normal gait. No sensory problems. Psychiatric No insomnia, depression, ceasar or mood swings. No psychotropic drugs. Objective:     Patient Vitals for the past 8 hrs:   BP Temp Pulse Resp SpO2   22 1444 113/77 98.3 °F (36.8 °C) 93 20 100 %   22 0834 102/88 97.9 °F (36.6 °C) 99 20 100 %       Temp (24hrs), Av.1 °F (36.7 °C), Min:97.9 °F (36.6 °C), Max:98.3 °F (36.8 °C)      Physical Exam:    Constitutional White male , Alert, cooperative, oriented. Mood and affect appropriate. Appears close to chronological age. Well nourished. Well developed. Head Normocephalic; no scars   Eyes Conjunctivae and sclerae are clear and without icterus. Pupils are reactive and equal.   ENMT Sinuses are nontender. No oral exudates, ulcers, masses, thrush or mucositis. Oropharynx clear. Tongue normal.   Neck Supple without masses or thyromegaly. No jugular venous distension. Hematologic/Lymphatic No petechiae or purpura. No tender or palpable lymph nodes in the cervical, supraclavicular, axillary or inguinal area. Respiratory Lungs are clear to auscultation without rhonchi or wheezing.    Cardiovascular Regular rate and rhythm of heart without murmurs, gallops or rubs. Chest / Line Site Chest is symmetric with no chest wall deformities. Abdomen Non-tender, non-distended, no masses, ascites or hepatosplenomegaly. Good bowel sounds. No guarding or rebound tenderness. No pulsatile masses. Musculoskeletal No tenderness or swelling, normal range of motion without obvious weakness. Extremities No visible deformities, no cyanosis, clubbing or edema. Skin No rashes, scars, or lesions suggestive of malignancy. No petechiae, purpura, or ecchymoses. No excoriations. Neurologic No sensory or motor deficits, normal cerebellar function, normal gait, cranial nerves intact. Psychiatric Alert and oriented times three. Coherent speech. Verbalizes understanding of our discussions today. Lab/Data Review:  Recent Labs     06/27/22 2222   WBC 7.3   HGB 11.3*   HCT 30.2*        Recent Labs     06/30/22  1113 06/29/22  1124 06/28/22  0500 06/27/22 2222 06/27/22 2222   NA  --  138 133*  --  130*   K 3.8 3.1* 3.0*   < > 3.4*   CL  --  100 96*  --  93*   CO2  --  28 27  --  27   GLU  --  121* 100  --  118*   BUN  --  8 12  --  14   CREA  --  0.82 0.72  --  0.90   CA  --  8.0* 8.6  --  8.6   MG 1.2*  --   --   --   --    PHOS  --   --  0.5*  --   --    ALB  --   --  3.0*  --  3.0*   TBILI  --   --   --   --  0.7   ALT  --   --   --   --  39    < > = values in this interval not displayed. No results for input(s): PH, PCO2, PO2, HCO3, FIO2 in the last 72 hours. Radiology:   US ABD COMP    Result Date: 6/29/2022  1. Hepatomegaly and hepatic parenchymal disease, most commonly due to hepatic steatosis. 2.  Biliary sludge. No cholelithiasis or evidence of cholecystitis. 3.  No splenomegaly. 4.  No hydronephrosis. Assessment /Plan:     Principal Problem:    Near syncope (6/29/2022)    Active Problems:    Hypokalemia (6/27/2022)      Tachycardia (6/27/2022)      Hypotension (6/27/2022)        1. 58-year-old male admitted to the hospital with complaints of dizziness lightheadedness and near syncope. He had electrolyte abnormalities with severe hypokalemia felt to be due to alcohol use.  -Evaluated by cardiology. .     2) Significant macrocytosis with mild anemia:  - Rest of the CBC is normal including a normal white count and platelets. -Marked macrocytosis due to alcohol abuse, , B08 and folic acid  deficiencies. .  Potassium is better magnesium is still low and is getting replacements  -He is being repleted with M67 and folic supplements. -recommended alcohol cessation.  -Repeat CBC in a.m.    USg of the liver shows hepatomegaly with fatty changes. Spleen is normal. .  No evidence or hemolysis.  Hapto is normal.  TSH is normal.          Paige Price MD  6/30/2022

## 2022-06-30 NOTE — PROGRESS NOTES
Progress Note    Patient: Lilo De La Fuente MRN: [id number]  SSN: [id number]    YOB: 1965  Age: 60 y.o. Sex: male      Admit Date: 6/27/2022    LOS: 3 days     Subjective:     No acute events overnight    Objective:     Vitals:    06/29/22 1952 06/29/22 2346 06/30/22 0834 06/30/22 1444   BP: 115/74 103/77 102/88 113/77   Pulse: 97 98 99 93   Resp: 18 20 20 20   Temp: 98.3 °F (36.8 °C) 97.9 °F (36.6 °C) 97.9 °F (36.6 °C) 98.3 °F (36.8 °C)   SpO2: 100% 99% 100% 100%   Weight:       Height:            Intake and Output:  Current Shift: No intake/output data recorded. Last three shifts: No intake/output data recorded. Physical Exam:   General:  Alert, cooperative, no distress, appears stated age. Eyes:  Conjunctivae/corneas clear. PERRL, EOMs intact. Fundi benign   Ears:  Normal TMs and external ear canals both ears. Nose: Nares normal. Septum midline. Mucosa normal. No drainage or sinus tenderness. Mouth/Throat: Lips, mucosa, and tongue normal. Teeth and gums normal.   Neck: Supple, symmetrical, trachea midline, no adenopathy, thyroid: no enlargment/tenderness/nodules, no carotid bruit and no JVD. Back:   Symmetric, no curvature. ROM normal. No CVA tenderness. Lungs:   Clear to auscultation bilaterally. Heart:  Regular rate and rhythm, S1, S2 normal, no murmur, click, rub or gallop. Abdomen:   Soft, non-tender. Bowel sounds normal. No masses,  No organomegaly. Extremities: Extremities normal, atraumatic, no cyanosis or edema. Pulses: 2+ and symmetric all extremities. Skin: Skin color, texture, turgor normal. No rashes or lesions   Lymph nodes: Cervical, supraclavicular, and axillary nodes normal.   Neurologic: CNII-XII intact. Normal strength, sensation and reflexes throughout. Lab/Data Review: All lab results for the last 24 hours reviewed.          Assessment:     Principal Problem:    Near syncope (6/29/2022)    Active Problems:    Hypokalemia (6/27/2022) Tachycardia (6/27/2022)      Hypotension (6/27/2022)    Patient is a 47-year-old white male with:  1. Chest pain, musculoskeletal  2. Hypertension  3. Left anterior physical block  4. Near syncope  5. Alcohol abuse  6. Severe hypokalemia  7. Moderate grade thoracic spondylosis  8. Osteopenia    Plan:   Echocardiogram shows normal LV systolic function without significant valvular pathology. Potassium is 3.8. Magnesium 1.2. Replete magnesium. Okay to discharge from cardiac standpoint. I will see him in 2 weeks after discharge. Consider stress test.  He is counseled on the importance of cutting back on his alcohol intake.     Signed By: Yara Ramirez MD     June 30, 2022

## 2022-06-30 NOTE — PROGRESS NOTES
Patient crying out in pain to left chest area hold breast area curing and using foul language, states pain worst than before. States I can't go on like this. Tramadol given as ordered. Elidia Hernandez NP notified.

## 2022-06-30 NOTE — PROGRESS NOTES
Attempt made to start iv site for magnesium run unsuccessful. Patient reassured will check with PICC team. PICC team on unit, requested a start, order placed.

## 2022-06-30 NOTE — PROGRESS NOTES
[facility] PICC team consulted for PIV insertion. PIV inserted with ultrasound x 1 attempt without complications. Patient tolerated well, no complaints. 06/30/22 1425   Peripheral IV 06/30/22 Anterior;Right Forearm   Placement Date/Time: (c) 06/30/22 1425   Number of Attempts: 1  Inserted By: Linda Doll RN  Present on Admission/Arrival: No  Size: (c) 20 G  Orientation: Anterior;Right  Location: (c) Forearm   Site Assessment Clean, dry, & intact   Phlebitis Assessment 0   Infiltration Assessment 0   Dressing Status Clean, dry, & intact; New   Dressing Type Transparent;Tape   Hub Color/Line Status Pink;Flushed;Patent;Capped   Action Taken Open ports on tubing capped   Alcohol Cap Used Yes

## 2022-07-09 ENCOUNTER — HOSPITAL ENCOUNTER (EMERGENCY)
Age: 57
Discharge: HOME | End: 2022-07-09
Attending: EMERGENCY MEDICINE
Payer: MEDICAID

## 2022-07-09 VITALS
TEMPERATURE: 97.8 F | HEART RATE: 98 BPM | SYSTOLIC BLOOD PRESSURE: 174 MMHG | DIASTOLIC BLOOD PRESSURE: 99 MMHG | BODY MASS INDEX: 25.71 KG/M2 | RESPIRATION RATE: 16 BRPM | HEIGHT: 66 IN | OXYGEN SATURATION: 99 % | WEIGHT: 160 LBS

## 2022-07-09 DIAGNOSIS — R07.89 CHEST WALL PAIN: Primary | ICD-10-CM

## 2022-07-09 PROCEDURE — 93005 ELECTROCARDIOGRAM TRACING: CPT

## 2022-07-09 PROCEDURE — 99283 EMERGENCY DEPT VISIT LOW MDM: CPT

## 2022-07-09 PROCEDURE — 74011000250 HC RX REV CODE- 250: Performed by: EMERGENCY MEDICINE

## 2022-07-09 RX ORDER — LIDOCAINE 50 MG/G
PATCH TOPICAL
Qty: 1 EACH | Refills: 0 | Status: SHIPPED | OUTPATIENT
Start: 2022-07-09

## 2022-07-09 RX ORDER — TRAMADOL HYDROCHLORIDE 50 MG/1
50 TABLET ORAL
Qty: 10 TABLET | Refills: 0 | Status: SHIPPED | OUTPATIENT
Start: 2022-07-09 | End: 2022-07-12

## 2022-07-09 RX ORDER — TRAMADOL HYDROCHLORIDE 50 MG/1
50 TABLET ORAL
Qty: 10 TABLET | Refills: 0 | Status: SHIPPED | OUTPATIENT
Start: 2022-07-09 | End: 2022-07-09 | Stop reason: SDUPTHER

## 2022-07-09 RX ORDER — LIDOCAINE 4 G/100G
1 PATCH TOPICAL EVERY 24 HOURS
Status: DISCONTINUED | OUTPATIENT
Start: 2022-07-09 | End: 2022-07-09 | Stop reason: HOSPADM

## 2022-07-09 NOTE — ED TRIAGE NOTES
PT. TO ED WITH C/O LEFT SIDED CHEST PAIN S/P FALL 6/26/22. PT. STATES [facility]. PT. STATES RAN OUT OF TRAMADOL.

## 2022-07-09 NOTE — DISCHARGE INSTRUCTIONS
Continue Alleve and Tylenol together as needed for pain. Warm compresses  and lidocaine patches. Tramadol for more severe pain.

## 2022-07-09 NOTE — ED PROVIDER NOTES
EMERGENCY DEPARTMENT HISTORY AND PHYSICAL EXAM      Date: 7/9/2022  Patient Name: Janette Haywood    History of Presenting Illness     Chief Complaint   Patient presents with   [employer] Fall    Chest Pain       History Provided By: Patient    HPI: Janette Haywood, 59 y.o. male with a past medical history significant hypertension, Covid infection presents to the ED with cc of left sided chest pain from fall 2 weeks ago. Pt states he was seen at the time and admitted for hypokalemia, discharged home with Tramadol for pain. Had no  Acute findings on CXR or abd  US at that time. States he has run out and pain continues. No new injuries. No fever, sob or hemoptysis. PCP: Lo Estes MD    No current facility-administered medications on file prior to encounter. Current Outpatient Medications on File Prior to Encounter   Medication Sig Dispense Refill    thiamine mononitrate (B-1) 100 mg tablet Take 1 Tablet by mouth daily for 30 days. 30 Tablet 0    potassium chloride (K-DUR, KLOR-CON M20) 20 mEq tablet Take 1 Tablet by mouth two (2) times a day for 14 days. 28 Tablet 0    omeprazole (PRILOSEC) 40 mg capsule Take 40 mg by mouth Daily (before breakfast).  folic acid (FOLVITE) 1 mg tablet Take 1 Tablet by mouth daily for 30 days. 30 Tablet 0       Past History     Past Medical History:  Past Medical History:   Diagnosis Date    Gastrointestinal disorder     Hypertension        Past Surgical History:  History reviewed. No pertinent surgical history. Family History:  History reviewed. No pertinent family history. Social History:  Social History     Tobacco Use    Smoking status: Current Every Day Smoker     Packs/day: 0.50    Smokeless tobacco: Never Used   Substance Use Topics    Alcohol use: Yes     Comment: daily    Drug use: Never       Allergies:  No Known Allergies      Review of Systems   Review of Systems   Constitutional: Negative for fever.    Respiratory: Negative for shortness of breath. Cardiovascular: Positive for chest pain. Negative for palpitations and leg swelling. Gastrointestinal: Negative for nausea and vomiting. Genitourinary: Negative for flank pain. Skin: Negative for wound. All other systems reviewed and are negative. Physical Exam   Physical Exam  Vitals and nursing note reviewed. Constitutional:       Appearance: Normal appearance. He is not ill-appearing. HENT:      Head: Normocephalic and atraumatic. Nose: Nose normal.      Mouth/Throat:      Mouth: Mucous membranes are moist.      Pharynx: No oropharyngeal exudate or posterior oropharyngeal erythema. Eyes:      General: No scleral icterus. Extraocular Movements: Extraocular movements intact. Pupils: Pupils are equal, round, and reactive to light. Neck:      Vascular: No JVD. Cardiovascular:      Rate and Rhythm: Normal rate and regular rhythm. Pulses: Normal pulses. Heart sounds: Normal heart sounds. Pulmonary:      Effort: Pulmonary effort is normal.      Breath sounds: Normal breath sounds. No wheezing, rhonchi or rales. Chest:      Chest wall: Tenderness present. Comments: TTP along  Left  Lower rib margin, no contusions or crepitus. Abdominal:      General: Bowel sounds are normal.      Palpations: Abdomen is soft. Tenderness: There is no abdominal tenderness. Musculoskeletal:         General: Normal range of motion. Cervical back: Normal range of motion and neck supple. Right lower leg: No edema. Left lower leg: No edema. Skin:     General: Skin is warm and dry. Findings: No rash. Neurological:      General: No focal deficit present. Mental Status: He is alert and oriented to person, place, and time.       Comments: Nl gross motor/sensory exam without any focal or lateralizing deficits   Psychiatric:         Mood and Affect: Mood normal.         Behavior: Behavior normal.         Diagnostic Study Results     Labs -   No results found for this or any previous visit (from the past 12 hour(s)). Radiologic Studies -   No orders to display     CT Results  (Last 48 hours)    None        CXR Results  (Last 48 hours)    None            Medical Decision Making   I am the first provider for this patient. I reviewed the vital signs, available nursing notes, past medical history, past surgical history, family history and social history. Vital Signs-Reviewed the patient's vital signs. No data found. Records Reviewed: Nursing Notes and Old Medical Records hospitalized 6/30/22 as in h&p    Provider Notes (Medical Decision Making): Impression: MSK pain    ED Course:   Initial assessment performed. The patients presenting problems have been discussed, and they are in agreement with the care plan formulated and outlined with them. I have encouraged them to ask questions as they arise throughout their visit. Pt with benign exam, 99% RA. ECG shows NSR at 91, nl axis, intervals,  No st-t changes, normal ecg      Discussed symptomatic care, will give short course of additional Tramadol, f/u PCP    PLAN:  1. Discharge Medication List as of 7/9/2022  9:13 AM      START taking these medications    Details   lidocaine (LIDODERM) 5 % Apply patch to the affected area for 12 hours a day and remove for 12 hours a day., Normal, Disp-1 Each, R-0         CONTINUE these medications which have CHANGED    Details   traMADoL (ULTRAM) 50 mg tablet Take 1 Tablet by mouth every six (6) hours as needed for Pain for up to 3 days. Max Daily Amount: 200 mg. Indications: pain, Normal, Disp-10 Tablet, R-0         CONTINUE these medications which have NOT CHANGED    Details   thiamine mononitrate (B-1) 100 mg tablet Take 1 Tablet by mouth daily for 30 days. , Normal, Disp-30 Tablet, R-0      potassium chloride (K-DUR, KLOR-CON M20) 20 mEq tablet Take 1 Tablet by mouth two (2) times a day for 14 days. , Normal, Disp-28 Tablet, R-0      omeprazole (PRILOSEC) 40 mg capsule Take 40 mg by mouth Daily (before breakfast). , Historical Med      folic acid (FOLVITE) 1 mg tablet Take 1 Tablet by mouth daily for 30 days. , Normal, Disp-30 Tablet, R-0           2. Follow-up Information     Follow up With Specialties Details Why Contact Info    Shaquille Ma MD Internal Medicine Physician   [address] [id number]  [phone]      Samaria Paris MD Family Medicine  Follow  up Monday as scheduled. [address] [place] [address]  [phone]          Return to ED if worse     Diagnosis     Clinical Impression:   1.  Chest wall pain

## 2022-07-12 LAB
ATRIAL RATE: 91 BPM
CALCULATED P AXIS, ECG09: 27 DEGREES
CALCULATED R AXIS, ECG10: -17 DEGREES
CALCULATED T AXIS, ECG11: 26 DEGREES
DIAGNOSIS, 93000: NORMAL
P-R INTERVAL, ECG05: 152 MS
Q-T INTERVAL, ECG07: 346 MS
QRS DURATION, ECG06: 78 MS
QTC CALCULATION (BEZET), ECG08: 425 MS
VENTRICULAR RATE, ECG03: 91 BPM

## 2023-07-07 ENCOUNTER — HOSPITAL ENCOUNTER (INPATIENT)
Facility: HOSPITAL | Age: 58
LOS: 3 days | Discharge: HOME | End: 2023-07-10
Attending: EMERGENCY MEDICINE | Admitting: INTERNAL MEDICINE
Payer: MEDICAID

## 2023-07-07 ENCOUNTER — APPOINTMENT (OUTPATIENT)
Facility: HOSPITAL | Age: 58
End: 2023-07-07
Payer: MEDICAID

## 2023-07-07 DIAGNOSIS — E86.1 HYPOTENSION DUE TO HYPOVOLEMIA: ICD-10-CM

## 2023-07-07 DIAGNOSIS — F10.10 ALCOHOL ABUSE: ICD-10-CM

## 2023-07-07 DIAGNOSIS — E87.6 HYPOKALEMIA: Primary | ICD-10-CM

## 2023-07-07 DIAGNOSIS — E83.42 HYPOMAGNESEMIA: ICD-10-CM

## 2023-07-07 DIAGNOSIS — R42 LIGHTHEADEDNESS: ICD-10-CM

## 2023-07-07 DIAGNOSIS — I95.89 HYPOTENSION DUE TO HYPOVOLEMIA: ICD-10-CM

## 2023-07-07 DIAGNOSIS — R11.2 NAUSEA AND VOMITING, UNSPECIFIED VOMITING TYPE: ICD-10-CM

## 2023-07-07 LAB
ALBUMIN SERPL-MCNC: 3.4 G/DL (ref 3.5–5)
ALBUMIN/GLOB SERPL: 1 (ref 1.1–2.2)
ALP SERPL-CCNC: 148 U/L (ref 45–117)
ALT SERPL-CCNC: 34 U/L (ref 12–78)
AMPHET UR QL SCN: NEGATIVE
ANION GAP SERPL CALC-SCNC: 14 MMOL/L (ref 5–15)
AST SERPL W P-5'-P-CCNC: 119 U/L (ref 15–37)
BARBITURATES UR QL SCN: NEGATIVE
BASOPHILS # BLD: 0 K/UL (ref 0–0.1)
BASOPHILS NFR BLD: 0 % (ref 0–1)
BENZODIAZ UR QL: NEGATIVE
BILIRUB SERPL-MCNC: 0.8 MG/DL (ref 0.2–1)
BUN SERPL-MCNC: 6 MG/DL (ref 6–20)
BUN/CREAT SERPL: 11 (ref 12–20)
CA-I BLD-MCNC: 8.9 MG/DL (ref 8.5–10.1)
CANNABINOIDS UR QL SCN: NEGATIVE
CHLORIDE SERPL-SCNC: 94 MMOL/L (ref 97–108)
CO2 SERPL-SCNC: 28 MMOL/L (ref 21–32)
COCAINE UR QL SCN: NEGATIVE
CREAT SERPL-MCNC: 0.57 MG/DL (ref 0.7–1.3)
DIFFERENTIAL METHOD BLD: ABNORMAL
EOSINOPHIL # BLD: 0 K/UL (ref 0–0.4)
EOSINOPHIL NFR BLD: 0 % (ref 0–7)
ERYTHROCYTE [DISTWIDTH] IN BLOOD BY AUTOMATED COUNT: 19.9 % (ref 11.5–14.5)
ETHANOL SERPL-MCNC: 13 MG/DL (ref 0–0.08)
GLOBULIN SER CALC-MCNC: 3.4 G/DL (ref 2–4)
GLUCOSE SERPL-MCNC: 114 MG/DL (ref 65–100)
HCT VFR BLD AUTO: 34.4 % (ref 36.6–50.3)
HGB BLD-MCNC: 12.3 G/DL (ref 12.1–17)
IMM GRANULOCYTES # BLD AUTO: 0.2 K/UL (ref 0–0.04)
IMM GRANULOCYTES NFR BLD AUTO: 2 % (ref 0–0.5)
LYMPHOCYTES # BLD: 1.2 K/UL (ref 0.8–3.5)
LYMPHOCYTES NFR BLD: 13 % (ref 12–49)
Lab: NORMAL
MAGNESIUM SERPL-MCNC: 1.1 MG/DL (ref 1.6–2.4)
MAGNESIUM SERPL-MCNC: 1.2 MG/DL (ref 1.6–2.4)
MCH RBC QN AUTO: 36.6 PG (ref 26–34)
MCHC RBC AUTO-ENTMCNC: 35.8 G/DL (ref 30–36.5)
MCV RBC AUTO: 102.4 FL (ref 80–99)
METHADONE UR QL: NEGATIVE
MONOCYTES # BLD: 0.4 K/UL (ref 0–1)
MONOCYTES NFR BLD: 4 % (ref 5–13)
NEUTS SEG # BLD: 7.9 K/UL (ref 1.8–8)
NEUTS SEG NFR BLD: 81 % (ref 32–75)
NRBC # BLD: 0.03 K/UL (ref 0–0.01)
NRBC BLD-RTO: 0.3 PER 100 WBC
OPIATES UR QL: NEGATIVE
PCP UR QL: NEGATIVE
PLATELET # BLD AUTO: 300 K/UL (ref 150–400)
PMV BLD AUTO: 9.6 FL (ref 8.9–12.9)
POTASSIUM SERPL-SCNC: 2.4 MMOL/L (ref 3.5–5.1)
PROT SERPL-MCNC: 6.8 G/DL (ref 6.4–8.2)
RBC # BLD AUTO: 3.36 M/UL (ref 4.1–5.7)
SODIUM SERPL-SCNC: 136 MMOL/L (ref 136–145)
TROPONIN I SERPL HS-MCNC: 10 NG/L (ref 0–76)
WBC # BLD AUTO: 9.7 K/UL (ref 4.1–11.1)

## 2023-07-07 PROCEDURE — 84484 ASSAY OF TROPONIN QUANT: CPT

## 2023-07-07 PROCEDURE — 96375 TX/PRO/DX INJ NEW DRUG ADDON: CPT

## 2023-07-07 PROCEDURE — 85025 COMPLETE CBC W/AUTO DIFF WBC: CPT

## 2023-07-07 PROCEDURE — 80307 DRUG TEST PRSMV CHEM ANLYZR: CPT

## 2023-07-07 PROCEDURE — 96361 HYDRATE IV INFUSION ADD-ON: CPT

## 2023-07-07 PROCEDURE — 93005 ELECTROCARDIOGRAM TRACING: CPT | Performed by: EMERGENCY MEDICINE

## 2023-07-07 PROCEDURE — 36415 COLL VENOUS BLD VENIPUNCTURE: CPT

## 2023-07-07 PROCEDURE — 80053 COMPREHEN METABOLIC PANEL: CPT

## 2023-07-07 PROCEDURE — 6370000000 HC RX 637 (ALT 250 FOR IP): Performed by: INTERNAL MEDICINE

## 2023-07-07 PROCEDURE — 83735 ASSAY OF MAGNESIUM: CPT

## 2023-07-07 PROCEDURE — 2580000003 HC RX 258: Performed by: EMERGENCY MEDICINE

## 2023-07-07 PROCEDURE — 70450 CT HEAD/BRAIN W/O DYE: CPT

## 2023-07-07 PROCEDURE — 6370000000 HC RX 637 (ALT 250 FOR IP): Performed by: EMERGENCY MEDICINE

## 2023-07-07 PROCEDURE — 6360000002 HC RX W HCPCS: Performed by: INTERNAL MEDICINE

## 2023-07-07 PROCEDURE — 2580000003 HC RX 258: Performed by: INTERNAL MEDICINE

## 2023-07-07 PROCEDURE — 1100000000 HC RM PRIVATE

## 2023-07-07 PROCEDURE — 82077 ASSAY SPEC XCP UR&BREATH IA: CPT

## 2023-07-07 PROCEDURE — 99285 EMERGENCY DEPT VISIT HI MDM: CPT

## 2023-07-07 PROCEDURE — 96374 THER/PROPH/DIAG INJ IV PUSH: CPT

## 2023-07-07 PROCEDURE — 6360000002 HC RX W HCPCS: Performed by: EMERGENCY MEDICINE

## 2023-07-07 RX ORDER — MAGNESIUM SULFATE IN WATER 40 MG/ML
2000 INJECTION, SOLUTION INTRAVENOUS
Status: COMPLETED | OUTPATIENT
Start: 2023-07-07 | End: 2023-07-07

## 2023-07-07 RX ORDER — THIAMINE MONONITRATE (VIT B1) 100 MG
100 TABLET ORAL DAILY
COMMUNITY

## 2023-07-07 RX ORDER — POTASSIUM CHLORIDE 7.45 MG/ML
10 INJECTION INTRAVENOUS ONCE
Status: DISCONTINUED | OUTPATIENT
Start: 2023-07-07 | End: 2023-07-07 | Stop reason: SDUPTHER

## 2023-07-07 RX ORDER — POTASSIUM CHLORIDE 7.45 MG/ML
10 INJECTION INTRAVENOUS
Status: COMPLETED | OUTPATIENT
Start: 2023-07-07 | End: 2023-07-07

## 2023-07-07 RX ORDER — SODIUM CHLORIDE 0.9 % (FLUSH) 0.9 %
5-40 SYRINGE (ML) INJECTION PRN
Status: DISCONTINUED | OUTPATIENT
Start: 2023-07-07 | End: 2023-07-10 | Stop reason: HOSPADM

## 2023-07-07 RX ORDER — 0.9 % SODIUM CHLORIDE 0.9 %
1000 INTRAVENOUS SOLUTION INTRAVENOUS
Status: COMPLETED | OUTPATIENT
Start: 2023-07-07 | End: 2023-07-07

## 2023-07-07 RX ORDER — UREA 10 %
800 LOTION (ML) TOPICAL DAILY
COMMUNITY

## 2023-07-07 RX ORDER — POTASSIUM CHLORIDE 20 MEQ/1
40 TABLET, EXTENDED RELEASE ORAL PRN
Status: DISCONTINUED | OUTPATIENT
Start: 2023-07-07 | End: 2023-07-10 | Stop reason: HOSPADM

## 2023-07-07 RX ORDER — SODIUM CHLORIDE 0.9 % (FLUSH) 0.9 %
5-40 SYRINGE (ML) INJECTION EVERY 12 HOURS SCHEDULED
Status: DISCONTINUED | OUTPATIENT
Start: 2023-07-07 | End: 2023-07-10 | Stop reason: HOSPADM

## 2023-07-07 RX ORDER — POTASSIUM CHLORIDE 1.5 G/1.77G
40 POWDER, FOR SOLUTION ORAL PRN
Status: DISCONTINUED | OUTPATIENT
Start: 2023-07-07 | End: 2023-07-10 | Stop reason: HOSPADM

## 2023-07-07 RX ORDER — ONDANSETRON 2 MG/ML
4 INJECTION INTRAMUSCULAR; INTRAVENOUS EVERY 6 HOURS PRN
Status: DISCONTINUED | OUTPATIENT
Start: 2023-07-07 | End: 2023-07-10 | Stop reason: HOSPADM

## 2023-07-07 RX ORDER — GAUZE BANDAGE 2" X 2"
100 BANDAGE TOPICAL DAILY
Status: DISCONTINUED | OUTPATIENT
Start: 2023-07-07 | End: 2023-07-10 | Stop reason: HOSPADM

## 2023-07-07 RX ORDER — POTASSIUM CHLORIDE 7.45 MG/ML
10 INJECTION INTRAVENOUS
Status: DISCONTINUED | OUTPATIENT
Start: 2023-07-07 | End: 2023-07-07 | Stop reason: SDUPTHER

## 2023-07-07 RX ORDER — LORAZEPAM 2 MG/ML
1 INJECTION INTRAMUSCULAR
Status: DISCONTINUED | OUTPATIENT
Start: 2023-07-07 | End: 2023-07-10 | Stop reason: HOSPADM

## 2023-07-07 RX ORDER — POTASSIUM CHLORIDE 7.45 MG/ML
10 INJECTION INTRAVENOUS PRN
Status: DISCONTINUED | OUTPATIENT
Start: 2023-07-07 | End: 2023-07-10 | Stop reason: HOSPADM

## 2023-07-07 RX ORDER — ONDANSETRON 2 MG/ML
4 INJECTION INTRAMUSCULAR; INTRAVENOUS ONCE
Status: COMPLETED | OUTPATIENT
Start: 2023-07-07 | End: 2023-07-07

## 2023-07-07 RX ORDER — SODIUM CHLORIDE 9 MG/ML
INJECTION, SOLUTION INTRAVENOUS CONTINUOUS
Status: DISCONTINUED | OUTPATIENT
Start: 2023-07-07 | End: 2023-07-10

## 2023-07-07 RX ORDER — ACETAMINOPHEN 650 MG/1
650 SUPPOSITORY RECTAL EVERY 6 HOURS PRN
Status: DISCONTINUED | OUTPATIENT
Start: 2023-07-07 | End: 2023-07-10 | Stop reason: HOSPADM

## 2023-07-07 RX ORDER — LORAZEPAM 2 MG/ML
4 INJECTION INTRAMUSCULAR
Status: DISCONTINUED | OUTPATIENT
Start: 2023-07-07 | End: 2023-07-10 | Stop reason: HOSPADM

## 2023-07-07 RX ORDER — ONDANSETRON 4 MG/1
4 TABLET, ORALLY DISINTEGRATING ORAL EVERY 8 HOURS PRN
Status: DISCONTINUED | OUTPATIENT
Start: 2023-07-07 | End: 2023-07-10 | Stop reason: HOSPADM

## 2023-07-07 RX ORDER — LORAZEPAM 2 MG/ML
3 INJECTION INTRAMUSCULAR
Status: DISCONTINUED | OUTPATIENT
Start: 2023-07-07 | End: 2023-07-10 | Stop reason: HOSPADM

## 2023-07-07 RX ORDER — LORAZEPAM 1 MG/1
2 TABLET ORAL
Status: DISCONTINUED | OUTPATIENT
Start: 2023-07-07 | End: 2023-07-10 | Stop reason: HOSPADM

## 2023-07-07 RX ORDER — ENOXAPARIN SODIUM 100 MG/ML
40 INJECTION SUBCUTANEOUS DAILY
Status: DISCONTINUED | OUTPATIENT
Start: 2023-07-07 | End: 2023-07-10 | Stop reason: HOSPADM

## 2023-07-07 RX ORDER — LORAZEPAM 2 MG/ML
2 INJECTION INTRAMUSCULAR
Status: DISCONTINUED | OUTPATIENT
Start: 2023-07-07 | End: 2023-07-10 | Stop reason: HOSPADM

## 2023-07-07 RX ORDER — LORAZEPAM 1 MG/1
3 TABLET ORAL
Status: DISCONTINUED | OUTPATIENT
Start: 2023-07-07 | End: 2023-07-10 | Stop reason: HOSPADM

## 2023-07-07 RX ORDER — PANTOPRAZOLE SODIUM 40 MG/1
40 TABLET, DELAYED RELEASE ORAL
Status: DISCONTINUED | OUTPATIENT
Start: 2023-07-07 | End: 2023-07-10 | Stop reason: HOSPADM

## 2023-07-07 RX ORDER — ACETAMINOPHEN 325 MG/1
650 TABLET ORAL EVERY 6 HOURS PRN
Status: DISCONTINUED | OUTPATIENT
Start: 2023-07-07 | End: 2023-07-10 | Stop reason: HOSPADM

## 2023-07-07 RX ORDER — LORAZEPAM 1 MG/1
4 TABLET ORAL
Status: DISCONTINUED | OUTPATIENT
Start: 2023-07-07 | End: 2023-07-10 | Stop reason: HOSPADM

## 2023-07-07 RX ORDER — PANTOPRAZOLE SODIUM 40 MG/1
40 TABLET, DELAYED RELEASE ORAL DAILY
COMMUNITY

## 2023-07-07 RX ORDER — POLYETHYLENE GLYCOL 3350 17 G/17G
17 POWDER, FOR SOLUTION ORAL DAILY PRN
Status: DISCONTINUED | OUTPATIENT
Start: 2023-07-07 | End: 2023-07-10 | Stop reason: HOSPADM

## 2023-07-07 RX ORDER — LORAZEPAM 1 MG/1
1 TABLET ORAL
Status: DISCONTINUED | OUTPATIENT
Start: 2023-07-07 | End: 2023-07-10 | Stop reason: HOSPADM

## 2023-07-07 RX ORDER — POTASSIUM CHLORIDE 750 MG/1
40 TABLET, FILM COATED, EXTENDED RELEASE ORAL ONCE
Status: COMPLETED | OUTPATIENT
Start: 2023-07-07 | End: 2023-07-07

## 2023-07-07 RX ORDER — SODIUM CHLORIDE 9 MG/ML
INJECTION, SOLUTION INTRAVENOUS PRN
Status: DISCONTINUED | OUTPATIENT
Start: 2023-07-07 | End: 2023-07-10 | Stop reason: HOSPADM

## 2023-07-07 RX ADMIN — POTASSIUM CHLORIDE 10 MEQ: 7.46 INJECTION, SOLUTION INTRAVENOUS at 17:21

## 2023-07-07 RX ADMIN — POTASSIUM CHLORIDE 10 MEQ: 7.46 INJECTION, SOLUTION INTRAVENOUS at 18:19

## 2023-07-07 RX ADMIN — LORAZEPAM 1 MG: 2 INJECTION INTRAMUSCULAR; INTRAVENOUS at 18:11

## 2023-07-07 RX ADMIN — SODIUM CHLORIDE: 9 INJECTION, SOLUTION INTRAVENOUS at 16:07

## 2023-07-07 RX ADMIN — PANTOPRAZOLE SODIUM 40 MG: 40 TABLET, DELAYED RELEASE ORAL at 17:20

## 2023-07-07 RX ADMIN — SODIUM CHLORIDE 1000 ML: 9 INJECTION, SOLUTION INTRAVENOUS at 11:03

## 2023-07-07 RX ADMIN — POTASSIUM CHLORIDE 40 MEQ: 750 TABLET, EXTENDED RELEASE ORAL at 14:04

## 2023-07-07 RX ADMIN — ONDANSETRON 4 MG: 2 INJECTION INTRAMUSCULAR; INTRAVENOUS at 11:02

## 2023-07-07 RX ADMIN — MAGNESIUM SULFATE HEPTAHYDRATE 2000 MG: 40 INJECTION, SOLUTION INTRAVENOUS at 14:00

## 2023-07-07 RX ADMIN — ENOXAPARIN SODIUM 40 MG: 100 INJECTION SUBCUTANEOUS at 17:21

## 2023-07-07 RX ADMIN — POTASSIUM CHLORIDE 10 MEQ: 7.46 INJECTION, SOLUTION INTRAVENOUS at 16:06

## 2023-07-07 RX ADMIN — SODIUM CHLORIDE, PRESERVATIVE FREE 10 ML: 5 INJECTION INTRAVENOUS at 21:43

## 2023-07-07 ASSESSMENT — PAIN - FUNCTIONAL ASSESSMENT
PAIN_FUNCTIONAL_ASSESSMENT: NONE - DENIES PAIN
PAIN_FUNCTIONAL_ASSESSMENT: NONE - DENIES PAIN

## 2023-07-07 ASSESSMENT — LIFESTYLE VARIABLES
HOW MANY STANDARD DRINKS CONTAINING ALCOHOL DO YOU HAVE ON A TYPICAL DAY: 1 OR 2
HOW OFTEN DO YOU HAVE A DRINK CONTAINING ALCOHOL: MONTHLY OR LESS

## 2023-07-07 ASSESSMENT — PAIN SCALES - GENERAL
PAINLEVEL_OUTOF10: 0
PAINLEVEL_OUTOF10: 0

## 2023-07-08 LAB
ALBUMIN SERPL-MCNC: 2.5 G/DL (ref 3.5–5)
ALBUMIN/GLOB SERPL: 1 (ref 1.1–2.2)
ALP SERPL-CCNC: 108 U/L (ref 45–117)
ALT SERPL-CCNC: 21 U/L (ref 12–78)
ANION GAP SERPL CALC-SCNC: 7 MMOL/L (ref 5–15)
ANION GAP SERPL CALC-SCNC: 8 MMOL/L (ref 5–15)
AST SERPL W P-5'-P-CCNC: 57 U/L (ref 15–37)
BASOPHILS # BLD: 0 K/UL (ref 0–0.1)
BASOPHILS NFR BLD: 0 % (ref 0–1)
BILIRUB SERPL-MCNC: 0.4 MG/DL (ref 0.2–1)
BUN SERPL-MCNC: 7 MG/DL (ref 6–20)
BUN SERPL-MCNC: 8 MG/DL (ref 6–20)
BUN/CREAT SERPL: 10 (ref 12–20)
BUN/CREAT SERPL: 17 (ref 12–20)
CA-I BLD-MCNC: 7.8 MG/DL (ref 8.5–10.1)
CA-I BLD-MCNC: 8.3 MG/DL (ref 8.5–10.1)
CHLORIDE SERPL-SCNC: 105 MMOL/L (ref 97–108)
CHLORIDE SERPL-SCNC: 105 MMOL/L (ref 97–108)
CO2 SERPL-SCNC: 27 MMOL/L (ref 21–32)
CO2 SERPL-SCNC: 27 MMOL/L (ref 21–32)
CREAT SERPL-MCNC: 0.46 MG/DL (ref 0.7–1.3)
CREAT SERPL-MCNC: 0.7 MG/DL (ref 0.7–1.3)
DIFFERENTIAL METHOD BLD: NORMAL
EOSINOPHIL # BLD: 0 K/UL (ref 0–0.4)
EOSINOPHIL NFR BLD: 0 % (ref 0–7)
ERYTHROCYTE [DISTWIDTH] IN BLOOD BY AUTOMATED COUNT: 20.2 % (ref 11.5–14.5)
GLOBULIN SER CALC-MCNC: 2.6 G/DL (ref 2–4)
GLUCOSE SERPL-MCNC: 100 MG/DL (ref 65–100)
GLUCOSE SERPL-MCNC: 104 MG/DL (ref 65–100)
HCT VFR BLD AUTO: 28.9 % (ref 36.6–50.3)
HGB BLD-MCNC: 10 G/DL (ref 12.1–17)
IMM GRANULOCYTES # BLD AUTO: 0.1 K/UL
IMM GRANULOCYTES NFR BLD AUTO: 1 %
LIPASE SERPL-CCNC: 338 U/L (ref 73–393)
LYMPHOCYTES # BLD: 1.6 K/UL (ref 0.8–3.5)
LYMPHOCYTES NFR BLD: 23 % (ref 12–49)
MAGNESIUM SERPL-MCNC: 1.4 MG/DL (ref 1.6–2.4)
MAGNESIUM SERPL-MCNC: 1.9 MG/DL (ref 1.6–2.4)
MCH RBC QN AUTO: 36.9 PG (ref 26–34)
MCHC RBC AUTO-ENTMCNC: 34.6 G/DL (ref 30–36.5)
MCV RBC AUTO: 106.6 FL (ref 80–99)
MONOCYTES # BLD: 0.4 K/UL (ref 0–1)
MONOCYTES NFR BLD: 6 % (ref 5–13)
NEUTS SEG # BLD: 4.7 K/UL (ref 1.8–8)
NEUTS SEG NFR BLD: 70 % (ref 32–75)
NRBC # BLD: 0 K/UL (ref 0–0.01)
NRBC BLD-RTO: 0 PER 100 WBC
PHOSPHATE SERPL-MCNC: 2.1 MG/DL (ref 2.6–4.7)
PLATELET # BLD AUTO: 224 K/UL (ref 150–400)
PMV BLD AUTO: 9.9 FL (ref 8.9–12.9)
POTASSIUM SERPL-SCNC: 2.3 MMOL/L (ref 3.5–5.1)
POTASSIUM SERPL-SCNC: 2.6 MMOL/L (ref 3.5–5.1)
PROCALCITONIN SERPL-MCNC: 0.24 NG/ML
PROT SERPL-MCNC: 5.1 G/DL (ref 6.4–8.2)
RBC # BLD AUTO: 2.71 M/UL (ref 4.1–5.7)
RBC MORPH BLD: NORMAL
SODIUM SERPL-SCNC: 139 MMOL/L (ref 136–145)
SODIUM SERPL-SCNC: 140 MMOL/L (ref 136–145)
WBC # BLD AUTO: 6.8 K/UL (ref 4.1–11.1)

## 2023-07-08 PROCEDURE — 6360000002 HC RX W HCPCS: Performed by: HOSPITALIST

## 2023-07-08 PROCEDURE — 84100 ASSAY OF PHOSPHORUS: CPT

## 2023-07-08 PROCEDURE — 6370000000 HC RX 637 (ALT 250 FOR IP): Performed by: INTERNAL MEDICINE

## 2023-07-08 PROCEDURE — 2580000003 HC RX 258: Performed by: INTERNAL MEDICINE

## 2023-07-08 PROCEDURE — 97161 PT EVAL LOW COMPLEX 20 MIN: CPT

## 2023-07-08 PROCEDURE — 80048 BASIC METABOLIC PNL TOTAL CA: CPT

## 2023-07-08 PROCEDURE — 84145 PROCALCITONIN (PCT): CPT

## 2023-07-08 PROCEDURE — 80053 COMPREHEN METABOLIC PANEL: CPT

## 2023-07-08 PROCEDURE — 97530 THERAPEUTIC ACTIVITIES: CPT

## 2023-07-08 PROCEDURE — 1100000000 HC RM PRIVATE

## 2023-07-08 PROCEDURE — 85027 COMPLETE CBC AUTOMATED: CPT

## 2023-07-08 PROCEDURE — 6360000002 HC RX W HCPCS: Performed by: INTERNAL MEDICINE

## 2023-07-08 PROCEDURE — 83735 ASSAY OF MAGNESIUM: CPT

## 2023-07-08 PROCEDURE — 36415 COLL VENOUS BLD VENIPUNCTURE: CPT

## 2023-07-08 PROCEDURE — 83690 ASSAY OF LIPASE: CPT

## 2023-07-08 RX ORDER — MAGNESIUM SULFATE IN WATER 40 MG/ML
2000 INJECTION, SOLUTION INTRAVENOUS PRN
Status: DISCONTINUED | OUTPATIENT
Start: 2023-07-08 | End: 2023-07-10 | Stop reason: HOSPADM

## 2023-07-08 RX ORDER — MAGNESIUM SULFATE 1 G/100ML
1000 INJECTION INTRAVENOUS ONCE
Status: COMPLETED | OUTPATIENT
Start: 2023-07-08 | End: 2023-07-08

## 2023-07-08 RX ORDER — MIDODRINE HYDROCHLORIDE 5 MG/1
2.5 TABLET ORAL
Status: DISCONTINUED | OUTPATIENT
Start: 2023-07-08 | End: 2023-07-09

## 2023-07-08 RX ADMIN — POTASSIUM CHLORIDE 10 MEQ: 7.46 INJECTION, SOLUTION INTRAVENOUS at 21:50

## 2023-07-08 RX ADMIN — SODIUM CHLORIDE: 9 INJECTION, SOLUTION INTRAVENOUS at 04:49

## 2023-07-08 RX ADMIN — ACETAMINOPHEN 650 MG: 325 TABLET ORAL at 04:48

## 2023-07-08 RX ADMIN — MAGNESIUM SULFATE HEPTAHYDRATE 2000 MG: 40 INJECTION, SOLUTION INTRAVENOUS at 16:29

## 2023-07-08 RX ADMIN — POTASSIUM CHLORIDE 10 MEQ: 7.46 INJECTION, SOLUTION INTRAVENOUS at 11:24

## 2023-07-08 RX ADMIN — POTASSIUM CHLORIDE 10 MEQ: 7.46 INJECTION, SOLUTION INTRAVENOUS at 12:27

## 2023-07-08 RX ADMIN — POTASSIUM CHLORIDE 10 MEQ: 7.46 INJECTION, SOLUTION INTRAVENOUS at 13:38

## 2023-07-08 RX ADMIN — SODIUM CHLORIDE, PRESERVATIVE FREE 10 ML: 5 INJECTION INTRAVENOUS at 08:15

## 2023-07-08 RX ADMIN — MAGNESIUM SULFATE HEPTAHYDRATE 1000 MG: 1 INJECTION, SOLUTION INTRAVENOUS at 22:52

## 2023-07-08 RX ADMIN — THIAMINE HCL TAB 100 MG 100 MG: 100 TAB at 08:15

## 2023-07-08 RX ADMIN — POTASSIUM CHLORIDE 10 MEQ: 7.46 INJECTION, SOLUTION INTRAVENOUS at 22:51

## 2023-07-08 RX ADMIN — SODIUM CHLORIDE, PRESERVATIVE FREE 10 ML: 5 INJECTION INTRAVENOUS at 22:19

## 2023-07-08 RX ADMIN — POTASSIUM CHLORIDE 10 MEQ: 7.46 INJECTION, SOLUTION INTRAVENOUS at 09:19

## 2023-07-08 RX ADMIN — POLYETHYLENE GLYCOL 3350 17 G: 17 POWDER, FOR SOLUTION ORAL at 04:48

## 2023-07-08 RX ADMIN — MIDODRINE HYDROCHLORIDE 2.5 MG: 5 TABLET ORAL at 12:29

## 2023-07-08 RX ADMIN — POTASSIUM CHLORIDE 10 MEQ: 7.46 INJECTION, SOLUTION INTRAVENOUS at 15:10

## 2023-07-08 RX ADMIN — ENOXAPARIN SODIUM 40 MG: 100 INJECTION SUBCUTANEOUS at 16:29

## 2023-07-08 RX ADMIN — PANTOPRAZOLE SODIUM 40 MG: 40 TABLET, DELAYED RELEASE ORAL at 16:29

## 2023-07-08 RX ADMIN — MIDODRINE HYDROCHLORIDE 2.5 MG: 5 TABLET ORAL at 16:29

## 2023-07-08 RX ADMIN — POTASSIUM CHLORIDE 10 MEQ: 7.46 INJECTION, SOLUTION INTRAVENOUS at 10:19

## 2023-07-08 RX ADMIN — PANTOPRAZOLE SODIUM 40 MG: 40 TABLET, DELAYED RELEASE ORAL at 08:15

## 2023-07-08 RX ADMIN — POTASSIUM CHLORIDE 10 MEQ: 7.46 INJECTION, SOLUTION INTRAVENOUS at 23:55

## 2023-07-08 ASSESSMENT — PAIN DESCRIPTION - DESCRIPTORS: DESCRIPTORS: ACHING

## 2023-07-08 ASSESSMENT — PAIN DESCRIPTION - LOCATION: LOCATION: HEAD

## 2023-07-09 LAB
ANION GAP SERPL CALC-SCNC: 8 MMOL/L (ref 5–15)
BASOPHILS # BLD: 0 K/UL (ref 0–0.1)
BASOPHILS NFR BLD: 0 % (ref 0–1)
BUN SERPL-MCNC: 6 MG/DL (ref 6–20)
BUN/CREAT SERPL: 13 (ref 12–20)
CA-I BLD-MCNC: 8 MG/DL (ref 8.5–10.1)
CHLORIDE SERPL-SCNC: 106 MMOL/L (ref 97–108)
CO2 SERPL-SCNC: 28 MMOL/L (ref 21–32)
CREAT SERPL-MCNC: 0.48 MG/DL (ref 0.7–1.3)
DIFFERENTIAL METHOD BLD: ABNORMAL
EKG ATRIAL RATE: 134 BPM
EKG DIAGNOSIS: NORMAL
EKG P AXIS: 17 DEGREES
EKG P-R INTERVAL: 120 MS
EKG Q-T INTERVAL: 314 MS
EKG QRS DURATION: 86 MS
EKG QTC CALCULATION (BAZETT): 468 MS
EKG R AXIS: 156 DEGREES
EKG T AXIS: 25 DEGREES
EKG VENTRICULAR RATE: 134 BPM
EOSINOPHIL # BLD: 0 K/UL (ref 0–0.4)
EOSINOPHIL NFR BLD: 0 % (ref 0–7)
ERYTHROCYTE [DISTWIDTH] IN BLOOD BY AUTOMATED COUNT: 20.4 % (ref 11.5–14.5)
GLUCOSE SERPL-MCNC: 107 MG/DL (ref 65–100)
HCT VFR BLD AUTO: 25.8 % (ref 36.6–50.3)
HGB BLD-MCNC: 9 G/DL (ref 12.1–17)
IMM GRANULOCYTES # BLD AUTO: 0.1 K/UL (ref 0–0.04)
IMM GRANULOCYTES NFR BLD AUTO: 2 % (ref 0–0.5)
LYMPHOCYTES # BLD: 1.6 K/UL (ref 0.8–3.5)
LYMPHOCYTES NFR BLD: 28 % (ref 12–49)
MAGNESIUM SERPL-MCNC: 1.5 MG/DL (ref 1.6–2.4)
MCH RBC QN AUTO: 35.9 PG (ref 26–34)
MCHC RBC AUTO-ENTMCNC: 34.9 G/DL (ref 30–36.5)
MCV RBC AUTO: 102.8 FL (ref 80–99)
MONOCYTES # BLD: 0.6 K/UL (ref 0–1)
MONOCYTES NFR BLD: 11 % (ref 5–13)
NEUTS SEG # BLD: 3.6 K/UL (ref 1.8–8)
NEUTS SEG NFR BLD: 59 % (ref 32–75)
NRBC # BLD: 0.02 K/UL (ref 0–0.01)
NRBC BLD-RTO: 0.3 PER 100 WBC
PLATELET # BLD AUTO: 228 K/UL (ref 150–400)
PMV BLD AUTO: 10.1 FL (ref 8.9–12.9)
POTASSIUM SERPL-SCNC: 3 MMOL/L (ref 3.5–5.1)
RBC # BLD AUTO: 2.51 M/UL (ref 4.1–5.7)
SODIUM SERPL-SCNC: 142 MMOL/L (ref 136–145)
WBC # BLD AUTO: 6 K/UL (ref 4.1–11.1)

## 2023-07-09 PROCEDURE — 83735 ASSAY OF MAGNESIUM: CPT

## 2023-07-09 PROCEDURE — 85025 COMPLETE CBC W/AUTO DIFF WBC: CPT

## 2023-07-09 PROCEDURE — 80048 BASIC METABOLIC PNL TOTAL CA: CPT

## 2023-07-09 PROCEDURE — 6370000000 HC RX 637 (ALT 250 FOR IP): Performed by: INTERNAL MEDICINE

## 2023-07-09 PROCEDURE — 2580000003 HC RX 258: Performed by: INTERNAL MEDICINE

## 2023-07-09 PROCEDURE — 36415 COLL VENOUS BLD VENIPUNCTURE: CPT

## 2023-07-09 PROCEDURE — 6360000002 HC RX W HCPCS: Performed by: INTERNAL MEDICINE

## 2023-07-09 PROCEDURE — 1100000000 HC RM PRIVATE

## 2023-07-09 RX ORDER — MIDODRINE HYDROCHLORIDE 5 MG/1
2.5 TABLET ORAL 2 TIMES DAILY WITH MEALS
Status: DISCONTINUED | OUTPATIENT
Start: 2023-07-10 | End: 2023-07-10

## 2023-07-09 RX ADMIN — MIDODRINE HYDROCHLORIDE 2.5 MG: 5 TABLET ORAL at 13:30

## 2023-07-09 RX ADMIN — MIDODRINE HYDROCHLORIDE 2.5 MG: 5 TABLET ORAL at 09:15

## 2023-07-09 RX ADMIN — POTASSIUM CHLORIDE 10 MEQ: 7.46 INJECTION, SOLUTION INTRAVENOUS at 02:18

## 2023-07-09 RX ADMIN — POTASSIUM CHLORIDE 10 MEQ: 7.46 INJECTION, SOLUTION INTRAVENOUS at 00:56

## 2023-07-09 RX ADMIN — THIAMINE HCL TAB 100 MG 100 MG: 100 TAB at 09:15

## 2023-07-09 RX ADMIN — SODIUM CHLORIDE, PRESERVATIVE FREE 10 ML: 5 INJECTION INTRAVENOUS at 09:15

## 2023-07-09 RX ADMIN — PANTOPRAZOLE SODIUM 40 MG: 40 TABLET, DELAYED RELEASE ORAL at 18:05

## 2023-07-09 RX ADMIN — POTASSIUM CHLORIDE 10 MEQ: 7.46 INJECTION, SOLUTION INTRAVENOUS at 22:30

## 2023-07-09 RX ADMIN — POTASSIUM CHLORIDE 10 MEQ: 7.46 INJECTION, SOLUTION INTRAVENOUS at 03:36

## 2023-07-09 RX ADMIN — ENOXAPARIN SODIUM 40 MG: 100 INJECTION SUBCUTANEOUS at 18:05

## 2023-07-09 RX ADMIN — ACETAMINOPHEN 650 MG: 325 TABLET ORAL at 23:22

## 2023-07-09 RX ADMIN — POTASSIUM CHLORIDE 10 MEQ: 7.46 INJECTION, SOLUTION INTRAVENOUS at 21:11

## 2023-07-09 RX ADMIN — MAGNESIUM SULFATE HEPTAHYDRATE 2000 MG: 40 INJECTION, SOLUTION INTRAVENOUS at 19:52

## 2023-07-09 RX ADMIN — POTASSIUM CHLORIDE 10 MEQ: 7.46 INJECTION, SOLUTION INTRAVENOUS at 19:52

## 2023-07-09 RX ADMIN — SODIUM CHLORIDE: 9 INJECTION, SOLUTION INTRAVENOUS at 09:13

## 2023-07-09 RX ADMIN — PANTOPRAZOLE SODIUM 40 MG: 40 TABLET, DELAYED RELEASE ORAL at 06:46

## 2023-07-09 RX ADMIN — SODIUM CHLORIDE, PRESERVATIVE FREE 10 ML: 5 INJECTION INTRAVENOUS at 21:12

## 2023-07-09 RX ADMIN — ACETAMINOPHEN 650 MG: 325 TABLET ORAL at 02:24

## 2023-07-09 ASSESSMENT — PAIN SCALES - GENERAL
PAINLEVEL_OUTOF10: 3
PAINLEVEL_OUTOF10: 1
PAINLEVEL_OUTOF10: 3
PAINLEVEL_OUTOF10: 1

## 2023-07-09 ASSESSMENT — PAIN DESCRIPTION - ORIENTATION
ORIENTATION: LOWER
ORIENTATION: RIGHT

## 2023-07-09 ASSESSMENT — PAIN DESCRIPTION - LOCATION
LOCATION: BACK
LOCATION: ARM

## 2023-07-09 ASSESSMENT — PAIN DESCRIPTION - DESCRIPTORS
DESCRIPTORS: ACHING
DESCRIPTORS: ACHING;BURNING

## 2023-07-10 VITALS
BODY MASS INDEX: 28.69 KG/M2 | RESPIRATION RATE: 18 BRPM | WEIGHT: 182.76 LBS | HEART RATE: 104 BPM | SYSTOLIC BLOOD PRESSURE: 153 MMHG | TEMPERATURE: 97.7 F | HEIGHT: 67 IN | OXYGEN SATURATION: 100 % | DIASTOLIC BLOOD PRESSURE: 112 MMHG

## 2023-07-10 LAB
ANION GAP SERPL CALC-SCNC: 7 MMOL/L (ref 5–15)
BASOPHILS # BLD: 0 K/UL (ref 0–0.1)
BASOPHILS NFR BLD: 0 % (ref 0–1)
BUN SERPL-MCNC: 5 MG/DL (ref 6–20)
BUN/CREAT SERPL: 11 (ref 12–20)
CA-I BLD-MCNC: 8.8 MG/DL (ref 8.5–10.1)
CHLORIDE SERPL-SCNC: 103 MMOL/L (ref 97–108)
CO2 SERPL-SCNC: 30 MMOL/L (ref 21–32)
CREAT SERPL-MCNC: 0.44 MG/DL (ref 0.7–1.3)
DIFFERENTIAL METHOD BLD: ABNORMAL
EOSINOPHIL # BLD: 0 K/UL (ref 0–0.4)
EOSINOPHIL NFR BLD: 0 % (ref 0–7)
ERYTHROCYTE [DISTWIDTH] IN BLOOD BY AUTOMATED COUNT: 20.7 % (ref 11.5–14.5)
GLUCOSE SERPL-MCNC: 98 MG/DL (ref 65–100)
HCT VFR BLD AUTO: 28.3 % (ref 36.6–50.3)
HGB BLD-MCNC: 9.7 G/DL (ref 12.1–17)
IMM GRANULOCYTES # BLD AUTO: 0.1 K/UL (ref 0–0.04)
IMM GRANULOCYTES NFR BLD AUTO: 1 % (ref 0–0.5)
LYMPHOCYTES # BLD: 1.6 K/UL (ref 0.8–3.5)
LYMPHOCYTES NFR BLD: 27 % (ref 12–49)
MAGNESIUM SERPL-MCNC: 1.7 MG/DL (ref 1.6–2.4)
MAGNESIUM SERPL-MCNC: NORMAL MG/DL (ref 1.6–2.4)
MCH RBC QN AUTO: 35.9 PG (ref 26–34)
MCHC RBC AUTO-ENTMCNC: 34.3 G/DL (ref 30–36.5)
MCV RBC AUTO: 104.8 FL (ref 80–99)
MONOCYTES # BLD: 0.8 K/UL (ref 0–1)
MONOCYTES NFR BLD: 14 % (ref 5–13)
NEUTS SEG # BLD: 3.3 K/UL (ref 1.8–8)
NEUTS SEG NFR BLD: 58 % (ref 32–75)
NRBC # BLD: 0.02 K/UL (ref 0–0.01)
NRBC BLD-RTO: 0.4 PER 100 WBC
PLATELET # BLD AUTO: 269 K/UL (ref 150–400)
PMV BLD AUTO: 9.9 FL (ref 8.9–12.9)
POTASSIUM SERPL-SCNC: 3.4 MMOL/L (ref 3.5–5.1)
POTASSIUM SERPL-SCNC: ABNORMAL MMOL/L (ref 3.5–5.1)
POTASSIUM SERPL-SCNC: NORMAL MMOL/L (ref 3.5–5.1)
RBC # BLD AUTO: 2.7 M/UL (ref 4.1–5.7)
SODIUM SERPL-SCNC: 140 MMOL/L (ref 136–145)
WBC # BLD AUTO: 5.7 K/UL (ref 4.1–11.1)

## 2023-07-10 PROCEDURE — 36415 COLL VENOUS BLD VENIPUNCTURE: CPT

## 2023-07-10 PROCEDURE — 85025 COMPLETE CBC W/AUTO DIFF WBC: CPT

## 2023-07-10 PROCEDURE — 84132 ASSAY OF SERUM POTASSIUM: CPT

## 2023-07-10 PROCEDURE — 80048 BASIC METABOLIC PNL TOTAL CA: CPT

## 2023-07-10 PROCEDURE — 6370000000 HC RX 637 (ALT 250 FOR IP): Performed by: INTERNAL MEDICINE

## 2023-07-10 PROCEDURE — 2580000003 HC RX 258: Performed by: INTERNAL MEDICINE

## 2023-07-10 PROCEDURE — 83735 ASSAY OF MAGNESIUM: CPT

## 2023-07-10 RX ORDER — LANOLIN ALCOHOL/MO/W.PET/CERES
400 CREAM (GRAM) TOPICAL DAILY
Status: DISCONTINUED | OUTPATIENT
Start: 2023-07-10 | End: 2023-07-10 | Stop reason: HOSPADM

## 2023-07-10 RX ORDER — MIDODRINE HYDROCHLORIDE 5 MG/1
2.5 TABLET ORAL 2 TIMES DAILY PRN
Status: DISCONTINUED | OUTPATIENT
Start: 2023-07-10 | End: 2023-07-10 | Stop reason: HOSPADM

## 2023-07-10 RX ORDER — LANOLIN ALCOHOL/MO/W.PET/CERES
400 CREAM (GRAM) TOPICAL DAILY
Qty: 7 TABLET | Refills: 0 | Status: SHIPPED | OUTPATIENT
Start: 2023-07-10

## 2023-07-10 RX ORDER — POTASSIUM CHLORIDE 20 MEQ/1
20 TABLET, EXTENDED RELEASE ORAL DAILY
Qty: 5 TABLET | Refills: 0 | Status: SHIPPED | OUTPATIENT
Start: 2023-07-10

## 2023-07-10 RX ORDER — MIDODRINE HYDROCHLORIDE 2.5 MG/1
2.5 TABLET ORAL 2 TIMES DAILY PRN
Qty: 10 TABLET | Refills: 0 | Status: SHIPPED | OUTPATIENT
Start: 2023-07-10

## 2023-07-10 RX ORDER — POTASSIUM CHLORIDE 20 MEQ/1
40 TABLET, EXTENDED RELEASE ORAL ONCE
Status: DISCONTINUED | OUTPATIENT
Start: 2023-07-10 | End: 2023-07-10 | Stop reason: HOSPADM

## 2023-07-10 RX ADMIN — Medication 400 MG: at 16:16

## 2023-07-10 RX ADMIN — POTASSIUM CHLORIDE 40 MEQ: 1.5 FOR SOLUTION ORAL at 16:16

## 2023-07-10 RX ADMIN — MIDODRINE HYDROCHLORIDE 2.5 MG: 5 TABLET ORAL at 09:53

## 2023-07-10 RX ADMIN — THIAMINE HCL TAB 100 MG 100 MG: 100 TAB at 09:53

## 2023-07-10 RX ADMIN — SODIUM CHLORIDE, PRESERVATIVE FREE 10 ML: 5 INJECTION INTRAVENOUS at 09:54

## 2023-07-10 RX ADMIN — PANTOPRAZOLE SODIUM 40 MG: 40 TABLET, DELAYED RELEASE ORAL at 06:34

## 2023-07-10 NOTE — DISCHARGE SUMMARY
Hospitalist Discharge Summary     Patient ID:    Bernarda Edwards  [id number]  59 y.o.  1965    Admit date: 7/7/2023    Discharge date : 7/10/2023      Final Diagnoses & Acute Mgmt:    # Symptomatic Chronic Alcoholism (Vodka) with weakness, dizziness, n/v; mildly elevated alcohol level and severe hypomagnesemia and hypokalemia    => Treated CIWA protocol  => Replaced electrolytes; DC on PO K-dur, Mag-Ox for few days  => Recheck with PCP follow-up     # Secondary deconditioning d/t alcoholism     # Orthostatic Hypotension; resolved with midodrine and IVF; midodrine PRN     # Anemia; dilutional? Stable ~ 9-10     # Hx HTN, GERD    S: Feels well. Eating. Ambulatory now. Reason for Hospitalization & Hospital Course:   Bernarda Edwards is a 59 y.o. male who came to ER with complaints with weakness, unsteady, dizzy, n/v. Drinks Vodka daily. Lives with mother. 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 Denies fever, cough, CP, LOC, rash, swelling, diarrhea, HA, withdrawal symptoms such as seizure.     Discharge Medications       Medication List        START taking these medications      magnesium oxide 400 (240 Mg) MG tablet  Commonly known as: MAG-OX  Take 1 tablet by mouth daily     midodrine 2.5 MG tablet  Commonly known as: PROAMATINE  Take 1 tablet by mouth 2 times daily as needed (BP < 100)     potassium chloride 20 MEQ extended release tablet  Commonly known as: KLOR-CON M  Take 1 tablet by mouth daily            CONTINUE taking these medications      folic acid 248 MCG tablet  Commonly known as: FOLVITE     lidocaine 5 %  Commonly known as: LIDODERM     pantoprazole 40 MG tablet  Commonly known as: PROTONIX     vitamin B-1 100 MG tablet  Commonly known as: THIAMINE            STOP taking these medications      omeprazole 40 MG delayed release capsule  Commonly known as: PRILOSEC               Where to Get Your Medications        These medications were sent to [facility]

## 2023-07-10 NOTE — PROGRESS NOTES
Patient reviewed and signed discharge instructions related to new medications available at preferred pharmacy, updated medication schedule, abstaining from alcohol, new PCP information and to call for appointment within 1 week.

## 2023-07-10 NOTE — PROGRESS NOTES
Potassium back to back phlebotomy draws reported as hemolyzed, spoke to Jere in lab, she will have head phlebotomist take a look at the results.

## 2023-07-10 NOTE — PLAN OF CARE
Problem: Discharge Planning  Goal: Discharge to home or other facility with appropriate resources  Outcome: Progressing  Flowsheets (Taken 7/9/2023 6871)  Discharge to home or other facility with appropriate resources: Identify barriers to discharge with patient and caregiver     Problem: Safety - Adult  Goal: Free from fall injury  Outcome: Progressing

## 2023-11-07 ENCOUNTER — APPOINTMENT (OUTPATIENT)
Facility: HOSPITAL | Age: 58
DRG: 048 | End: 2023-11-07
Attending: STUDENT IN AN ORGANIZED HEALTH CARE EDUCATION/TRAINING PROGRAM
Payer: MEDICAID

## 2023-11-07 ENCOUNTER — HOSPITAL ENCOUNTER (INPATIENT)
Facility: HOSPITAL | Age: 58
LOS: 1 days | Discharge: LEFT BEFORE DISCHARGE | DRG: 048 | End: 2023-11-07
Attending: STUDENT IN AN ORGANIZED HEALTH CARE EDUCATION/TRAINING PROGRAM | Admitting: HOSPITALIST
Payer: MEDICAID

## 2023-11-07 ENCOUNTER — APPOINTMENT (OUTPATIENT)
Facility: HOSPITAL | Age: 58
DRG: 048 | End: 2023-11-07
Payer: MEDICAID

## 2023-11-07 VITALS
WEIGHT: 165.34 LBS | RESPIRATION RATE: 15 BRPM | OXYGEN SATURATION: 97 % | SYSTOLIC BLOOD PRESSURE: 146 MMHG | HEART RATE: 116 BPM | DIASTOLIC BLOOD PRESSURE: 88 MMHG | BODY MASS INDEX: 25.95 KG/M2 | HEIGHT: 67 IN | TEMPERATURE: 98.4 F

## 2023-11-07 DIAGNOSIS — M79.89 LEG SWELLING: Primary | ICD-10-CM

## 2023-11-07 DIAGNOSIS — F10.10 ALCOHOL ABUSE: ICD-10-CM

## 2023-11-07 PROBLEM — R55 SYNCOPE: Status: ACTIVE | Noted: 2022-06-29

## 2023-11-07 PROBLEM — R55 SYNCOPE AND COLLAPSE: Status: ACTIVE | Noted: 2023-11-07

## 2023-11-07 PROBLEM — R00.0 TACHYCARDIA: Status: ACTIVE | Noted: 2022-06-27

## 2023-11-07 LAB
ALBUMIN SERPL-MCNC: 2.5 G/DL (ref 3.5–5)
ALBUMIN/GLOB SERPL: 0.6 (ref 1.1–2.2)
ALP SERPL-CCNC: 230 U/L (ref 45–117)
ALT SERPL-CCNC: 37 U/L (ref 12–78)
ANION GAP SERPL CALC-SCNC: 5 MMOL/L (ref 5–15)
AST SERPL W P-5'-P-CCNC: ABNORMAL U/L (ref 15–37)
BASOPHILS # BLD: 0 K/UL (ref 0–0.1)
BASOPHILS NFR BLD: 0 % (ref 0–1)
BILIRUB SERPL-MCNC: 0.6 MG/DL (ref 0.2–1)
BNP SERPL-MCNC: 947 PG/ML
BUN SERPL-MCNC: 7 MG/DL (ref 6–20)
BUN/CREAT SERPL: 13 (ref 12–20)
CA-I BLD-MCNC: 7.9 MG/DL (ref 8.5–10.1)
CHLORIDE SERPL-SCNC: 101 MMOL/L (ref 97–108)
CO2 SERPL-SCNC: 25 MMOL/L (ref 21–32)
CREAT SERPL-MCNC: 0.55 MG/DL (ref 0.7–1.3)
DIFFERENTIAL METHOD BLD: ABNORMAL
EOSINOPHIL # BLD: 0 K/UL (ref 0–0.4)
EOSINOPHIL NFR BLD: 0 % (ref 0–7)
ERYTHROCYTE [DISTWIDTH] IN BLOOD BY AUTOMATED COUNT: 20.8 % (ref 11.5–14.5)
GLOBULIN SER CALC-MCNC: 4.1 G/DL (ref 2–4)
GLUCOSE SERPL-MCNC: 74 MG/DL (ref 65–100)
HCT VFR BLD AUTO: 35.1 % (ref 36.6–50.3)
HGB BLD-MCNC: 12.3 G/DL (ref 12.1–17)
IMM GRANULOCYTES # BLD AUTO: 0.1 K/UL (ref 0–0.04)
IMM GRANULOCYTES NFR BLD AUTO: 1 % (ref 0–0.5)
LYMPHOCYTES # BLD: 2.5 K/UL (ref 0.8–3.5)
LYMPHOCYTES NFR BLD: 25 % (ref 12–49)
MAGNESIUM SERPL-MCNC: NORMAL MG/DL (ref 1.6–2.4)
MCH RBC QN AUTO: 34.9 PG (ref 26–34)
MCHC RBC AUTO-ENTMCNC: 35 G/DL (ref 30–36.5)
MCV RBC AUTO: 99.7 FL (ref 80–99)
MONOCYTES # BLD: 0.7 K/UL (ref 0–1)
MONOCYTES NFR BLD: 7 % (ref 5–13)
NEUTS SEG # BLD: 6.5 K/UL (ref 1.8–8)
NEUTS SEG NFR BLD: 67 % (ref 32–75)
NRBC # BLD: 0 K/UL (ref 0–0.01)
NRBC BLD-RTO: 0 PER 100 WBC
PLATELET # BLD AUTO: 283 K/UL (ref 150–400)
PMV BLD AUTO: 9.3 FL (ref 8.9–12.9)
POTASSIUM SERPL-SCNC: ABNORMAL MMOL/L (ref 3.5–5.1)
PROT SERPL-MCNC: 6.6 G/DL (ref 6.4–8.2)
RBC # BLD AUTO: 3.52 M/UL (ref 4.1–5.7)
SODIUM SERPL-SCNC: 131 MMOL/L (ref 136–145)
TROPONIN I SERPL HS-MCNC: 7 NG/L (ref 0–76)
WBC # BLD AUTO: 9.8 K/UL (ref 4.1–11.1)

## 2023-11-07 PROCEDURE — 99285 EMERGENCY DEPT VISIT HI MDM: CPT

## 2023-11-07 PROCEDURE — 85025 COMPLETE CBC W/AUTO DIFF WBC: CPT

## 2023-11-07 PROCEDURE — 80053 COMPREHEN METABOLIC PANEL: CPT

## 2023-11-07 PROCEDURE — 83880 ASSAY OF NATRIURETIC PEPTIDE: CPT

## 2023-11-07 PROCEDURE — 84484 ASSAY OF TROPONIN QUANT: CPT

## 2023-11-07 PROCEDURE — 96375 TX/PRO/DX INJ NEW DRUG ADDON: CPT

## 2023-11-07 PROCEDURE — 36415 COLL VENOUS BLD VENIPUNCTURE: CPT

## 2023-11-07 PROCEDURE — 96374 THER/PROPH/DIAG INJ IV PUSH: CPT

## 2023-11-07 PROCEDURE — 6370000000 HC RX 637 (ALT 250 FOR IP): Performed by: STUDENT IN AN ORGANIZED HEALTH CARE EDUCATION/TRAINING PROGRAM

## 2023-11-07 PROCEDURE — 93005 ELECTROCARDIOGRAM TRACING: CPT | Performed by: STUDENT IN AN ORGANIZED HEALTH CARE EDUCATION/TRAINING PROGRAM

## 2023-11-07 PROCEDURE — 83735 ASSAY OF MAGNESIUM: CPT

## 2023-11-07 PROCEDURE — 93970 EXTREMITY STUDY: CPT

## 2023-11-07 PROCEDURE — 6360000002 HC RX W HCPCS: Performed by: STUDENT IN AN ORGANIZED HEALTH CARE EDUCATION/TRAINING PROGRAM

## 2023-11-07 PROCEDURE — 1100000000 HC RM PRIVATE

## 2023-11-07 PROCEDURE — 71045 X-RAY EXAM CHEST 1 VIEW: CPT

## 2023-11-07 RX ORDER — SODIUM CHLORIDE 0.9 % (FLUSH) 0.9 %
5-40 SYRINGE (ML) INJECTION EVERY 12 HOURS SCHEDULED
Status: DISCONTINUED | OUTPATIENT
Start: 2023-11-07 | End: 2023-11-07 | Stop reason: HOSPADM

## 2023-11-07 RX ORDER — FUROSEMIDE 40 MG/1
40 TABLET ORAL DAILY
COMMUNITY
Start: 2023-10-31

## 2023-11-07 RX ORDER — ONDANSETRON 4 MG/1
4 TABLET, ORALLY DISINTEGRATING ORAL EVERY 8 HOURS PRN
Status: DISCONTINUED | OUTPATIENT
Start: 2023-11-07 | End: 2023-11-07 | Stop reason: HOSPADM

## 2023-11-07 RX ORDER — SODIUM CHLORIDE 0.9 % (FLUSH) 0.9 %
5-40 SYRINGE (ML) INJECTION PRN
Status: DISCONTINUED | OUTPATIENT
Start: 2023-11-07 | End: 2023-11-07 | Stop reason: HOSPADM

## 2023-11-07 RX ORDER — LORAZEPAM 2 MG/ML
1 INJECTION INTRAMUSCULAR ONCE
Status: DISCONTINUED | OUTPATIENT
Start: 2023-11-07 | End: 2023-11-07 | Stop reason: HOSPADM

## 2023-11-07 RX ORDER — SODIUM CHLORIDE 9 MG/ML
INJECTION, SOLUTION INTRAVENOUS PRN
Status: DISCONTINUED | OUTPATIENT
Start: 2023-11-07 | End: 2023-11-07 | Stop reason: HOSPADM

## 2023-11-07 RX ORDER — GABAPENTIN 100 MG/1
100 CAPSULE ORAL 3 TIMES DAILY
Status: DISCONTINUED | OUTPATIENT
Start: 2023-11-07 | End: 2023-11-07 | Stop reason: HOSPADM

## 2023-11-07 RX ORDER — CHLORDIAZEPOXIDE HYDROCHLORIDE 25 MG/1
25 CAPSULE, GELATIN COATED ORAL
Status: COMPLETED | OUTPATIENT
Start: 2023-11-07 | End: 2023-11-07

## 2023-11-07 RX ORDER — GAUZE BANDAGE 2" X 2"
100 BANDAGE TOPICAL DAILY
Status: DISCONTINUED | OUTPATIENT
Start: 2023-11-07 | End: 2023-11-07 | Stop reason: HOSPADM

## 2023-11-07 RX ORDER — POTASSIUM CHLORIDE 20 MEQ/1
20 TABLET, EXTENDED RELEASE ORAL DAILY
Status: DISCONTINUED | OUTPATIENT
Start: 2023-11-07 | End: 2023-11-07 | Stop reason: HOSPADM

## 2023-11-07 RX ORDER — FUROSEMIDE 10 MG/ML
40 INJECTION INTRAMUSCULAR; INTRAVENOUS
Status: COMPLETED | OUTPATIENT
Start: 2023-11-07 | End: 2023-11-07

## 2023-11-07 RX ORDER — FUROSEMIDE 40 MG/1
40 TABLET ORAL DAILY
Status: DISCONTINUED | OUTPATIENT
Start: 2023-11-07 | End: 2023-11-07

## 2023-11-07 RX ORDER — ENOXAPARIN SODIUM 100 MG/ML
40 INJECTION SUBCUTANEOUS DAILY
Status: DISCONTINUED | OUTPATIENT
Start: 2023-11-07 | End: 2023-11-07 | Stop reason: HOSPADM

## 2023-11-07 RX ORDER — PANTOPRAZOLE SODIUM 40 MG/1
40 TABLET, DELAYED RELEASE ORAL DAILY
Status: DISCONTINUED | OUTPATIENT
Start: 2023-11-07 | End: 2023-11-07 | Stop reason: HOSPADM

## 2023-11-07 RX ORDER — LANOLIN ALCOHOL/MO/W.PET/CERES
400 CREAM (GRAM) TOPICAL DAILY
Status: DISCONTINUED | OUTPATIENT
Start: 2023-11-07 | End: 2023-11-07 | Stop reason: HOSPADM

## 2023-11-07 RX ORDER — ONDANSETRON 2 MG/ML
4 INJECTION INTRAMUSCULAR; INTRAVENOUS EVERY 6 HOURS PRN
Status: DISCONTINUED | OUTPATIENT
Start: 2023-11-07 | End: 2023-11-07 | Stop reason: HOSPADM

## 2023-11-07 RX ORDER — LORAZEPAM 2 MG/ML
1 INJECTION INTRAMUSCULAR ONCE
Status: COMPLETED | OUTPATIENT
Start: 2023-11-07 | End: 2023-11-07

## 2023-11-07 RX ORDER — MORPHINE SULFATE 2 MG/ML
2 INJECTION, SOLUTION INTRAMUSCULAR; INTRAVENOUS
Status: COMPLETED | OUTPATIENT
Start: 2023-11-07 | End: 2023-11-07

## 2023-11-07 RX ORDER — ACETAMINOPHEN 650 MG/1
650 SUPPOSITORY RECTAL EVERY 6 HOURS PRN
Status: DISCONTINUED | OUTPATIENT
Start: 2023-11-07 | End: 2023-11-07 | Stop reason: HOSPADM

## 2023-11-07 RX ORDER — ACETAMINOPHEN 325 MG/1
650 TABLET ORAL EVERY 6 HOURS PRN
Status: DISCONTINUED | OUTPATIENT
Start: 2023-11-07 | End: 2023-11-07 | Stop reason: HOSPADM

## 2023-11-07 RX ORDER — GABAPENTIN 100 MG/1
100 CAPSULE ORAL 3 TIMES DAILY
COMMUNITY
Start: 2023-10-04

## 2023-11-07 RX ADMIN — CHLORDIAZEPOXIDE HYDROCHLORIDE 25 MG: 25 CAPSULE ORAL at 17:08

## 2023-11-07 RX ADMIN — FUROSEMIDE 40 MG: 10 INJECTION, SOLUTION INTRAMUSCULAR; INTRAVENOUS at 17:06

## 2023-11-07 RX ADMIN — LORAZEPAM 1 MG: 2 INJECTION INTRAMUSCULAR; INTRAVENOUS at 19:53

## 2023-11-07 RX ADMIN — MORPHINE SULFATE 2 MG: 2 INJECTION, SOLUTION INTRAMUSCULAR; INTRAVENOUS at 14:33

## 2023-11-07 ASSESSMENT — LIFESTYLE VARIABLES
HOW MANY STANDARD DRINKS CONTAINING ALCOHOL DO YOU HAVE ON A TYPICAL DAY: 1 OR 2
HOW OFTEN DO YOU HAVE A DRINK CONTAINING ALCOHOL: 4 OR MORE TIMES A WEEK

## 2023-11-07 ASSESSMENT — PAIN - FUNCTIONAL ASSESSMENT: PAIN_FUNCTIONAL_ASSESSMENT: 0-10

## 2023-11-07 ASSESSMENT — PAIN DESCRIPTION - ORIENTATION: ORIENTATION: RIGHT;LEFT

## 2023-11-07 ASSESSMENT — PAIN SCALES - GENERAL
PAINLEVEL_OUTOF10: 10
PAINLEVEL_OUTOF10: 10
PAINLEVEL_OUTOF10: 6

## 2023-11-07 ASSESSMENT — PAIN DESCRIPTION - LOCATION: LOCATION: LEG

## 2023-11-07 NOTE — ED TRIAGE NOTES
Pt c/o bilateral lower leg swelling and foot swelling from neuropathy; unable to see PCP for pain, current medications not working

## 2023-11-07 NOTE — ED PROVIDER NOTES
[facility] EMERGENCY DEPT  EMERGENCY DEPARTMENT HISTORY AND PHYSICAL EXAM      Date: 11/7/2023  Patient Name: Lebron Dutton  MRN: [id number]  YOB: 1965  Date of evaluation: 11/7/2023  Provider: Lottie Lewis MD   Note Started: 2:51 PM EST 11/7/23    HISTORY OF PRESENT ILLNESS     Chief Complaint   Patient presents with    Peripheral Neuropathy    Leg Swelling       History Provided By: Patient    HPI: Lebron Dutton is a 59 y.o. male presents to the emergency department for evaluation of lower extremity pain and swelling. Patient states over the last several weeks he has noticed worsening swelling, additionally has complained of pain to his legs, states he has been diagnosed with neuropathy takes gabapentin which states does not alleviate pain. Patient denies any fevers chills, no chest pain shortness of breath. Patient states he has never been worked up fully for his lower extremity swelling. No known history of DVTs. No known history of cardiac failure. PAST MEDICAL HISTORY   Past Medical History:  Past Medical History:   Diagnosis Date    Gastrointestinal disorder     Hypertension        Past Surgical History:  History reviewed. No pertinent surgical history. Family History:  History reviewed. No pertinent family history. Social History:  Social History     Tobacco Use    Smoking status: Every Day     Packs/day: .5     Types: Cigarettes    Smokeless tobacco: Never   Substance Use Topics    Alcohol use: Yes    Drug use: Never       Allergies:  No Known Allergies    PCP: No primary care provider on file. Current Meds:   No current facility-administered medications for this encounter. Current Outpatient Medications   Medication Sig Dispense Refill    gabapentin (NEURONTIN) 100 MG capsule Take 1 capsule by mouth 3 times daily.  Max Daily Amount: 300 mg      furosemide (LASIX) 40 MG tablet Take 1 tablet by mouth daily      midodrine (PROAMATINE) 2.5 MG tablet Take 1 tablet by mouth 2

## 2023-11-08 LAB — ECHO BSA: 1.88 M2

## 2023-11-08 NOTE — ED NOTES
Pt refusing to be admitted, MD aware. Risks discussed with patient. AMA paper completed, IV removed.              Alexandr العراقي RN  11/07/23 8994

## 2023-11-08 NOTE — ED NOTES
Assumed care of patient. Bedside shift report received from [place], [place] at this time. Pt resting quietly at this time without complaint.       Pankaj Rodriguez RN  11/07/23 2030

## 2023-11-09 LAB
EKG ATRIAL RATE: 109 BPM
EKG DIAGNOSIS: NORMAL
EKG P AXIS: 30 DEGREES
EKG P-R INTERVAL: 146 MS
EKG Q-T INTERVAL: 360 MS
EKG QRS DURATION: 74 MS
EKG QTC CALCULATION (BAZETT): 484 MS
EKG R AXIS: -26 DEGREES
EKG T AXIS: 21 DEGREES
EKG VENTRICULAR RATE: 109 BPM

## 2023-11-10 ENCOUNTER — HOSPITAL ENCOUNTER (EMERGENCY)
Facility: HOSPITAL | Age: 58
Discharge: HOME | End: 2023-11-10
Attending: EMERGENCY MEDICINE
Payer: MEDICAID

## 2023-11-10 VITALS
SYSTOLIC BLOOD PRESSURE: 124 MMHG | OXYGEN SATURATION: 100 % | WEIGHT: 165 LBS | RESPIRATION RATE: 19 BRPM | HEART RATE: 120 BPM | DIASTOLIC BLOOD PRESSURE: 68 MMHG | TEMPERATURE: 98.1 F | HEIGHT: 67 IN | BODY MASS INDEX: 25.9 KG/M2

## 2023-11-10 DIAGNOSIS — L03.119 CELLULITIS OF LOWER EXTREMITY, UNSPECIFIED LATERALITY: Primary | ICD-10-CM

## 2023-11-10 DIAGNOSIS — I89.0 LYMPHEDEMA: ICD-10-CM

## 2023-11-10 LAB
ALBUMIN SERPL-MCNC: 2.4 G/DL (ref 3.5–5)
ALBUMIN/GLOB SERPL: 0.7 (ref 1.1–2.2)
ALP SERPL-CCNC: 231 U/L (ref 45–117)
ALT SERPL-CCNC: 20 U/L (ref 12–78)
ANION GAP SERPL CALC-SCNC: 9 MMOL/L (ref 5–15)
AST SERPL W P-5'-P-CCNC: 72 U/L (ref 15–37)
BASOPHILS # BLD: 0 K/UL (ref 0–0.1)
BASOPHILS NFR BLD: 0 % (ref 0–1)
BILIRUB SERPL-MCNC: 0.8 MG/DL (ref 0.2–1)
BNP SERPL-MCNC: 307 PG/ML
BUN SERPL-MCNC: 7 MG/DL (ref 6–20)
BUN/CREAT SERPL: 17 (ref 12–20)
CA-I BLD-MCNC: 7.6 MG/DL (ref 8.5–10.1)
CHLORIDE SERPL-SCNC: 107 MMOL/L (ref 97–108)
CO2 SERPL-SCNC: 25 MMOL/L (ref 21–32)
CREAT SERPL-MCNC: 0.41 MG/DL (ref 0.7–1.3)
DIFFERENTIAL METHOD BLD: ABNORMAL
EOSINOPHIL # BLD: 0 K/UL (ref 0–0.4)
EOSINOPHIL NFR BLD: 0 % (ref 0–7)
ERYTHROCYTE [DISTWIDTH] IN BLOOD BY AUTOMATED COUNT: 19.7 % (ref 11.5–14.5)
GLOBULIN SER CALC-MCNC: 3.3 G/DL (ref 2–4)
GLUCOSE SERPL-MCNC: 81 MG/DL (ref 65–100)
HCT VFR BLD AUTO: 30.6 % (ref 36.6–50.3)
HGB BLD-MCNC: 10.5 G/DL (ref 12.1–17)
IMM GRANULOCYTES # BLD AUTO: 0.1 K/UL (ref 0–0.04)
IMM GRANULOCYTES NFR BLD AUTO: 1 % (ref 0–0.5)
LYMPHOCYTES # BLD: 2 K/UL (ref 0.8–3.5)
LYMPHOCYTES NFR BLD: 21 % (ref 12–49)
MCH RBC QN AUTO: 34.8 PG (ref 26–34)
MCHC RBC AUTO-ENTMCNC: 34.3 G/DL (ref 30–36.5)
MCV RBC AUTO: 101.3 FL (ref 80–99)
MONOCYTES # BLD: 0.7 K/UL (ref 0–1)
MONOCYTES NFR BLD: 8 % (ref 5–13)
NEUTS SEG # BLD: 6.5 K/UL (ref 1.8–8)
NEUTS SEG NFR BLD: 70 % (ref 32–75)
NRBC # BLD: 0 K/UL (ref 0–0.01)
NRBC BLD-RTO: 0 PER 100 WBC
PLATELET # BLD AUTO: 290 K/UL (ref 150–400)
PMV BLD AUTO: 8.7 FL (ref 8.9–12.9)
POTASSIUM SERPL-SCNC: 4.3 MMOL/L (ref 3.5–5.1)
PROT SERPL-MCNC: 5.7 G/DL (ref 6.4–8.2)
RBC # BLD AUTO: 3.02 M/UL (ref 4.1–5.7)
SODIUM SERPL-SCNC: 141 MMOL/L (ref 136–145)
WBC # BLD AUTO: 9.4 K/UL (ref 4.1–11.1)

## 2023-11-10 PROCEDURE — 2580000003 HC RX 258: Performed by: EMERGENCY MEDICINE

## 2023-11-10 PROCEDURE — 96365 THER/PROPH/DIAG IV INF INIT: CPT

## 2023-11-10 PROCEDURE — 85025 COMPLETE CBC W/AUTO DIFF WBC: CPT

## 2023-11-10 PROCEDURE — 96375 TX/PRO/DX INJ NEW DRUG ADDON: CPT

## 2023-11-10 PROCEDURE — 99284 EMERGENCY DEPT VISIT MOD MDM: CPT

## 2023-11-10 PROCEDURE — 80053 COMPREHEN METABOLIC PANEL: CPT

## 2023-11-10 PROCEDURE — 83880 ASSAY OF NATRIURETIC PEPTIDE: CPT

## 2023-11-10 PROCEDURE — 36415 COLL VENOUS BLD VENIPUNCTURE: CPT

## 2023-11-10 PROCEDURE — 6360000002 HC RX W HCPCS: Performed by: EMERGENCY MEDICINE

## 2023-11-10 RX ORDER — KETOROLAC TROMETHAMINE 15 MG/ML
15 INJECTION, SOLUTION INTRAMUSCULAR; INTRAVENOUS
Status: COMPLETED | OUTPATIENT
Start: 2023-11-10 | End: 2023-11-10

## 2023-11-10 RX ORDER — DOXYCYCLINE HYCLATE 100 MG
100 TABLET ORAL 2 TIMES DAILY
Qty: 14 TABLET | Refills: 0 | Status: SHIPPED | OUTPATIENT
Start: 2023-11-10 | End: 2023-11-17

## 2023-11-10 RX ORDER — FUROSEMIDE 10 MG/ML
60 INJECTION INTRAMUSCULAR; INTRAVENOUS ONCE
Status: COMPLETED | OUTPATIENT
Start: 2023-11-10 | End: 2023-11-10

## 2023-11-10 RX ORDER — CEPHALEXIN 500 MG/1
500 CAPSULE ORAL 3 TIMES DAILY
Qty: 21 CAPSULE | Refills: 0 | Status: SHIPPED | OUTPATIENT
Start: 2023-11-10 | End: 2023-11-17

## 2023-11-10 RX ORDER — NAPROXEN 500 MG/1
500 TABLET ORAL 2 TIMES DAILY WITH MEALS
Qty: 20 TABLET | Refills: 0 | Status: SHIPPED | OUTPATIENT
Start: 2023-11-10

## 2023-11-10 RX ADMIN — AMPICILLIN SODIUM AND SULBACTAM SODIUM 3000 MG: 2; 1 INJECTION, POWDER, FOR SOLUTION INTRAMUSCULAR; INTRAVENOUS at 19:55

## 2023-11-10 RX ADMIN — FUROSEMIDE 40 MG: 10 INJECTION, SOLUTION INTRAMUSCULAR; INTRAVENOUS at 19:55

## 2023-11-10 RX ADMIN — KETOROLAC TROMETHAMINE 15 MG: 15 INJECTION, SOLUTION INTRAMUSCULAR; INTRAVENOUS at 21:05

## 2023-11-10 RX ADMIN — CEFTRIAXONE SODIUM 2000 MG: 2 INJECTION, POWDER, FOR SOLUTION INTRAMUSCULAR; INTRAVENOUS at 19:55

## 2023-11-10 ASSESSMENT — PAIN - FUNCTIONAL ASSESSMENT
PAIN_FUNCTIONAL_ASSESSMENT: 0-10
PAIN_FUNCTIONAL_ASSESSMENT: 0-10

## 2023-11-10 ASSESSMENT — PAIN SCALES - GENERAL
PAINLEVEL_OUTOF10: 10

## 2023-11-10 ASSESSMENT — PAIN DESCRIPTION - ORIENTATION: ORIENTATION: RIGHT;LEFT

## 2023-11-10 NOTE — ED PROVIDER NOTES
K/UL    Lymphocytes Absolute 2.0 0.8 - 3.5 K/UL    Monocytes Absolute 0.7 0.0 - 1.0 K/UL    Eosinophils Absolute 0.0 0.0 - 0.4 K/UL    Basophils Absolute 0.0 0.0 - 0.1 K/UL    Absolute Immature Granulocyte 0.1 (H) 0.00 - 0.04 K/UL    Differential Type AUTOMATED     Comprehensive Metabolic Panel    Collection Time: 11/10/23  6:44 PM   Result Value Ref Range    Sodium 141 136 - 145 mmol/L    Potassium 4.3 3.5 - 5.1 mmol/L    Chloride 107 97 - 108 mmol/L    CO2 25 21 - 32 mmol/L    Anion Gap 9 5 - 15 mmol/L    Glucose 81 65 - 100 mg/dL    BUN 7 6 - 20 mg/dL    Creatinine 0.41 (L) 0.70 - 1.30 mg/dL    Bun/Cre Ratio 17 12 - 20      Est, Glom Filt Rate >60 >60 ml/min/1.73m2    Calcium 7.6 (L) 8.5 - 10.1 mg/dL    Total Bilirubin 0.8 0.2 - 1.0 mg/dL    AST 72 (H) 15 - 37 U/L    ALT 20 12 - 78 U/L    Alk Phosphatase 231 (H) 45 - 117 U/L    Total Protein 5.7 (L) 6.4 - 8.2 g/dL    Albumin 2.4 (L) 3.5 - 5.0 g/dL    Globulin 3.3 2.0 - 4.0 g/dL    Albumin/Globulin Ratio 0.7 (L) 1.1 - 2.2     Brain Natriuretic Peptide    Collection Time: 11/10/23  6:44 PM   Result Value Ref Range    NT Pro- (H) <125 pg/mL       EKG:. Not Applicable    Radiologic Studies:  Non-plain film images such as CT, Ultrasound and MRI are read by the radiologist. Plain radiographic images are visualized and preliminarily interpreted by the ED Provider with the following findings: Not Applicable. Interpretation per the Radiologist below, if available at the time of this note:  No orders to display        Osiel Torres and DIFFERENTIAL DIAGNOSIS/MDM   CC/HPI Summary, DDx, ED Course, and Reassessment: Patient presenting with worsening lymphedema as well as some erythema that could be some overlying cellulitis.   Does not appear to have any PAD on my exam.    Clinical Management Tools:  Not Applicable    Records Reviewed (source and summary of external notes): Prior medical records and Nursing notes    Vitals:    Vitals:    11/10/23 1800 [facility] [address] [phone] [address] [place] [address], [address] [place] [address]      Phone: [phone]   cephALEXin 500 MG capsule  doxycycline hyclate 100 MG tablet  naproxen 500 MG tablet           DISCONTINUED MEDICATIONS:  Current Discharge Medication List          I am the Primary Clinician of Record. Fletcher Aguero MD (electronically signed)    (Please note that parts of this dictation were completed with voice recognition software. Quite often unanticipated grammatical, syntax, homophones, and other interpretive errors are inadvertently transcribed by the computer software. Please disregards these errors.  Please excuse any errors that have escaped final proofreading.)     Fletcher Aguero MD  11/10/23 1946

## 2023-11-11 NOTE — DISCHARGE INSTRUCTIONS
Please make sure you take your Lasix over the next couple of days to help with the fluid in your legs and the weeping. It will only get better if you take the fluid pill. Please start the 2 antibiotics to cover for the typical skin bacteria that causes infection of the skin. Follow-up with your primary care doctor.

## 2023-11-14 ENCOUNTER — APPOINTMENT (OUTPATIENT)
Facility: HOSPITAL | Age: 58
End: 2023-11-14
Payer: MEDICAID

## 2023-11-14 ENCOUNTER — HOSPITAL ENCOUNTER (EMERGENCY)
Facility: HOSPITAL | Age: 58
Discharge: HOME | End: 2023-11-14
Attending: EMERGENCY MEDICINE
Payer: MEDICAID

## 2023-11-14 VITALS
OXYGEN SATURATION: 100 % | DIASTOLIC BLOOD PRESSURE: 60 MMHG | TEMPERATURE: 98.2 F | HEART RATE: 78 BPM | RESPIRATION RATE: 15 BRPM | HEIGHT: 67 IN | SYSTOLIC BLOOD PRESSURE: 112 MMHG | BODY MASS INDEX: 25.9 KG/M2 | WEIGHT: 165 LBS

## 2023-11-14 DIAGNOSIS — R60.0 BILATERAL LOWER EXTREMITY EDEMA: Primary | ICD-10-CM

## 2023-11-14 LAB
ALBUMIN SERPL-MCNC: 2.5 G/DL (ref 3.5–5)
ALBUMIN/GLOB SERPL: 0.8 (ref 1.1–2.2)
ALP SERPL-CCNC: 271 U/L (ref 45–117)
ALT SERPL-CCNC: 22 U/L (ref 12–78)
ANION GAP SERPL CALC-SCNC: 10 MMOL/L (ref 5–15)
AST SERPL W P-5'-P-CCNC: 103 U/L (ref 15–37)
BASOPHILS # BLD: 0 K/UL (ref 0–0.1)
BASOPHILS NFR BLD: 0 % (ref 0–1)
BILIRUB SERPL-MCNC: 0.6 MG/DL (ref 0.2–1)
BNP SERPL-MCNC: 564 PG/ML
BUN SERPL-MCNC: 5 MG/DL (ref 6–20)
BUN/CREAT SERPL: 14 (ref 12–20)
CA-I BLD-MCNC: 8.1 MG/DL (ref 8.5–10.1)
CHLORIDE SERPL-SCNC: 103 MMOL/L (ref 97–108)
CO2 SERPL-SCNC: 25 MMOL/L (ref 21–32)
CREAT SERPL-MCNC: 0.37 MG/DL (ref 0.7–1.3)
DIFFERENTIAL METHOD BLD: ABNORMAL
EOSINOPHIL # BLD: 0 K/UL (ref 0–0.4)
EOSINOPHIL NFR BLD: 0 % (ref 0–7)
ERYTHROCYTE [DISTWIDTH] IN BLOOD BY AUTOMATED COUNT: 19.8 % (ref 11.5–14.5)
ETHANOL SERPL-MCNC: 168 MG/DL (ref 0–0.08)
GLOBULIN SER CALC-MCNC: 3.1 G/DL (ref 2–4)
GLUCOSE SERPL-MCNC: 83 MG/DL (ref 65–100)
HCT VFR BLD AUTO: 29.8 % (ref 36.6–50.3)
HGB BLD-MCNC: 10.2 G/DL (ref 12.1–17)
IMM GRANULOCYTES # BLD AUTO: 0.1 K/UL (ref 0–0.04)
IMM GRANULOCYTES NFR BLD AUTO: 2 % (ref 0–0.5)
LYMPHOCYTES # BLD: 1.6 K/UL (ref 0.8–3.5)
LYMPHOCYTES NFR BLD: 22 % (ref 12–49)
MAGNESIUM SERPL-MCNC: 1.4 MG/DL (ref 1.6–2.4)
MCH RBC QN AUTO: 35.3 PG (ref 26–34)
MCHC RBC AUTO-ENTMCNC: 34.2 G/DL (ref 30–36.5)
MCV RBC AUTO: 103.1 FL (ref 80–99)
MONOCYTES # BLD: 0.8 K/UL (ref 0–1)
MONOCYTES NFR BLD: 11 % (ref 5–13)
NEUTS SEG # BLD: 4.5 K/UL (ref 1.8–8)
NEUTS SEG NFR BLD: 65 % (ref 32–75)
NRBC # BLD: 0 K/UL (ref 0–0.01)
NRBC BLD-RTO: 0 PER 100 WBC
PLATELET # BLD AUTO: 401 K/UL (ref 150–400)
PMV BLD AUTO: 8.9 FL (ref 8.9–12.9)
POTASSIUM SERPL-SCNC: 3.4 MMOL/L (ref 3.5–5.1)
PROT SERPL-MCNC: 5.6 G/DL (ref 6.4–8.2)
RBC # BLD AUTO: 2.89 M/UL (ref 4.1–5.7)
SODIUM SERPL-SCNC: 138 MMOL/L (ref 136–145)
TROPONIN I SERPL HS-MCNC: 7 NG/L (ref 0–76)
WBC # BLD AUTO: 7 K/UL (ref 4.1–11.1)

## 2023-11-14 PROCEDURE — 82077 ASSAY SPEC XCP UR&BREATH IA: CPT

## 2023-11-14 PROCEDURE — 93005 ELECTROCARDIOGRAM TRACING: CPT

## 2023-11-14 PROCEDURE — 96374 THER/PROPH/DIAG INJ IV PUSH: CPT

## 2023-11-14 PROCEDURE — 71045 X-RAY EXAM CHEST 1 VIEW: CPT

## 2023-11-14 PROCEDURE — 84484 ASSAY OF TROPONIN QUANT: CPT

## 2023-11-14 PROCEDURE — 83735 ASSAY OF MAGNESIUM: CPT

## 2023-11-14 PROCEDURE — 36415 COLL VENOUS BLD VENIPUNCTURE: CPT

## 2023-11-14 PROCEDURE — 85025 COMPLETE CBC W/AUTO DIFF WBC: CPT

## 2023-11-14 PROCEDURE — 6360000002 HC RX W HCPCS: Performed by: EMERGENCY MEDICINE

## 2023-11-14 PROCEDURE — 80053 COMPREHEN METABOLIC PANEL: CPT

## 2023-11-14 PROCEDURE — 99285 EMERGENCY DEPT VISIT HI MDM: CPT

## 2023-11-14 PROCEDURE — 83880 ASSAY OF NATRIURETIC PEPTIDE: CPT

## 2023-11-14 RX ORDER — FUROSEMIDE 10 MG/ML
60 INJECTION INTRAMUSCULAR; INTRAVENOUS
Status: COMPLETED | OUTPATIENT
Start: 2023-11-14 | End: 2023-11-14

## 2023-11-14 RX ADMIN — FUROSEMIDE 60 MG: 10 INJECTION, SOLUTION INTRAMUSCULAR; INTRAVENOUS at 16:43

## 2023-11-14 ASSESSMENT — PAIN DESCRIPTION - ORIENTATION: ORIENTATION: RIGHT;LEFT

## 2023-11-14 ASSESSMENT — PAIN - FUNCTIONAL ASSESSMENT
PAIN_FUNCTIONAL_ASSESSMENT: 0-10
PAIN_FUNCTIONAL_ASSESSMENT: NONE - DENIES PAIN

## 2023-11-14 ASSESSMENT — LIFESTYLE VARIABLES
HOW OFTEN DO YOU HAVE A DRINK CONTAINING ALCOHOL: 4 OR MORE TIMES A WEEK
HOW MANY STANDARD DRINKS CONTAINING ALCOHOL DO YOU HAVE ON A TYPICAL DAY: 1 OR 2

## 2023-11-14 ASSESSMENT — PAIN SCALES - GENERAL: PAINLEVEL_OUTOF10: 10

## 2023-11-14 ASSESSMENT — PAIN DESCRIPTION - LOCATION: LOCATION: LEG

## 2023-11-14 NOTE — DISCHARGE INSTRUCTIONS
Thank you! Thank you for allowing me to care for you in the emergency department. It is my goal to provide you with excellent care. If you have not received excellent quality care, please ask to speak to the nurse manager. Please fill out the survey that will come to you by mail or email since we listen to your feedback! Below you will find a list of your tests from today's visit. Should you have any questions, please do not hesitate to call the emergency department.     Labs  Recent Results (from the past 12 hour(s))   CBC with Auto Differential    Collection Time: 11/14/23  4:04 PM   Result Value Ref Range    WBC 7.0 4.1 - 11.1 K/uL    RBC 2.89 (L) 4.10 - 5.70 M/uL    Hemoglobin 10.2 (L) 12.1 - 17.0 g/dL    Hematocrit 29.8 (L) 36.6 - 50.3 %    .1 (H) 80.0 - 99.0 FL    MCH 35.3 (H) 26.0 - 34.0 PG    MCHC 34.2 30.0 - 36.5 g/dL    RDW 19.8 (H) 11.5 - 14.5 %    Platelets 620 (H) 806 - 400 K/uL    MPV 8.9 8.9 - 12.9 FL    Nucleated RBCs 0.0 0.0  WBC    nRBC 0.00 0.00 - 0.01 K/uL    Neutrophils % 65 32 - 75 %    Lymphocytes % 22 12 - 49 %    Monocytes % 11 5 - 13 %    Eosinophils % 0 0 - 7 %    Basophils % 0 0 - 1 %    Immature Granulocytes 2 (H) 0 - 0.5 %    Neutrophils Absolute 4.5 1.8 - 8.0 K/UL    Lymphocytes Absolute 1.6 0.8 - 3.5 K/UL    Monocytes Absolute 0.8 0.0 - 1.0 K/UL    Eosinophils Absolute 0.0 0.0 - 0.4 K/UL    Basophils Absolute 0.0 0.0 - 0.1 K/UL    Absolute Immature Granulocyte 0.1 (H) 0.00 - 0.04 K/UL    Differential Type AUTOMATED     Comprehensive Metabolic Panel    Collection Time: 11/14/23  4:04 PM   Result Value Ref Range    Sodium 138 136 - 145 mmol/L    Potassium 3.4 (L) 3.5 - 5.1 mmol/L    Chloride 103 97 - 108 mmol/L    CO2 25 21 - 32 mmol/L    Anion Gap 10 5 - 15 mmol/L    Glucose 83 65 - 100 mg/dL    BUN 5 (L) 6 - 20 mg/dL    Creatinine 0.37 (L) 0.70 - 1.30 mg/dL    Bun/Cre Ratio 14 12 - 20      Est, Glom Filt Rate >60 >60 ml/min/1.73m2    Calcium 8.1 (L) 8.5 - 10.1

## 2023-11-14 NOTE — ED TRIAGE NOTES
Pt was sent by PCP due to leg swelling  pt was recently here and discharge on 11/10/2023 for same.  Possible ETOH on board per EMS

## 2023-11-14 NOTE — ED PROVIDER NOTES
should their symptoms worsen. PATIENT REFERRED TO:  Marcelina Ceron, APRN - NP  [address] [place] [address] [id number] [place] [address]  [phone]    In 2 days          DISCHARGE MEDICATIONS:     Medication List        ASK your doctor about these medications      cephALEXin 500 MG capsule  Commonly known as: Keflex  Take 1 capsule by mouth 3 times daily for 7 days     doxycycline hyclate 100 MG tablet  Commonly known as: VIBRA-TABS  Take 1 tablet by mouth 2 times daily for 7 days     furosemide 40 MG tablet  Commonly known as: LASIX     gabapentin 100 MG capsule  Commonly known as: NEURONTIN     magnesium oxide 400 (240 Mg) MG tablet  Commonly known as: MAG-OX  Take 1 tablet by mouth daily     midodrine 2.5 MG tablet  Commonly known as: PROAMATINE  Take 1 tablet by mouth 2 times daily as needed (BP < 100)     naproxen 500 MG tablet  Commonly known as: NAPROSYN  Take 1 tablet by mouth 2 times daily (with meals)     pantoprazole 40 MG tablet  Commonly known as: PROTONIX     potassium chloride 20 MEQ extended release tablet  Commonly known as: KLOR-CON M  Take 1 tablet by mouth daily     vitamin B-1 100 MG tablet  Commonly known as: THIAMINE                DISCONTINUED MEDICATIONS:  Current Discharge Medication List          I am the Primary Clinician of Record. Minesh Dias MD (electronically signed)    (Please note that parts of this dictation were completed with voice recognition software. Quite often unanticipated grammatical, syntax, homophones, and other interpretive errors are inadvertently transcribed by the computer software. Please disregards these errors.  Please excuse any errors that have escaped final proofreading.)     Minesh Dias MD  11/14/23 8915

## 2023-12-13 ENCOUNTER — NURSE TRIAGE (OUTPATIENT)
Dept: OTHER | Facility: CLINIC | Age: 58
End: 2023-12-13

## 2023-12-13 NOTE — TELEPHONE ENCOUNTER
[facility]    Received call from [facility] with SLID. Subjective: Caller states \"I am not getting any relief. I am in [facility]. I will fall out of bed on the floor and I have to call the emergency group. \"     Current Symptoms: pain in legs, swelling in feet     Onset: 5 month ago;     Associated Symptoms: NA    Pain Severity: 10/10; constant     Temperature: denies     What has been tried: naproxen, gabapentin, tylenol, aleve      LMP: NA Pregnant: NA    Recommended disposition: Go to [facility]    Care advice provided, patient verbalizes understanding; denies any other questions or concerns; instructed to call back for any new or worsening symptoms. Patient/Caller agrees with recommended disposition; writer provided warm transfer to [employer] at [facility] for appointment scheduling    Attention Provider: Thank you for allowing me to participate in the care of your patient. The patient was connected to triage in response to information provided to the ECC/PSC. Please do not respond through this encounter as the response is not directed to a shared pool.     Reason for Disposition   SEVERE pain (e.g., excruciating, unable to do any normal activities)    Protocols used: Leg Pain-ADULT-OH

## 2023-12-20 ENCOUNTER — HOSPITAL ENCOUNTER (INPATIENT)
Facility: HOSPITAL | Age: 58
LOS: 4 days | Discharge: HOME | DRG: 720 | End: 2023-12-24
Attending: EMERGENCY MEDICINE | Admitting: HOSPITALIST
Payer: MEDICAID

## 2023-12-20 ENCOUNTER — HOSPITAL ENCOUNTER (EMERGENCY)
Facility: HOSPITAL | Age: 58
Discharge: HOME | DRG: 720 | End: 2023-12-22
Attending: EMERGENCY MEDICINE
Payer: MEDICAID

## 2023-12-20 ENCOUNTER — APPOINTMENT (OUTPATIENT)
Facility: HOSPITAL | Age: 58
DRG: 720 | End: 2023-12-20
Payer: MEDICAID

## 2023-12-20 DIAGNOSIS — L03.116 CELLULITIS OF LEFT LOWER EXTREMITY: Primary | ICD-10-CM

## 2023-12-20 DIAGNOSIS — F10.10 ALCOHOL ABUSE: ICD-10-CM

## 2023-12-20 DIAGNOSIS — Z78.9 FAILURE OF OUTPATIENT TREATMENT: ICD-10-CM

## 2023-12-20 DIAGNOSIS — L03.115 BILATERAL LOWER LEG CELLULITIS: ICD-10-CM

## 2023-12-20 DIAGNOSIS — I73.9 PVD (PERIPHERAL VASCULAR DISEASE) (HCC): ICD-10-CM

## 2023-12-20 DIAGNOSIS — L03.115 CELLULITIS OF RIGHT LOWER EXTREMITY: ICD-10-CM

## 2023-12-20 DIAGNOSIS — L03.116 BILATERAL LOWER LEG CELLULITIS: ICD-10-CM

## 2023-12-20 DIAGNOSIS — F10.929 ACUTE ALCOHOLIC INTOXICATION WITH COMPLICATION (HCC): ICD-10-CM

## 2023-12-20 LAB
ALBUMIN SERPL-MCNC: 2.1 G/DL (ref 3.5–5)
ALBUMIN/GLOB SERPL: 0.7 (ref 1.1–2.2)
ALP SERPL-CCNC: 212 U/L (ref 45–117)
ALT SERPL-CCNC: 106 U/L (ref 12–78)
ANION GAP SERPL CALC-SCNC: 14 MMOL/L (ref 5–15)
AST SERPL W P-5'-P-CCNC: 679 U/L (ref 15–37)
BASOPHILS # BLD: 0 K/UL (ref 0–0.1)
BASOPHILS NFR BLD: 0 % (ref 0–1)
BILIRUB SERPL-MCNC: 2.1 MG/DL (ref 0.2–1)
BNP SERPL-MCNC: 487 PG/ML
BUN SERPL-MCNC: 5 MG/DL (ref 6–20)
BUN/CREAT SERPL: 14 (ref 12–20)
CA-I BLD-MCNC: 8.4 MG/DL (ref 8.5–10.1)
CHLORIDE SERPL-SCNC: 106 MMOL/L (ref 97–108)
CK SERPL-CCNC: 49 U/L (ref 39–308)
CK SERPL-CCNC: 64 U/L (ref 39–308)
CO2 SERPL-SCNC: 19 MMOL/L (ref 21–32)
CREAT SERPL-MCNC: 0.37 MG/DL (ref 0.7–1.3)
CRP SERPL-MCNC: 6.04 MG/DL (ref 0–0.6)
DIFFERENTIAL METHOD BLD: ABNORMAL
ECHO BSA: 1.99 M2
EKG ATRIAL RATE: 106 BPM
EKG DIAGNOSIS: NORMAL
EKG P AXIS: 37 DEGREES
EKG P-R INTERVAL: 150 MS
EKG Q-T INTERVAL: 372 MS
EKG QRS DURATION: 80 MS
EKG QTC CALCULATION (BAZETT): 494 MS
EKG R AXIS: -12 DEGREES
EKG T AXIS: 16 DEGREES
EKG VENTRICULAR RATE: 106 BPM
EOSINOPHIL # BLD: 0 K/UL (ref 0–0.4)
EOSINOPHIL NFR BLD: 0 % (ref 0–7)
ERYTHROCYTE [DISTWIDTH] IN BLOOD BY AUTOMATED COUNT: 16.9 % (ref 11.5–14.5)
ERYTHROCYTE [SEDIMENTATION RATE] IN BLOOD: 50 MM/HR (ref 0–20)
ETHANOL SERPL-MCNC: 207 MG/DL (ref 0–0.08)
GLOBULIN SER CALC-MCNC: 3.2 G/DL (ref 2–4)
GLUCOSE SERPL-MCNC: 60 MG/DL (ref 65–100)
HCT VFR BLD AUTO: 28.5 % (ref 36.6–50.3)
HGB BLD-MCNC: 9.4 G/DL (ref 12.1–17)
IMM GRANULOCYTES # BLD AUTO: 0.1 K/UL (ref 0–0.04)
IMM GRANULOCYTES NFR BLD AUTO: 1 % (ref 0–0.5)
LACTATE BLD-SCNC: 1.75 MMOL/L (ref 0.4–2)
LACTATE BLD-SCNC: 2.4 MMOL/L (ref 0.4–2)
LYMPHOCYTES # BLD: 2.9 K/UL (ref 0.8–3.5)
LYMPHOCYTES NFR BLD: 22 % (ref 12–49)
MCH RBC QN AUTO: 36.2 PG (ref 26–34)
MCHC RBC AUTO-ENTMCNC: 33 G/DL (ref 30–36.5)
MCV RBC AUTO: 109.6 FL (ref 80–99)
MONOCYTES # BLD: 0.7 K/UL (ref 0–1)
MONOCYTES NFR BLD: 5 % (ref 5–13)
NEUTS SEG # BLD: 9.7 K/UL (ref 1.8–8)
NEUTS SEG NFR BLD: 72 % (ref 32–75)
NRBC # BLD: 0 K/UL (ref 0–0.01)
NRBC BLD-RTO: 0 PER 100 WBC
PERFORMED BY:: ABNORMAL
PERFORMED BY:: NORMAL
PLATELET # BLD AUTO: 303 K/UL (ref 150–400)
PMV BLD AUTO: 8.5 FL (ref 8.9–12.9)
POTASSIUM SERPL-SCNC: 3.6 MMOL/L (ref 3.5–5.1)
PROCALCITONIN SERPL-MCNC: 13.65 NG/ML
PROT SERPL-MCNC: 5.3 G/DL (ref 6.4–8.2)
RBC # BLD AUTO: 2.6 M/UL (ref 4.1–5.7)
RBC MORPH BLD: ABNORMAL
SODIUM SERPL-SCNC: 139 MMOL/L (ref 136–145)
TROPONIN I SERPL HS-MCNC: 9 NG/L (ref 0–76)
WBC # BLD AUTO: 13.4 K/UL (ref 4.1–11.1)

## 2023-12-20 PROCEDURE — 93005 ELECTROCARDIOGRAM TRACING: CPT | Performed by: EMERGENCY MEDICINE

## 2023-12-20 PROCEDURE — 83880 ASSAY OF NATRIURETIC PEPTIDE: CPT

## 2023-12-20 PROCEDURE — 85025 COMPLETE CBC W/AUTO DIFF WBC: CPT

## 2023-12-20 PROCEDURE — 87040 BLOOD CULTURE FOR BACTERIA: CPT

## 2023-12-20 PROCEDURE — 96374 THER/PROPH/DIAG INJ IV PUSH: CPT

## 2023-12-20 PROCEDURE — 80053 COMPREHEN METABOLIC PANEL: CPT

## 2023-12-20 PROCEDURE — 6370000000 HC RX 637 (ALT 250 FOR IP)

## 2023-12-20 PROCEDURE — 86060 ANTISTREPTOLYSIN O TITER: CPT

## 2023-12-20 PROCEDURE — 2580000003 HC RX 258: Performed by: INTERNAL MEDICINE

## 2023-12-20 PROCEDURE — 6370000000 HC RX 637 (ALT 250 FOR IP): Performed by: EMERGENCY MEDICINE

## 2023-12-20 PROCEDURE — 85652 RBC SED RATE AUTOMATED: CPT

## 2023-12-20 PROCEDURE — 86140 C-REACTIVE PROTEIN: CPT

## 2023-12-20 PROCEDURE — 2580000003 HC RX 258

## 2023-12-20 PROCEDURE — 71045 X-RAY EXAM CHEST 1 VIEW: CPT

## 2023-12-20 PROCEDURE — 82550 ASSAY OF CK (CPK): CPT

## 2023-12-20 PROCEDURE — 6360000002 HC RX W HCPCS: Performed by: EMERGENCY MEDICINE

## 2023-12-20 PROCEDURE — 84145 PROCALCITONIN (PCT): CPT

## 2023-12-20 PROCEDURE — 1100000000 HC RM PRIVATE

## 2023-12-20 PROCEDURE — 99285 EMERGENCY DEPT VISIT HI MDM: CPT

## 2023-12-20 PROCEDURE — 93970 EXTREMITY STUDY: CPT

## 2023-12-20 PROCEDURE — 82077 ASSAY SPEC XCP UR&BREATH IA: CPT

## 2023-12-20 PROCEDURE — 2580000003 HC RX 258: Performed by: EMERGENCY MEDICINE

## 2023-12-20 PROCEDURE — 96375 TX/PRO/DX INJ NEW DRUG ADDON: CPT

## 2023-12-20 PROCEDURE — 6360000002 HC RX W HCPCS

## 2023-12-20 PROCEDURE — 82746 ASSAY OF FOLIC ACID SERUM: CPT

## 2023-12-20 PROCEDURE — 6360000002 HC RX W HCPCS: Performed by: INTERNAL MEDICINE

## 2023-12-20 PROCEDURE — 82607 VITAMIN B-12: CPT

## 2023-12-20 PROCEDURE — 86215 DEOXYRIBONUCLEASE ANTIBODY: CPT

## 2023-12-20 PROCEDURE — 99223 1ST HOSP IP/OBS HIGH 75: CPT | Performed by: INTERNAL MEDICINE

## 2023-12-20 PROCEDURE — 93970 EXTREMITY STUDY: CPT | Performed by: SURGERY

## 2023-12-20 PROCEDURE — 83605 ASSAY OF LACTIC ACID: CPT

## 2023-12-20 PROCEDURE — 84484 ASSAY OF TROPONIN QUANT: CPT

## 2023-12-20 RX ORDER — SODIUM CHLORIDE 0.9 % (FLUSH) 0.9 %
5-40 SYRINGE (ML) INJECTION PRN
Status: DISCONTINUED | OUTPATIENT
Start: 2023-12-20 | End: 2023-12-24 | Stop reason: HOSPADM

## 2023-12-20 RX ORDER — 0.9 % SODIUM CHLORIDE 0.9 %
1000 INTRAVENOUS SOLUTION INTRAVENOUS ONCE
Status: COMPLETED | OUTPATIENT
Start: 2023-12-20 | End: 2023-12-20

## 2023-12-20 RX ORDER — POLYETHYLENE GLYCOL 3350 17 G/17G
17 POWDER, FOR SOLUTION ORAL DAILY PRN
Status: DISCONTINUED | OUTPATIENT
Start: 2023-12-20 | End: 2023-12-24 | Stop reason: HOSPADM

## 2023-12-20 RX ORDER — SODIUM CHLORIDE 9 MG/ML
INJECTION, SOLUTION INTRAVENOUS PRN
Status: DISCONTINUED | OUTPATIENT
Start: 2023-12-20 | End: 2023-12-24 | Stop reason: HOSPADM

## 2023-12-20 RX ORDER — FUROSEMIDE 40 MG/1
40 TABLET ORAL DAILY
Status: DISCONTINUED | OUTPATIENT
Start: 2023-12-20 | End: 2023-12-24 | Stop reason: HOSPADM

## 2023-12-20 RX ORDER — ACETAMINOPHEN 650 MG/1
650 SUPPOSITORY RECTAL EVERY 6 HOURS PRN
Status: DISCONTINUED | OUTPATIENT
Start: 2023-12-20 | End: 2023-12-24 | Stop reason: HOSPADM

## 2023-12-20 RX ORDER — FOLIC ACID 1 MG/1
1 TABLET ORAL ONCE
Status: COMPLETED | OUTPATIENT
Start: 2023-12-20 | End: 2023-12-20

## 2023-12-20 RX ORDER — LORAZEPAM 1 MG/1
4 TABLET ORAL
Status: DISCONTINUED | OUTPATIENT
Start: 2023-12-20 | End: 2023-12-24 | Stop reason: HOSPADM

## 2023-12-20 RX ORDER — LANOLIN ALCOHOL/MO/W.PET/CERES
3 CREAM (GRAM) TOPICAL NIGHTLY PRN
Status: DISCONTINUED | OUTPATIENT
Start: 2023-12-20 | End: 2023-12-24 | Stop reason: HOSPADM

## 2023-12-20 RX ORDER — ACETAMINOPHEN 325 MG/1
650 TABLET ORAL EVERY 6 HOURS PRN
Status: DISCONTINUED | OUTPATIENT
Start: 2023-12-20 | End: 2023-12-24 | Stop reason: HOSPADM

## 2023-12-20 RX ORDER — LORAZEPAM 2 MG/ML
2 INJECTION INTRAMUSCULAR
Status: DISCONTINUED | OUTPATIENT
Start: 2023-12-20 | End: 2023-12-24 | Stop reason: HOSPADM

## 2023-12-20 RX ORDER — KETOROLAC TROMETHAMINE 30 MG/ML
30 INJECTION, SOLUTION INTRAMUSCULAR; INTRAVENOUS ONCE
Status: COMPLETED | OUTPATIENT
Start: 2023-12-20 | End: 2023-12-20

## 2023-12-20 RX ORDER — CEFTRIAXONE 1 G/1
INJECTION, POWDER, FOR SOLUTION INTRAMUSCULAR; INTRAVENOUS
Status: DISCONTINUED
Start: 2023-12-20 | End: 2023-12-21

## 2023-12-20 RX ORDER — MAGNESIUM SULFATE IN WATER 40 MG/ML
2000 INJECTION, SOLUTION INTRAVENOUS PRN
Status: DISCONTINUED | OUTPATIENT
Start: 2023-12-20 | End: 2023-12-24 | Stop reason: HOSPADM

## 2023-12-20 RX ORDER — GAUZE BANDAGE 2" X 2"
100 BANDAGE TOPICAL DAILY
Status: DISCONTINUED | OUTPATIENT
Start: 2023-12-20 | End: 2023-12-24 | Stop reason: HOSPADM

## 2023-12-20 RX ORDER — LORAZEPAM 1 MG/1
2 TABLET ORAL
Status: DISCONTINUED | OUTPATIENT
Start: 2023-12-20 | End: 2023-12-24 | Stop reason: HOSPADM

## 2023-12-20 RX ORDER — LORAZEPAM 2 MG/ML
1 INJECTION INTRAMUSCULAR
Status: DISCONTINUED | OUTPATIENT
Start: 2023-12-20 | End: 2023-12-24 | Stop reason: HOSPADM

## 2023-12-20 RX ORDER — SODIUM CHLORIDE 0.9 % (FLUSH) 0.9 %
5-40 SYRINGE (ML) INJECTION EVERY 12 HOURS SCHEDULED
Status: DISCONTINUED | OUTPATIENT
Start: 2023-12-20 | End: 2023-12-21

## 2023-12-20 RX ORDER — PANTOPRAZOLE SODIUM 40 MG/1
40 TABLET, DELAYED RELEASE ORAL DAILY
Status: DISCONTINUED | OUTPATIENT
Start: 2023-12-20 | End: 2023-12-20

## 2023-12-20 RX ORDER — POTASSIUM CHLORIDE 20 MEQ/1
20 TABLET, EXTENDED RELEASE ORAL DAILY
Status: DISCONTINUED | OUTPATIENT
Start: 2023-12-20 | End: 2023-12-24 | Stop reason: HOSPADM

## 2023-12-20 RX ORDER — GABAPENTIN 100 MG/1
100 CAPSULE ORAL 3 TIMES DAILY
Status: DISCONTINUED | OUTPATIENT
Start: 2023-12-20 | End: 2023-12-24 | Stop reason: HOSPADM

## 2023-12-20 RX ORDER — TRAMADOL HYDROCHLORIDE 50 MG/1
50 TABLET ORAL EVERY 6 HOURS PRN
Status: DISCONTINUED | OUTPATIENT
Start: 2023-12-20 | End: 2023-12-24 | Stop reason: HOSPADM

## 2023-12-20 RX ORDER — POTASSIUM CHLORIDE 20 MEQ/1
40 TABLET, EXTENDED RELEASE ORAL PRN
Status: DISCONTINUED | OUTPATIENT
Start: 2023-12-20 | End: 2023-12-24 | Stop reason: HOSPADM

## 2023-12-20 RX ORDER — LORAZEPAM 1 MG/1
1 TABLET ORAL
Status: DISCONTINUED | OUTPATIENT
Start: 2023-12-20 | End: 2023-12-24 | Stop reason: HOSPADM

## 2023-12-20 RX ORDER — ONDANSETRON 4 MG/1
4 TABLET, ORALLY DISINTEGRATING ORAL EVERY 8 HOURS PRN
Status: DISCONTINUED | OUTPATIENT
Start: 2023-12-20 | End: 2023-12-24 | Stop reason: HOSPADM

## 2023-12-20 RX ORDER — KETOROLAC TROMETHAMINE 15 MG/ML
15 INJECTION, SOLUTION INTRAMUSCULAR; INTRAVENOUS ONCE
Status: COMPLETED | OUTPATIENT
Start: 2023-12-20 | End: 2023-12-20

## 2023-12-20 RX ORDER — LORAZEPAM 2 MG/ML
3 INJECTION INTRAMUSCULAR
Status: DISCONTINUED | OUTPATIENT
Start: 2023-12-20 | End: 2023-12-24 | Stop reason: HOSPADM

## 2023-12-20 RX ORDER — SODIUM CHLORIDE 0.9 % (FLUSH) 0.9 %
5-40 SYRINGE (ML) INJECTION EVERY 12 HOURS SCHEDULED
Status: DISCONTINUED | OUTPATIENT
Start: 2023-12-20 | End: 2023-12-24 | Stop reason: HOSPADM

## 2023-12-20 RX ORDER — PANTOPRAZOLE SODIUM 40 MG/1
40 TABLET, DELAYED RELEASE ORAL
Status: DISCONTINUED | OUTPATIENT
Start: 2023-12-21 | End: 2023-12-24 | Stop reason: HOSPADM

## 2023-12-20 RX ORDER — MULTIVITAMIN WITH IRON
1 TABLET ORAL ONCE
Status: COMPLETED | OUTPATIENT
Start: 2023-12-20 | End: 2023-12-20

## 2023-12-20 RX ORDER — THIAMINE HYDROCHLORIDE 100 MG/ML
200 INJECTION, SOLUTION INTRAMUSCULAR; INTRAVENOUS ONCE
Status: COMPLETED | OUTPATIENT
Start: 2023-12-20 | End: 2023-12-20

## 2023-12-20 RX ORDER — LANOLIN ALCOHOL/MO/W.PET/CERES
400 CREAM (GRAM) TOPICAL DAILY
Status: DISCONTINUED | OUTPATIENT
Start: 2023-12-20 | End: 2023-12-24 | Stop reason: HOSPADM

## 2023-12-20 RX ORDER — LORAZEPAM 2 MG/ML
4 INJECTION INTRAMUSCULAR
Status: DISCONTINUED | OUTPATIENT
Start: 2023-12-20 | End: 2023-12-24 | Stop reason: HOSPADM

## 2023-12-20 RX ORDER — MIDODRINE HYDROCHLORIDE 5 MG/1
2.5 TABLET ORAL 2 TIMES DAILY PRN
Status: DISCONTINUED | OUTPATIENT
Start: 2023-12-20 | End: 2023-12-24 | Stop reason: HOSPADM

## 2023-12-20 RX ORDER — POTASSIUM CHLORIDE 7.45 MG/ML
10 INJECTION INTRAVENOUS PRN
Status: DISCONTINUED | OUTPATIENT
Start: 2023-12-20 | End: 2023-12-24 | Stop reason: HOSPADM

## 2023-12-20 RX ORDER — ONDANSETRON 2 MG/ML
4 INJECTION INTRAMUSCULAR; INTRAVENOUS EVERY 6 HOURS PRN
Status: DISCONTINUED | OUTPATIENT
Start: 2023-12-20 | End: 2023-12-24 | Stop reason: HOSPADM

## 2023-12-20 RX ORDER — SODIUM CHLORIDE 9 MG/ML
INJECTION, SOLUTION INTRAVENOUS PRN
Status: DISCONTINUED | OUTPATIENT
Start: 2023-12-20 | End: 2023-12-20 | Stop reason: SDUPTHER

## 2023-12-20 RX ORDER — LORAZEPAM 1 MG/1
3 TABLET ORAL
Status: DISCONTINUED | OUTPATIENT
Start: 2023-12-20 | End: 2023-12-24 | Stop reason: HOSPADM

## 2023-12-20 RX ORDER — ENOXAPARIN SODIUM 100 MG/ML
40 INJECTION SUBCUTANEOUS DAILY
Status: DISCONTINUED | OUTPATIENT
Start: 2023-12-20 | End: 2023-12-24 | Stop reason: HOSPADM

## 2023-12-20 RX ORDER — MORPHINE SULFATE 4 MG/ML
4 INJECTION, SOLUTION INTRAMUSCULAR; INTRAVENOUS
Status: COMPLETED | OUTPATIENT
Start: 2023-12-20 | End: 2023-12-20

## 2023-12-20 RX ADMIN — ENOXAPARIN SODIUM 40 MG: 100 INJECTION SUBCUTANEOUS at 17:50

## 2023-12-20 RX ADMIN — SODIUM CHLORIDE, PRESERVATIVE FREE 10 ML: 5 INJECTION INTRAVENOUS at 22:44

## 2023-12-20 RX ADMIN — LORAZEPAM 1 MG: 1 TABLET ORAL at 21:00

## 2023-12-20 RX ADMIN — Medication 400 MG: at 17:49

## 2023-12-20 RX ADMIN — POTASSIUM CHLORIDE 20 MEQ: 1500 TABLET, EXTENDED RELEASE ORAL at 17:49

## 2023-12-20 RX ADMIN — SODIUM CHLORIDE 1000 ML: 9 INJECTION, SOLUTION INTRAVENOUS at 12:40

## 2023-12-20 RX ADMIN — THIAMINE HYDROCHLORIDE 200 MG: 100 INJECTION, SOLUTION INTRAMUSCULAR; INTRAVENOUS at 14:47

## 2023-12-20 RX ADMIN — KETOROLAC TROMETHAMINE 30 MG: 30 INJECTION, SOLUTION INTRAMUSCULAR; INTRAVENOUS at 17:53

## 2023-12-20 RX ADMIN — KETOROLAC TROMETHAMINE 15 MG: 15 INJECTION, SOLUTION INTRAMUSCULAR; INTRAVENOUS at 12:41

## 2023-12-20 RX ADMIN — TRAMADOL HYDROCHLORIDE 50 MG: 50 TABLET ORAL at 21:01

## 2023-12-20 RX ADMIN — SODIUM CHLORIDE 100 MG: 9 INJECTION, SOLUTION INTRAVENOUS at 22:34

## 2023-12-20 RX ADMIN — GABAPENTIN 100 MG: 100 CAPSULE ORAL at 21:01

## 2023-12-20 RX ADMIN — THERA TABS 1 TABLET: TAB at 14:47

## 2023-12-20 RX ADMIN — FOLIC ACID 1 MG: 1 TABLET ORAL at 14:47

## 2023-12-20 RX ADMIN — CEFTRIAXONE SODIUM 1000 MG: 1 INJECTION, POWDER, FOR SOLUTION INTRAMUSCULAR; INTRAVENOUS at 12:41

## 2023-12-20 RX ADMIN — THIAMINE HCL TAB 100 MG 100 MG: 100 TAB at 17:49

## 2023-12-20 RX ADMIN — VANCOMYCIN HYDROCHLORIDE 1250 MG: 1.25 INJECTION, POWDER, LYOPHILIZED, FOR SOLUTION INTRAVENOUS at 13:40

## 2023-12-20 RX ADMIN — MORPHINE SULFATE 4 MG: 4 INJECTION, SOLUTION INTRAMUSCULAR; INTRAVENOUS at 14:47

## 2023-12-20 NOTE — ED PROVIDER NOTES
chloride flush 0.9 % injection 5-40 mL  5-40 mL IntraVENous PRN Tonja Shannon PA-C        potassium chloride (KLOR-CON M) extended release tablet 40 mEq  40 mEq Oral PRN Tonja Shannon PA-C        Or    potassium bicarb-citric acid (EFFER-K) effervescent tablet 40 mEq  40 mEq Oral PRN Tonja Shannon PA-C        Or    potassium chloride 10 mEq/100 mL IVPB (Peripheral Line)  10 mEq IntraVENous PRN Tonja Shannon PA-C        magnesium sulfate 2000 mg in 50 mL IVPB premix  2,000 mg IntraVENous PRN Tonja Shannon PA-C        enoxaparin (LOVENOX) injection 40 mg  40 mg SubCUTAneous Daily Tonja Shannon PA-C   40 mg at 12/20/23 1750    ondansetron (ZOFRAN-ODT) disintegrating tablet 4 mg  4 mg Oral Q8H PRN Tonja Shannon PA-C        Or    ondansetron (ZOFRAN) injection 4 mg  4 mg IntraVENous Q6H PRN Tonja Shannon PA-C        polyethylene glycol (GLYCOLAX) packet 17 g  17 g Oral Daily PRN Tonja Shannon PA-C        acetaminophen (TYLENOL) tablet 650 mg  650 mg Oral Q6H PRN Tonja Shannon PA-C        Or    acetaminophen (TYLENOL) suppository 650 mg  650 mg Rectal Q6H PRN Tonja Shannon PA-C        melatonin tablet 3 mg  3 mg Oral Nightly PRN Tonja Shannon PA-C        furosemide (LASIX) tablet 40 mg  40 mg Oral Daily Tonja Shannon PA-C        gabapentin (NEURONTIN) capsule 100 mg  100 mg Oral TID Tonja Correa PA-C   100 mg at 12/20/23 2101    magnesium oxide (MAG-OX) tablet 400 mg  400 mg Oral Daily Tonja Shannon PA-C   400 mg at 12/20/23 1749    midodrine (PROAMATINE) tablet 2.5 mg  2.5 mg Oral BID PRN Tonja Shannon PA-C        potassium chloride (KLOR-CON M) extended release tablet 20 mEq  20 mEq Oral Daily Tonja Shannon PA-C   20 mEq at 12/20/23 1749    thiamine mononitrate tablet 100 mg  100 mg Oral Daily Tonja Shannon PA-C   100 mg at 12/20/23 1749    pantoprazole (PROTONIX) tablet 40 mg  40 mg Oral [facility] Tonja Shannon PA-C        sodium chloride flush

## 2023-12-20 NOTE — H&P
Hospitalist Admission Note    NAME: Sanaz Funez   :  1965   MRN:  [id number]     Date/Time:  2023 4:51 PM    Patient PCP: MILAN Wu - NP    ______________________________________________________________________  Given the patient's current clinical presentation, I have a high level of concern for decompensation if discharged from the emergency department. Complex decision making was performed, which includes reviewing the patient's available past medical records, laboratory results, and x-ray films. My assessment of this patient's clinical condition and my plan of care is as follows. Assessment / Plan:  Bilateral lower extremity cellulitis vs drug eruption vs SJS   Leukocytosis  -Failed outpatient treatment, cephalexin and doxycycline about 1 month ago. Unsure if pt completed abx. Erythema and extreme tenderness in lower extremities and hands. Sloughing of skin on hands and lower extremities including soles of feet. Blisters and eschars present. - proBNP 487  --WBC 13.4  - Lower extremity duplex shows no evidence of DVT in left or right extremities. --PAMELA ordered  - Blood cultures pending  - Lactic acid 2.4, repeat 1.75  - not starting abx due to possibility of drug eruption  --consult ID  - Wound care nurse consulted for tomorrow    Lower extremity edema  -continue lasix    Macrocytic anemia  -Hgb 9.4  - Check B12, folate  - Monitor hemoglobin, transfuse for hemoglobin under 7. Alcohol use  Unsure how much patient drinks. Patient states he only has 1-2 drinks per day, denies any use today. Ethanol at 207.  - [facility] protocol ordered    Hypotension  -Patient on midodrine for low blood pressure, patient does not check his blood pressure before taking midodrine. He states he takes midodrine every day. - Continue midodrine as needed    GERD  -Continue Protonix    Tobacco use  -Patient denies need for nicotine patch at this time. Cessation education provided.     Medical

## 2023-12-20 NOTE — CONSULTS
Consult Note            Date:12/20/2023        Patient Name:Checo Allison     YOB: 1965     Age:58 y.o. Consults INFECTIOUS DISEASE      Chief Complaint     Chief Complaint   Patient presents with    Leg Pain      Upper and lower extremity erythema, slough of skin, cellulitis failed treatment vs drug eruption    History Obtained From   patient    History of Present Illness   This is a 59year old male with apparently persistent bilateral lower extremity swelling and redness for several months of unknown etiology. He was afebrile with WBC 13,400 and elevated lactic acid. CXR unremarkable. US showed no evidence of DVT in either leg. Blood cultures sent . Patient has received single doses of Vancomycin and Ceftriaxone. CT scan of lower extremities ordered. Patient lying in bed complaining of severe pain in his calves and feet. States that the problem started \"6 months ago\" and that he had not had any exposure to antibiotics prior to onset. Skin changes typified reportedly by large blisters on his lower extremities and hands but  nothing on his face or trunk. He also denies any  oral lesions. He has no pertinent family history. He reportedly has not seen a Dermatologist.    Past Medical History     Past Medical History:   Diagnosis Date    Bilateral lower extremity edema     Gastrointestinal disorder     Hypertension         Past Surgical History   History reviewed. No pertinent surgical history. Medications     Prior to Admission medications    Medication Sig Start Date End Date Taking?  Authorizing Provider   furosemide (LASIX) 40 MG tablet Take 1 tablet by mouth daily 10/31/23  Yes Provider, MD Cecilio   magnesium oxide (MAG-OX) 400 (240 Mg) MG tablet Take 1 tablet by mouth daily 7/10/23  Yes Vickie Vazquez MD   naproxen (NAPROSYN) 500 MG tablet Take 1 tablet by mouth 2 times daily (with meals)  Patient not taking: Reported on 12/20/2023 11/10/23   Hesham Ladnrum MD

## 2023-12-20 NOTE — ED TRIAGE NOTES
Patient comes for bilateral lower extremity edema and reddness that has been going on for a few months, has been seen by PCP and does not know the cause yet.  Patient possibly intoxicated per EMS report

## 2023-12-20 NOTE — CARE COORDINATION
12/20/23 1894   Service Assessment   Patient Orientation Alert and Oriented   Cognition Alert   History Provided By Patient   Primary Caregiver Self   Accompanied By/Relationship Pt alone during interview. Support Systems Family Members;Parent   Patient's Healthcare Decision Maker is: Legal Next of Kin   PCP Verified by CM Yes  (Per pt no PCP.)   Last Visit to PCP   (No PCP.)   Prior Functional Level Independent in ADLs/IADLs   Current Functional Level Independent in ADLs/IADLs   Can patient return to prior living arrangement Yes   Ability to make needs known: Good   Family able to assist with home care needs: Yes   Would you like for me to discuss the discharge plan with any other family members/significant others, and if so, who? Yes  (Mother Nicolas Guzmán)   Financial Resources  Resources None   Social/Functional History   Lives With Parent  (Lives with mother.)   Type of [address] [place] [address] One level   Home Access Stairs to enter with rails   Entrance Stairs - Number of Steps 3 outside steps. Bathroom Shower/Tub Tub/Shower unit   Bathroom Toilet Standard   Bathroom Accessibility Accessible   Home Equipment None   Receives Help From Family   ADL Assistance Independent   Homemaking Assistance Independent   Homemaking Responsibilities Yes   Ambulation Assistance Independent   Transfer Assistance Independent   Active  Yes   Occupation Unemployed   Discharge Planning   Type of [facility] [address] Parent   Current Services Prior To Admission None   Potential Assistance Needed N/A   DME Ordered? No   Potential Assistance Purchasing Medications No   Type of Home Care Services None   Patient expects to be discharged to: House   One/Two Story Residence One story   History of falls?  1  (Fell 2 weeks ago - no ED visit.)   Services At/After Discharge   Transition of Care Consult (CM Consult) Discharge Planning   Services [address] [facility] Discharge None   [employer]

## 2023-12-20 NOTE — ED NOTES
1055 Patient requested EMS plug in cell phone, patient then became upset that his phone was on the bed because it might fall. 1058 patient asked for writer to move his bed so that he has a better view of the TV    1100Patient requested 3 tylenol tables, informed that he must see a physician before he will received pain medications to which he replies that he will call someone who can handle that.     1111 patient requesting diet coke with light ice, informed patient he cannot have anything by mouth until the physician sees him    1115 patient yelling in the hallway, writer asked patient what he needed and he wanted something to prop his phone up in his lap to watch a show. 1152  - patient on call bell and needs to urinate. External male catheter placed. Patient also requesting warm blanks, a chili cheese dog and a diet coke. 1155 - patient requesting his breakfast tray, informed it is noon and once the provider allows him to eat we will find him something     1200 - patient has urinated on himself, writer and another staff member attempted to clean the patient and change is linen. 200 - ED MD at bedside, patient being disrespectful and verbally degrading nurses and providers and using profanity. Comments made to staff \"isn't she a bitch\", \"yall don't do shit here anyway\" \"do I need to write the shit on the wall I'm tired of saying it\"    [phone] - patient medicated with pain medications, and given a cup of water. Continues to say that no one is listening to his needs or addressing his complaints       1455 - patient requested pain medications and food, patient given morphine and a lunch box was provided along with nabs and fig newtons, patient says \"I dont eat that dry ass shit\" and wants to know if he can have menus provided for the food locations close by. Patient informed that he will have a diet tray ordered once he is admitted and until the he can have the box lunch provided for him.  Patient
Patient has failed to keep his arm in a position that allows his fluids to run therefore NS bolus is not complete.    Repeat Lactic is <2       1428 - patient continues to keep arm in a bent position even after multiple reminders, fluids still infusing
Spoke with ED MD as patients white count has resulted and he now meets sepsis criteria, MD said he will see the patient and then he will decide if sepsis orders are needed.       200 - ED MD placed Sepsis orders on patient narrator and checklist began at this time
1447)   multivitamin 1 tablet (1 tablet Oral Given 12/20/23 1447)   morphine (PF) injection 4 mg (4 mg IntraVENous Given 12/20/23 1447)     Last documented pain medication administration: Morphine IV @1447  Pertinent or High Risk Medications/Drips: no   If Yes, please provide details: no  Blood Product Administration: no  If Yes, please provide details: no  Process Protocols/Bundles: Sepsis Protocol/Bundle Completion-     Recommendation  Incomplete STAT orders: no  Overdue Medications: no  Patient Belongings:   Additional Comments:   If any further questions, please call Sending RN at [phone]    Electronically signed by: Electronically signed by Mohit León RN on 12/20/2023 at 3:23 PM

## 2023-12-21 ENCOUNTER — APPOINTMENT (OUTPATIENT)
Facility: HOSPITAL | Age: 58
DRG: 720 | End: 2023-12-21
Payer: MEDICAID

## 2023-12-21 PROBLEM — F10.10 ALCOHOL ABUSE: Status: ACTIVE | Noted: 2023-12-21

## 2023-12-21 LAB
ALBUMIN SERPL-MCNC: 2.2 G/DL (ref 3.5–5)
ALBUMIN/GLOB SERPL: 0.7 (ref 1.1–2.2)
ALP SERPL-CCNC: 297 U/L (ref 45–117)
ALT SERPL-CCNC: 84 U/L (ref 12–78)
ANION GAP SERPL CALC-SCNC: 8 MMOL/L (ref 5–15)
AST SERPL W P-5'-P-CCNC: 373 U/L (ref 15–37)
BASOPHILS # BLD: 0 K/UL (ref 0–0.1)
BASOPHILS NFR BLD: 0 % (ref 0–1)
BILIRUB SERPL-MCNC: 2 MG/DL (ref 0.2–1)
BUN SERPL-MCNC: 7 MG/DL (ref 6–20)
BUN/CREAT SERPL: 15 (ref 12–20)
CA-I BLD-MCNC: 8.2 MG/DL (ref 8.5–10.1)
CHLORIDE SERPL-SCNC: 105 MMOL/L (ref 97–108)
CO2 SERPL-SCNC: 26 MMOL/L (ref 21–32)
CREAT SERPL-MCNC: 0.48 MG/DL (ref 0.7–1.3)
CRP SERPL-MCNC: 5.02 MG/DL (ref 0–0.6)
DIFFERENTIAL METHOD BLD: ABNORMAL
EOSINOPHIL # BLD: 0 K/UL (ref 0–0.4)
EOSINOPHIL NFR BLD: 0 % (ref 0–7)
ERYTHROCYTE [DISTWIDTH] IN BLOOD BY AUTOMATED COUNT: 16.9 % (ref 11.5–14.5)
ERYTHROCYTE [SEDIMENTATION RATE] IN BLOOD: 43 MM/HR (ref 0–20)
FOLATE SERPL-MCNC: 2.6 NG/ML (ref 5–21)
GLOBULIN SER CALC-MCNC: 3.3 G/DL (ref 2–4)
GLUCOSE SERPL-MCNC: 107 MG/DL (ref 65–100)
HCT VFR BLD AUTO: 28.1 % (ref 36.6–50.3)
HGB BLD-MCNC: 9.6 G/DL (ref 12.1–17)
HIV 1+2 AB+HIV1 P24 AG SERPL QL IA: NONREACTIVE
HIV 1/2 RESULT COMMENT: NORMAL
IMM GRANULOCYTES # BLD AUTO: 0.1 K/UL (ref 0–0.04)
IMM GRANULOCYTES NFR BLD AUTO: 1 % (ref 0–0.5)
LYMPHOCYTES # BLD: 1.9 K/UL (ref 0.8–3.5)
LYMPHOCYTES NFR BLD: 16 % (ref 12–49)
MCH RBC QN AUTO: 36.4 PG (ref 26–34)
MCHC RBC AUTO-ENTMCNC: 34.2 G/DL (ref 30–36.5)
MCV RBC AUTO: 106.4 FL (ref 80–99)
MONOCYTES # BLD: 1.1 K/UL (ref 0–1)
MONOCYTES NFR BLD: 9 % (ref 5–13)
NEUTS SEG # BLD: 8.6 K/UL (ref 1.8–8)
NEUTS SEG NFR BLD: 74 % (ref 32–75)
NRBC # BLD: 0 K/UL (ref 0–0.01)
NRBC BLD-RTO: 0 PER 100 WBC
PLATELET # BLD AUTO: 279 K/UL (ref 150–400)
PMV BLD AUTO: 8.8 FL (ref 8.9–12.9)
POTASSIUM SERPL-SCNC: 3.5 MMOL/L (ref 3.5–5.1)
PROCALCITONIN SERPL-MCNC: 7.13 NG/ML
PROT SERPL-MCNC: 5.5 G/DL (ref 6.4–8.2)
RBC # BLD AUTO: 2.64 M/UL (ref 4.1–5.7)
RBC MORPH BLD: ABNORMAL
SODIUM SERPL-SCNC: 139 MMOL/L (ref 136–145)
VIT B12 SERPL-MCNC: 708 PG/ML (ref 193–986)
WBC # BLD AUTO: 11.7 K/UL (ref 4.1–11.1)

## 2023-12-21 PROCEDURE — 2580000003 HC RX 258

## 2023-12-21 PROCEDURE — 36415 COLL VENOUS BLD VENIPUNCTURE: CPT

## 2023-12-21 PROCEDURE — 99232 SBSQ HOSP IP/OBS MODERATE 35: CPT | Performed by: INTERNAL MEDICINE

## 2023-12-21 PROCEDURE — 6360000002 HC RX W HCPCS

## 2023-12-21 PROCEDURE — 86696 HERPES SIMPLEX TYPE 2 TEST: CPT

## 2023-12-21 PROCEDURE — 86708 HEPATITIS A ANTIBODY: CPT

## 2023-12-21 PROCEDURE — 86592 SYPHILIS TEST NON-TREP QUAL: CPT

## 2023-12-21 PROCEDURE — 93922 UPR/L XTREMITY ART 2 LEVELS: CPT

## 2023-12-21 PROCEDURE — 85025 COMPLETE CBC W/AUTO DIFF WBC: CPT

## 2023-12-21 PROCEDURE — 86787 VARICELLA-ZOSTER ANTIBODY: CPT

## 2023-12-21 PROCEDURE — 87389 HIV-1 AG W/HIV-1&-2 AB AG IA: CPT

## 2023-12-21 PROCEDURE — 1100000000 HC RM PRIVATE

## 2023-12-21 PROCEDURE — 6360000002 HC RX W HCPCS: Performed by: INTERNAL MEDICINE

## 2023-12-21 PROCEDURE — 80053 COMPREHEN METABOLIC PANEL: CPT

## 2023-12-21 PROCEDURE — 86704 HEP B CORE ANTIBODY TOTAL: CPT

## 2023-12-21 PROCEDURE — 86140 C-REACTIVE PROTEIN: CPT

## 2023-12-21 PROCEDURE — 86038 ANTINUCLEAR ANTIBODIES: CPT

## 2023-12-21 PROCEDURE — 84145 PROCALCITONIN (PCT): CPT

## 2023-12-21 PROCEDURE — 86160 COMPLEMENT ANTIGEN: CPT

## 2023-12-21 PROCEDURE — 2580000003 HC RX 258: Performed by: INTERNAL MEDICINE

## 2023-12-21 PROCEDURE — 6370000000 HC RX 637 (ALT 250 FOR IP)

## 2023-12-21 PROCEDURE — 80074 ACUTE HEPATITIS PANEL: CPT

## 2023-12-21 PROCEDURE — 86706 HEP B SURFACE ANTIBODY: CPT

## 2023-12-21 PROCEDURE — 86695 HERPES SIMPLEX TYPE 1 TEST: CPT

## 2023-12-21 PROCEDURE — 85652 RBC SED RATE AUTOMATED: CPT

## 2023-12-21 RX ORDER — LORAZEPAM 1 MG/1
2 TABLET ORAL ONCE
Status: COMPLETED | OUTPATIENT
Start: 2023-12-21 | End: 2023-12-21

## 2023-12-21 RX ORDER — FOLIC ACID 1 MG/1
1 TABLET ORAL DAILY
Status: DISCONTINUED | OUTPATIENT
Start: 2023-12-21 | End: 2023-12-24 | Stop reason: HOSPADM

## 2023-12-21 RX ORDER — HYDRALAZINE HYDROCHLORIDE 20 MG/ML
5 INJECTION INTRAMUSCULAR; INTRAVENOUS EVERY 6 HOURS PRN
Status: DISCONTINUED | OUTPATIENT
Start: 2023-12-21 | End: 2023-12-24 | Stop reason: HOSPADM

## 2023-12-21 RX ADMIN — LORAZEPAM 3 MG: 1 TABLET ORAL at 16:39

## 2023-12-21 RX ADMIN — LORAZEPAM 3 MG: 1 TABLET ORAL at 21:29

## 2023-12-21 RX ADMIN — TRAMADOL HYDROCHLORIDE 50 MG: 50 TABLET ORAL at 03:46

## 2023-12-21 RX ADMIN — TRAMADOL HYDROCHLORIDE 50 MG: 50 TABLET ORAL at 09:11

## 2023-12-21 RX ADMIN — FOLIC ACID 1 MG: 1 TABLET ORAL at 18:13

## 2023-12-21 RX ADMIN — PANTOPRAZOLE SODIUM 40 MG: 40 TABLET, DELAYED RELEASE ORAL at 09:11

## 2023-12-21 RX ADMIN — THIAMINE HCL TAB 100 MG 100 MG: 100 TAB at 09:11

## 2023-12-21 RX ADMIN — GABAPENTIN 100 MG: 100 CAPSULE ORAL at 09:11

## 2023-12-21 RX ADMIN — FUROSEMIDE 40 MG: 40 TABLET ORAL at 09:11

## 2023-12-21 RX ADMIN — GABAPENTIN 100 MG: 100 CAPSULE ORAL at 15:37

## 2023-12-21 RX ADMIN — SODIUM CHLORIDE 50 MG: 9 INJECTION, SOLUTION INTRAVENOUS at 21:29

## 2023-12-21 RX ADMIN — GABAPENTIN 100 MG: 100 CAPSULE ORAL at 21:29

## 2023-12-21 RX ADMIN — SODIUM CHLORIDE 50 MG: 9 INJECTION, SOLUTION INTRAVENOUS at 10:22

## 2023-12-21 RX ADMIN — TRAMADOL HYDROCHLORIDE 50 MG: 50 TABLET ORAL at 15:37

## 2023-12-21 RX ADMIN — LORAZEPAM 1 MG: 1 TABLET ORAL at 09:10

## 2023-12-21 RX ADMIN — SODIUM CHLORIDE, PRESERVATIVE FREE 10 ML: 5 INJECTION INTRAVENOUS at 09:12

## 2023-12-21 RX ADMIN — POTASSIUM CHLORIDE 20 MEQ: 1500 TABLET, EXTENDED RELEASE ORAL at 09:11

## 2023-12-21 RX ADMIN — HYDRALAZINE HYDROCHLORIDE 5 MG: 20 INJECTION, SOLUTION INTRAMUSCULAR; INTRAVENOUS at 18:13

## 2023-12-21 RX ADMIN — LORAZEPAM 2 MG: 1 TABLET ORAL at 14:32

## 2023-12-21 RX ADMIN — SODIUM CHLORIDE, PRESERVATIVE FREE 10 ML: 5 INJECTION INTRAVENOUS at 21:29

## 2023-12-21 RX ADMIN — LORAZEPAM 2 MG: 1 TABLET ORAL at 13:14

## 2023-12-21 RX ADMIN — Medication 400 MG: at 09:11

## 2023-12-21 NOTE — PLAN OF CARE
Problem: Discharge Planning  Goal: Discharge to home or other facility with appropriate resources  Outcome: Progressing  Flowsheets (Taken 12/20/2023 1826 by Ray Centeno RN)  Discharge to home or other facility with appropriate resources:   Identify barriers to discharge with patient and caregiver   Arrange for needed discharge resources and transportation as appropriate     Problem: Pain  Goal: Verbalizes/displays adequate comfort level or baseline comfort level  Outcome: Progressing     Problem: Safety - Adult  Goal: Free from fall injury  Outcome: Progressing     Problem: Skin/Tissue Integrity  Goal: Absence of new skin breakdown  Description: 1. Monitor for areas of redness and/or skin breakdown  2. Assess vascular access sites hourly  3. Every 4-6 hours minimum:  Change oxygen saturation probe site  4. Every 4-6 hours:  If on nasal continuous positive airway pressure, respiratory therapy assess nares and determine need for appliance change or resting period.   Outcome: Progressing

## 2023-12-21 NOTE — CARE COORDINATION
CM reviewed Pt medicals, received an order for IRF vs SNF. Pt does not have SNF benefits. Will meet with Pt to discuss IRF. Will need PT/OT recommendation.

## 2023-12-22 ENCOUNTER — APPOINTMENT (OUTPATIENT)
Facility: HOSPITAL | Age: 58
DRG: 720 | End: 2023-12-22
Payer: MEDICAID

## 2023-12-22 LAB
ALBUMIN SERPL-MCNC: 2.1 G/DL (ref 3.5–5)
ALBUMIN/GLOB SERPL: 0.6 (ref 1.1–2.2)
ALP SERPL-CCNC: 303 U/L (ref 45–117)
ALT SERPL-CCNC: 60 U/L (ref 12–78)
ANION GAP SERPL CALC-SCNC: 10 MMOL/L (ref 5–15)
AST SERPL W P-5'-P-CCNC: 160 U/L (ref 15–37)
BASOPHILS # BLD: 0 K/UL (ref 0–0.1)
BASOPHILS NFR BLD: 0 % (ref 0–1)
BILIRUB SERPL-MCNC: 2 MG/DL (ref 0.2–1)
BUN SERPL-MCNC: 7 MG/DL (ref 6–20)
BUN/CREAT SERPL: 19 (ref 12–20)
CA-I BLD-MCNC: 8.2 MG/DL (ref 8.5–10.1)
CHLORIDE SERPL-SCNC: 98 MMOL/L (ref 97–108)
CO2 SERPL-SCNC: 28 MMOL/L (ref 21–32)
CREAT SERPL-MCNC: 0.36 MG/DL (ref 0.7–1.3)
CRP SERPL-MCNC: 4.33 MG/DL (ref 0–0.6)
DIFFERENTIAL METHOD BLD: ABNORMAL
ECHO BSA: 1.99 M2
EOSINOPHIL # BLD: 0 K/UL (ref 0–0.4)
EOSINOPHIL NFR BLD: 0 % (ref 0–7)
ERYTHROCYTE [DISTWIDTH] IN BLOOD BY AUTOMATED COUNT: 16.9 % (ref 11.5–14.5)
GLOBULIN SER CALC-MCNC: 3.4 G/DL (ref 2–4)
GLUCOSE SERPL-MCNC: 95 MG/DL (ref 65–100)
HAV AB SER QL IA: POSITIVE
HAV IGM SERPL QL IA: NEGATIVE
HBV CORE AB SERPL QL IA: NEGATIVE
HBV CORE IGM SERPL QL IA: NEGATIVE
HBV SURFACE AB SER QL: REACTIVE INDEX VALUE
HBV SURFACE AG SERPL QL IA: NEGATIVE
HCT VFR BLD AUTO: 30.3 % (ref 36.6–50.3)
HCV AB SERPL QL IA: NORMAL
HCV IGG SERPL QL IA: NON REACTIVE
HGB BLD-MCNC: 10.3 G/DL (ref 12.1–17)
HSV1 IGG SER IA-ACNC: 21 INDEX (ref 0–0.9)
HSV2 IGG SER IA-ACNC: 5.69 INDEX (ref 0–0.9)
IMM GRANULOCYTES # BLD AUTO: 0.1 K/UL (ref 0–0.04)
IMM GRANULOCYTES NFR BLD AUTO: 1 % (ref 0–0.5)
LYMPHOCYTES # BLD: 2.5 K/UL (ref 0.8–3.5)
LYMPHOCYTES NFR BLD: 21 % (ref 12–49)
MCH RBC QN AUTO: 35.5 PG (ref 26–34)
MCHC RBC AUTO-ENTMCNC: 34 G/DL (ref 30–36.5)
MCV RBC AUTO: 104.5 FL (ref 80–99)
MONOCYTES # BLD: 1.5 K/UL (ref 0–1)
MONOCYTES NFR BLD: 12 % (ref 5–13)
NEUTS SEG # BLD: 8.1 K/UL (ref 1.8–8)
NEUTS SEG NFR BLD: 66 % (ref 32–75)
NRBC # BLD: 0.02 K/UL (ref 0–0.01)
NRBC BLD-RTO: 0.2 PER 100 WBC
PLATELET # BLD AUTO: 244 K/UL (ref 150–400)
PMV BLD AUTO: 9.5 FL (ref 8.9–12.9)
POTASSIUM SERPL-SCNC: 3.3 MMOL/L (ref 3.5–5.1)
PROCALCITONIN SERPL-MCNC: 2.44 NG/ML
PROT SERPL-MCNC: 5.5 G/DL (ref 6.4–8.2)
RBC # BLD AUTO: 2.9 M/UL (ref 4.1–5.7)
SODIUM SERPL-SCNC: 136 MMOL/L (ref 136–145)
VAS LEFT ARM BP: 180 MMHG
VAS LEFT TBI: 0.85
VAS LEFT TOE PRESSURE: 168 MMHG
VAS RIGHT ARM BP: 197 MMHG
VAS RIGHT TBI: 0.98
VAS RIGHT TOE PRESSURE: 193 MMHG
WBC # BLD AUTO: 12.2 K/UL (ref 4.1–11.1)

## 2023-12-22 PROCEDURE — 6360000004 HC RX CONTRAST MEDICATION: Performed by: INTERNAL MEDICINE

## 2023-12-22 PROCEDURE — 6370000000 HC RX 637 (ALT 250 FOR IP)

## 2023-12-22 PROCEDURE — 6360000002 HC RX W HCPCS

## 2023-12-22 PROCEDURE — 86140 C-REACTIVE PROTEIN: CPT

## 2023-12-22 PROCEDURE — 6360000002 HC RX W HCPCS: Performed by: INTERNAL MEDICINE

## 2023-12-22 PROCEDURE — 73701 CT LOWER EXTREMITY W/DYE: CPT

## 2023-12-22 PROCEDURE — 84145 PROCALCITONIN (PCT): CPT

## 2023-12-22 PROCEDURE — 2580000003 HC RX 258: Performed by: INTERNAL MEDICINE

## 2023-12-22 PROCEDURE — 36415 COLL VENOUS BLD VENIPUNCTURE: CPT

## 2023-12-22 PROCEDURE — 85025 COMPLETE CBC W/AUTO DIFF WBC: CPT

## 2023-12-22 PROCEDURE — 80053 COMPREHEN METABOLIC PANEL: CPT

## 2023-12-22 PROCEDURE — 99232 SBSQ HOSP IP/OBS MODERATE 35: CPT | Performed by: INTERNAL MEDICINE

## 2023-12-22 PROCEDURE — 93922 UPR/L XTREMITY ART 2 LEVELS: CPT | Performed by: SURGERY

## 2023-12-22 PROCEDURE — 2580000003 HC RX 258

## 2023-12-22 PROCEDURE — 1100000000 HC RM PRIVATE

## 2023-12-22 RX ADMIN — TRAMADOL HYDROCHLORIDE 50 MG: 50 TABLET ORAL at 01:20

## 2023-12-22 RX ADMIN — IOPAMIDOL 100 ML: 755 INJECTION, SOLUTION INTRAVENOUS at 23:09

## 2023-12-22 RX ADMIN — GABAPENTIN 100 MG: 100 CAPSULE ORAL at 23:25

## 2023-12-22 RX ADMIN — LORAZEPAM 3 MG: 1 TABLET ORAL at 02:14

## 2023-12-22 RX ADMIN — SODIUM CHLORIDE 50 MG: 9 INJECTION, SOLUTION INTRAVENOUS at 10:26

## 2023-12-22 RX ADMIN — PANTOPRAZOLE SODIUM 40 MG: 40 TABLET, DELAYED RELEASE ORAL at 06:12

## 2023-12-22 RX ADMIN — SODIUM CHLORIDE 50 MG: 9 INJECTION, SOLUTION INTRAVENOUS at 23:25

## 2023-12-22 RX ADMIN — SODIUM CHLORIDE, PRESERVATIVE FREE 10 ML: 5 INJECTION INTRAVENOUS at 23:25

## 2023-12-22 RX ADMIN — LORAZEPAM 1 MG: 2 INJECTION INTRAMUSCULAR; INTRAVENOUS at 23:25

## 2023-12-22 NOTE — WOUND CARE
Wound Care Note:      Wound Care into see patient because of blisters to legs    Patient resting in bed, seems confused. Erythema and edema noted to bilateral legs with a couple dry scabs noted, possibly from previous blisters. Areas appear stable with no drainage noted at this time. Recommend leaving areas open to air and elevating legs on 2-3 pillows while in bed. Recommend applying sacral foam dressing to sacrum for protection. Dressing to be changed every 3 days and PRN for soiling, nurse to peel back dressing daily for skin assessment. Float heels on pillow at all times while in bed, place pillow long ways under patient leg so knee is supported and heel is hanging off edge of pillow. Patient is to be turned approximately every 2 hours with wedge/pillows at an angle of 30 degrees or more if can be tolerated and not contraindicated. FOB should be elevated to prevent friction/shear from patient sliding down in bed. HOB should be elevated to 30 degrees or less to assist with offloading and pressure reduction to sacrum/coccyx.         Skin Care & Pressure Relief Recommendations:  Minimize layers of linen  Pads under patient to optimize support surface and microclimate  Turn/Reposition approximately every 2 hours  Pillow/Wedge  Manage Incontinence  Promote Continence; Skin Protective lotion to buttocks and sacrum daily and as needed with incontinence care  Offload heels with pillows or offloading boots

## 2023-12-23 PROBLEM — R09.89 SUSPECTED DEEP VEIN THROMBOSIS (DVT): Status: ACTIVE | Noted: 2023-12-23

## 2023-12-23 LAB
ALBUMIN SERPL-MCNC: 2.1 G/DL (ref 3.5–5)
ALBUMIN/GLOB SERPL: 0.7 (ref 1.1–2.2)
ALP SERPL-CCNC: 272 U/L (ref 45–117)
ALT SERPL-CCNC: 44 U/L (ref 12–78)
ANA SER QL: NEGATIVE
ANION GAP SERPL CALC-SCNC: 10 MMOL/L (ref 5–15)
ASO AB SERPL-ACNC: 26 IU/ML (ref 0–200)
AST SERPL W P-5'-P-CCNC: 77 U/L (ref 15–37)
BASOPHILS # BLD: 0 K/UL (ref 0–0.1)
BASOPHILS NFR BLD: 0 % (ref 0–1)
BILIRUB SERPL-MCNC: 1.6 MG/DL (ref 0.2–1)
BUN SERPL-MCNC: 8 MG/DL (ref 6–20)
BUN/CREAT SERPL: 22 (ref 12–20)
CA-I BLD-MCNC: 8.4 MG/DL (ref 8.5–10.1)
CHLORIDE SERPL-SCNC: 99 MMOL/L (ref 97–108)
CO2 SERPL-SCNC: 28 MMOL/L (ref 21–32)
CREAT SERPL-MCNC: 0.37 MG/DL (ref 0.7–1.3)
DIFFERENTIAL METHOD BLD: ABNORMAL
EOSINOPHIL # BLD: 0 K/UL (ref 0–0.4)
EOSINOPHIL NFR BLD: 0 % (ref 0–7)
ERYTHROCYTE [DISTWIDTH] IN BLOOD BY AUTOMATED COUNT: 17.4 % (ref 11.5–14.5)
GLOBULIN SER CALC-MCNC: 3.1 G/DL (ref 2–4)
GLUCOSE SERPL-MCNC: 104 MG/DL (ref 65–100)
HCT VFR BLD AUTO: 28.6 % (ref 36.6–50.3)
HGB BLD-MCNC: 9.9 G/DL (ref 12.1–17)
IMM GRANULOCYTES # BLD AUTO: 0.1 K/UL (ref 0–0.04)
IMM GRANULOCYTES NFR BLD AUTO: 1 % (ref 0–0.5)
LYMPHOCYTES # BLD: 1.9 K/UL (ref 0.8–3.5)
LYMPHOCYTES NFR BLD: 20 % (ref 12–49)
MCH RBC QN AUTO: 36 PG (ref 26–34)
MCHC RBC AUTO-ENTMCNC: 34.6 G/DL (ref 30–36.5)
MCV RBC AUTO: 104 FL (ref 80–99)
MONOCYTES # BLD: 1.3 K/UL (ref 0–1)
MONOCYTES NFR BLD: 13 % (ref 5–13)
NEUTS SEG # BLD: 6.4 K/UL (ref 1.8–8)
NEUTS SEG NFR BLD: 66 % (ref 32–75)
NRBC # BLD: 0 K/UL (ref 0–0.01)
NRBC BLD-RTO: 0 PER 100 WBC
PLATELET # BLD AUTO: 254 K/UL (ref 150–400)
PMV BLD AUTO: 9.6 FL (ref 8.9–12.9)
POTASSIUM SERPL-SCNC: 3 MMOL/L (ref 3.5–5.1)
PROCALCITONIN SERPL-MCNC: 0.98 NG/ML
PROT SERPL-MCNC: 5.2 G/DL (ref 6.4–8.2)
RBC # BLD AUTO: 2.75 M/UL (ref 4.1–5.7)
RPR SER QL: NON REACTIVE
SODIUM SERPL-SCNC: 137 MMOL/L (ref 136–145)
STREP DNASE B SER-ACNC: 117 U/ML (ref 0–120)
WBC # BLD AUTO: 9.8 K/UL (ref 4.1–11.1)

## 2023-12-23 PROCEDURE — 6370000000 HC RX 637 (ALT 250 FOR IP): Performed by: HOSPITALIST

## 2023-12-23 PROCEDURE — 2580000003 HC RX 258: Performed by: INTERNAL MEDICINE

## 2023-12-23 PROCEDURE — 80053 COMPREHEN METABOLIC PANEL: CPT

## 2023-12-23 PROCEDURE — 6370000000 HC RX 637 (ALT 250 FOR IP)

## 2023-12-23 PROCEDURE — 85025 COMPLETE CBC W/AUTO DIFF WBC: CPT

## 2023-12-23 PROCEDURE — 6360000002 HC RX W HCPCS: Performed by: STUDENT IN AN ORGANIZED HEALTH CARE EDUCATION/TRAINING PROGRAM

## 2023-12-23 PROCEDURE — 1100000000 HC RM PRIVATE

## 2023-12-23 PROCEDURE — 84145 PROCALCITONIN (PCT): CPT

## 2023-12-23 PROCEDURE — 86160 COMPLEMENT ANTIGEN: CPT

## 2023-12-23 PROCEDURE — 2580000003 HC RX 258

## 2023-12-23 PROCEDURE — 99232 SBSQ HOSP IP/OBS MODERATE 35: CPT | Performed by: INTERNAL MEDICINE

## 2023-12-23 PROCEDURE — 36415 COLL VENOUS BLD VENIPUNCTURE: CPT

## 2023-12-23 PROCEDURE — 6360000002 HC RX W HCPCS: Performed by: INTERNAL MEDICINE

## 2023-12-23 RX ORDER — TRAZODONE HYDROCHLORIDE 50 MG/1
100 TABLET ORAL NIGHTLY PRN
Status: DISCONTINUED | OUTPATIENT
Start: 2023-12-23 | End: 2023-12-24 | Stop reason: HOSPADM

## 2023-12-23 RX ORDER — POTASSIUM CHLORIDE 7.45 MG/ML
10 INJECTION INTRAVENOUS
Status: DISPENSED | OUTPATIENT
Start: 2023-12-23 | End: 2023-12-23

## 2023-12-23 RX ADMIN — Medication 400 MG: at 09:28

## 2023-12-23 RX ADMIN — FOLIC ACID 1 MG: 1 TABLET ORAL at 09:28

## 2023-12-23 RX ADMIN — PANTOPRAZOLE SODIUM 40 MG: 40 TABLET, DELAYED RELEASE ORAL at 05:57

## 2023-12-23 RX ADMIN — SODIUM CHLORIDE 50 MG: 9 INJECTION, SOLUTION INTRAVENOUS at 10:54

## 2023-12-23 RX ADMIN — GABAPENTIN 100 MG: 100 CAPSULE ORAL at 20:30

## 2023-12-23 RX ADMIN — TRAZODONE HYDROCHLORIDE 100 MG: 50 TABLET ORAL at 23:21

## 2023-12-23 RX ADMIN — POTASSIUM CHLORIDE 10 MEQ: 7.46 INJECTION, SOLUTION INTRAVENOUS at 13:59

## 2023-12-23 RX ADMIN — TRAMADOL HYDROCHLORIDE 50 MG: 50 TABLET ORAL at 04:54

## 2023-12-23 RX ADMIN — GABAPENTIN 100 MG: 100 CAPSULE ORAL at 13:59

## 2023-12-23 RX ADMIN — SODIUM CHLORIDE, PRESERVATIVE FREE 10 ML: 5 INJECTION INTRAVENOUS at 20:30

## 2023-12-23 RX ADMIN — THIAMINE HCL TAB 100 MG 100 MG: 100 TAB at 09:28

## 2023-12-23 RX ADMIN — POTASSIUM CHLORIDE 10 MEQ: 7.46 INJECTION, SOLUTION INTRAVENOUS at 09:28

## 2023-12-23 RX ADMIN — POTASSIUM CHLORIDE 20 MEQ: 1500 TABLET, EXTENDED RELEASE ORAL at 09:28

## 2023-12-23 RX ADMIN — GABAPENTIN 100 MG: 100 CAPSULE ORAL at 09:28

## 2023-12-23 RX ADMIN — SODIUM CHLORIDE, PRESERVATIVE FREE 10 ML: 5 INJECTION INTRAVENOUS at 09:28

## 2023-12-23 RX ADMIN — TRAMADOL HYDROCHLORIDE 50 MG: 50 TABLET ORAL at 20:34

## 2023-12-24 VITALS
DIASTOLIC BLOOD PRESSURE: 69 MMHG | OXYGEN SATURATION: 96 % | WEIGHT: 185 LBS | HEIGHT: 67 IN | RESPIRATION RATE: 12 BRPM | TEMPERATURE: 97.9 F | SYSTOLIC BLOOD PRESSURE: 114 MMHG | HEART RATE: 93 BPM | BODY MASS INDEX: 29.03 KG/M2

## 2023-12-24 LAB
ALBUMIN SERPL-MCNC: 2 G/DL (ref 3.5–5)
ALBUMIN/GLOB SERPL: 0.7 (ref 1.1–2.2)
ALP SERPL-CCNC: 256 U/L (ref 45–117)
ALT SERPL-CCNC: 36 U/L (ref 12–78)
ANION GAP SERPL CALC-SCNC: 8 MMOL/L (ref 5–15)
AST SERPL W P-5'-P-CCNC: 63 U/L (ref 15–37)
BASOPHILS # BLD: 0 K/UL (ref 0–0.1)
BASOPHILS NFR BLD: 0 % (ref 0–1)
BILIRUB SERPL-MCNC: 1.4 MG/DL (ref 0.2–1)
BUN SERPL-MCNC: 10 MG/DL (ref 6–20)
BUN/CREAT SERPL: 26 (ref 12–20)
C3 SERPL-MCNC: 126 MG/DL (ref 82–167)
C4 SERPL-MCNC: 22 MG/DL (ref 12–38)
CA-I BLD-MCNC: 8.3 MG/DL (ref 8.5–10.1)
CHLORIDE SERPL-SCNC: 102 MMOL/L (ref 97–108)
CO2 SERPL-SCNC: 28 MMOL/L (ref 21–32)
CREAT SERPL-MCNC: 0.39 MG/DL (ref 0.7–1.3)
CRP SERPL-MCNC: 2.56 MG/DL (ref 0–0.6)
DIFFERENTIAL METHOD BLD: ABNORMAL
EOSINOPHIL # BLD: 0.2 K/UL (ref 0–0.4)
EOSINOPHIL NFR BLD: 2 % (ref 0–7)
ERYTHROCYTE [DISTWIDTH] IN BLOOD BY AUTOMATED COUNT: 18.4 % (ref 11.5–14.5)
GLOBULIN SER CALC-MCNC: 3 G/DL (ref 2–4)
GLUCOSE SERPL-MCNC: 101 MG/DL (ref 65–100)
HCT VFR BLD AUTO: 28.2 % (ref 36.6–50.3)
HGB BLD-MCNC: 9.6 G/DL (ref 12.1–17)
IMM GRANULOCYTES # BLD AUTO: 0.1 K/UL (ref 0–0.04)
IMM GRANULOCYTES NFR BLD AUTO: 1 % (ref 0–0.5)
LYMPHOCYTES # BLD: 2.4 K/UL (ref 0.8–3.5)
LYMPHOCYTES NFR BLD: 26 % (ref 12–49)
MCH RBC QN AUTO: 36.2 PG (ref 26–34)
MCHC RBC AUTO-ENTMCNC: 34 G/DL (ref 30–36.5)
MCV RBC AUTO: 106.4 FL (ref 80–99)
MONOCYTES # BLD: 1.3 K/UL (ref 0–1)
MONOCYTES NFR BLD: 14 % (ref 5–13)
NEUTS SEG # BLD: 5.2 K/UL (ref 1.8–8)
NEUTS SEG NFR BLD: 57 % (ref 32–75)
NRBC # BLD: 0 K/UL (ref 0–0.01)
NRBC BLD-RTO: 0 PER 100 WBC
PLATELET # BLD AUTO: 249 K/UL (ref 150–400)
PMV BLD AUTO: 10 FL (ref 8.9–12.9)
POTASSIUM SERPL-SCNC: 3.4 MMOL/L (ref 3.5–5.1)
PROCALCITONIN SERPL-MCNC: 0.7 NG/ML
PROT SERPL-MCNC: 5 G/DL (ref 6.4–8.2)
RBC # BLD AUTO: 2.65 M/UL (ref 4.1–5.7)
SODIUM SERPL-SCNC: 138 MMOL/L (ref 136–145)
WBC # BLD AUTO: 9.2 K/UL (ref 4.1–11.1)

## 2023-12-24 PROCEDURE — 84145 PROCALCITONIN (PCT): CPT

## 2023-12-24 PROCEDURE — 97530 THERAPEUTIC ACTIVITIES: CPT

## 2023-12-24 PROCEDURE — 86140 C-REACTIVE PROTEIN: CPT

## 2023-12-24 PROCEDURE — 85025 COMPLETE CBC W/AUTO DIFF WBC: CPT

## 2023-12-24 PROCEDURE — 6370000000 HC RX 637 (ALT 250 FOR IP): Performed by: INTERNAL MEDICINE

## 2023-12-24 PROCEDURE — 36415 COLL VENOUS BLD VENIPUNCTURE: CPT

## 2023-12-24 PROCEDURE — 97161 PT EVAL LOW COMPLEX 20 MIN: CPT

## 2023-12-24 PROCEDURE — 6370000000 HC RX 637 (ALT 250 FOR IP)

## 2023-12-24 PROCEDURE — 80053 COMPREHEN METABOLIC PANEL: CPT

## 2023-12-24 RX ORDER — DOXYCYCLINE HYCLATE 100 MG/1
100 CAPSULE ORAL EVERY 12 HOURS SCHEDULED
Status: DISCONTINUED | OUTPATIENT
Start: 2023-12-24 | End: 2023-12-24 | Stop reason: HOSPADM

## 2023-12-24 RX ORDER — DOXYCYCLINE HYCLATE 100 MG/1
100 CAPSULE ORAL EVERY 12 HOURS SCHEDULED
Qty: 14 CAPSULE | Refills: 0 | Status: SHIPPED | OUTPATIENT
Start: 2023-12-24 | End: 2023-12-31

## 2023-12-24 RX ADMIN — GABAPENTIN 100 MG: 100 CAPSULE ORAL at 09:08

## 2023-12-24 RX ADMIN — Medication 400 MG: at 09:09

## 2023-12-24 RX ADMIN — DOXYCYCLINE HYCLATE 100 MG: 100 CAPSULE ORAL at 09:07

## 2023-12-24 RX ADMIN — PANTOPRAZOLE SODIUM 40 MG: 40 TABLET, DELAYED RELEASE ORAL at 06:01

## 2023-12-24 RX ADMIN — ACETAMINOPHEN 650 MG: 325 TABLET ORAL at 09:06

## 2023-12-24 RX ADMIN — POTASSIUM CHLORIDE 20 MEQ: 1500 TABLET, EXTENDED RELEASE ORAL at 09:09

## 2023-12-24 RX ADMIN — FOLIC ACID 1 MG: 1 TABLET ORAL at 09:09

## 2023-12-24 RX ADMIN — THIAMINE HCL TAB 100 MG 100 MG: 100 TAB at 09:06

## 2023-12-24 NOTE — CARE COORDINATION
Discharge Noted:  Patient will need to see PT/OT before discharge. Possible discharge after PT/OT sees patient . Nurse called PT to confirm. PT stated they will assess patient today. CM will continue to follow. CM met with patient to inquire about HH. Patient states he has [facility] but does not recall the name of agency. CM also mentioned that it may be a possibility that PT/OT may recommend SNF or IRF. Patient stated that he wants to do rehab at home. CM will continue to follow. PT states patient can potentially go home today.

## 2023-12-26 LAB
BACTERIA SPEC CULT: NORMAL
BACTERIA SPEC CULT: NORMAL
Lab: NORMAL
Lab: NORMAL
VZV IGG SER IA-ACNC: 1413 INDEX
VZV IGM SER IA-ACNC: <0.91 INDEX (ref 0–0.9)

## 2023-12-28 ENCOUNTER — TRANSCRIBE ORDERS (OUTPATIENT)
Facility: HOSPITAL | Age: 58
End: 2023-12-28

## 2023-12-28 DIAGNOSIS — R06.02 SOB (SHORTNESS OF BREATH): Primary | ICD-10-CM

## 2024-01-12 ENCOUNTER — HOSPITAL ENCOUNTER (EMERGENCY)
Facility: HOSPITAL | Age: 59
Discharge: HOME | End: 2024-01-12
Payer: MEDICAID

## 2024-01-12 ENCOUNTER — TELEPHONE (OUTPATIENT)
Facility: CLINIC | Age: 59
End: 2024-01-12

## 2024-01-12 VITALS
OXYGEN SATURATION: 99 % | DIASTOLIC BLOOD PRESSURE: 72 MMHG | BODY MASS INDEX: 28.25 KG/M2 | HEIGHT: 67 IN | WEIGHT: 180 LBS | TEMPERATURE: 98.1 F | HEART RATE: 98 BPM | SYSTOLIC BLOOD PRESSURE: 110 MMHG | RESPIRATION RATE: 18 BRPM

## 2024-01-12 DIAGNOSIS — M79.605 CHRONIC PAIN OF BOTH LOWER EXTREMITIES: Primary | ICD-10-CM

## 2024-01-12 DIAGNOSIS — M79.604 CHRONIC PAIN OF BOTH LOWER EXTREMITIES: Primary | ICD-10-CM

## 2024-01-12 DIAGNOSIS — G89.29 CHRONIC PAIN OF BOTH LOWER EXTREMITIES: Primary | ICD-10-CM

## 2024-01-12 PROCEDURE — 99283 EMERGENCY DEPT VISIT LOW MDM: CPT

## 2024-01-12 PROCEDURE — 6370000000 HC RX 637 (ALT 250 FOR IP)

## 2024-01-12 RX ORDER — TRAMADOL HYDROCHLORIDE 50 MG/1
50 TABLET ORAL EVERY 8 HOURS PRN
Qty: 9 TABLET | Refills: 0 | Status: SHIPPED | OUTPATIENT
Start: 2024-01-12 | End: 2024-01-15

## 2024-01-12 RX ORDER — TRAMADOL HYDROCHLORIDE 50 MG/1
50 TABLET ORAL ONCE
Status: COMPLETED | OUTPATIENT
Start: 2024-01-12 | End: 2024-01-12

## 2024-01-12 RX ADMIN — TRAMADOL HYDROCHLORIDE 50 MG: 50 TABLET ORAL at 11:58

## 2024-01-12 ASSESSMENT — PAIN DESCRIPTION - DESCRIPTORS: DESCRIPTORS: ACHING

## 2024-01-12 ASSESSMENT — PAIN - FUNCTIONAL ASSESSMENT
PAIN_FUNCTIONAL_ASSESSMENT: 0-10
PAIN_FUNCTIONAL_ASSESSMENT: ACTIVITIES ARE NOT PREVENTED
PAIN_FUNCTIONAL_ASSESSMENT: ADULT NONVERBAL PAIN SCALE (NPVS)

## 2024-01-12 ASSESSMENT — PAIN SCALES - GENERAL
PAINLEVEL_OUTOF10: 0
PAINLEVEL_OUTOF10: 10

## 2024-01-12 ASSESSMENT — PAIN DESCRIPTION - ORIENTATION: ORIENTATION: RIGHT;LEFT

## 2024-01-12 ASSESSMENT — PAIN DESCRIPTION - LOCATION: LOCATION: LEG

## 2024-01-12 NOTE — TELEPHONE ENCOUNTER
Patient called. He just left [facility] ER. He stated he has chronic pain and the ER did not do anything but give him 3 Tramadol pills. He is on his way to [facility] and is asking for more Tramadol to be sent in. Patient was advised that he may need to be seen. Please advise.

## 2024-01-12 NOTE — ED PROVIDER NOTES
[facility] EMERGENCY DEPT  EMERGENCY DEPARTMENT HISTORY AND PHYSICAL EXAM      Date: 1/12/2024  Patient Name: Checo Dey  MRN: [id number]  YOB: 1965  Date of evaluation: 1/12/2024  Provider: MILAN Hanson NP   Note Started: 12:06 PM EST 1/12/24    HISTORY OF PRESENT ILLNESS     Chief Complaint   Patient presents with    Leg Pain       History Provided By: Patient    HPI: Checo Dey is a 58 y.o. male with past medical history as listed below, presents emergency department via EMS with complaints of chronic bilateral lower extremity pain.  Patient states this is the same pain he has been having for years, related to his neuropathy.  He takes gabapentin 3 times daily but states this does not work for him.  He has not yet spoken to his primary care provider, who manages his pain.  Denies any new symptoms, chest pain, difficulty breathing, and is currently in his usual state of health. Upon arrival to the ED pt is alert and oriented x 3, well-appearing, and interacting appropriately; no obvious distress noted.      PAST MEDICAL HISTORY   Past Medical History:  Past Medical History:   Diagnosis Date    Bilateral lower extremity edema     Gastrointestinal disorder     Hypertension        Past Surgical History:  History reviewed. No pertinent surgical history.    Family History:  History reviewed. No pertinent family history.    Social History:  Social History     Tobacco Use    Smoking status: Every Day     Current packs/day: 0.50     Types: Cigarettes    Smokeless tobacco: Never   Substance Use Topics    Alcohol use: Yes     Comment: 1-2 drinks per day    Drug use: Never       Allergies:  No Known Allergies    PCP: Rupinder Barajas PA-C    Current Meds:   Current Facility-Administered Medications   Medication Dose Route Frequency Provider Last Rate Last Admin    sodium bicarbonate 8.4 % injection              Current Outpatient Medications   Medication Sig Dispense Refill    traMADol (ULTRAM) 50 MG

## 2024-01-12 NOTE — ED TRIAGE NOTES
Patient here with c/o leg pain from thighs down, states having blisters to feet per EMS. Patient refusing to answer nurses questions.

## 2024-01-13 ENCOUNTER — TELEPHONE (OUTPATIENT)
Age: 59
End: 2024-01-13

## 2024-01-25 ENCOUNTER — NURSE TRIAGE (OUTPATIENT)
Dept: OTHER | Facility: CLINIC | Age: 59
End: 2024-01-25

## 2024-01-25 NOTE — TELEPHONE ENCOUNTER
[place]    Received call from Kamilla at [facility]; Patient with Red Flag Complaint requesting to establish care with [facility].    Current Symptoms: pain, tingling and numbness in bilateral legs, ankles and feet    Also reports blisters on bilateral legs and feet    Seen in ED 1/12/2024, no new or worsening symptoms since last ED evaluation    Onset: States - \"Quite some time\" declined to give specific time frame    Pain Severity: 10/10    Temperature: denies     What has been tried: lotion    Recommended disposition: See in Office Within 2 Weeks    Care advice provided, patient verbalizes understanding; denies any other questions or concerns; instructed to call back for any new or worsening symptoms.    Writer provided warm transfer to Kathia at [facility]  for new patient appointment scheduling.    Attention Provider:  Thank you for allowing me to participate in the care of your patient.  The patient was connected to triage in response to information provided to the [facility].  Please do not respond through this encounter as the response is not directed to a shared pool.      Reason for Disposition   Nursing judgment or information in reference    Protocols used: No Guideline Available-ADULT-

## 2024-01-27 ENCOUNTER — HOSPITAL ENCOUNTER (EMERGENCY)
Facility: HOSPITAL | Age: 59
Discharge: HOME | End: 2024-01-27
Attending: EMERGENCY MEDICINE
Payer: MEDICAID

## 2024-01-27 VITALS
OXYGEN SATURATION: 100 % | HEIGHT: 67 IN | BODY MASS INDEX: 29.03 KG/M2 | WEIGHT: 185 LBS | RESPIRATION RATE: 13 BRPM | SYSTOLIC BLOOD PRESSURE: 114 MMHG | DIASTOLIC BLOOD PRESSURE: 72 MMHG | TEMPERATURE: 98.7 F | HEART RATE: 105 BPM

## 2024-01-27 DIAGNOSIS — I89.0 LYMPHEDEMA: ICD-10-CM

## 2024-01-27 DIAGNOSIS — L03.119 CELLULITIS OF LOWER EXTREMITY, UNSPECIFIED LATERALITY: Primary | ICD-10-CM

## 2024-01-27 LAB
ALBUMIN SERPL-MCNC: 2.3 G/DL (ref 3.5–5)
ALBUMIN/GLOB SERPL: 0.8 (ref 1.1–2.2)
ALP SERPL-CCNC: 211 U/L (ref 45–117)
ALT SERPL-CCNC: 13 U/L (ref 12–78)
ANION GAP SERPL CALC-SCNC: 4 MMOL/L (ref 5–15)
AST SERPL W P-5'-P-CCNC: 45 U/L (ref 15–37)
BASOPHILS # BLD: 0 K/UL (ref 0–0.1)
BASOPHILS NFR BLD: 0 % (ref 0–1)
BILIRUB SERPL-MCNC: 0.6 MG/DL (ref 0.2–1)
BUN SERPL-MCNC: 6 MG/DL (ref 6–20)
BUN/CREAT SERPL: 9 (ref 12–20)
CA-I BLD-MCNC: 7.8 MG/DL (ref 8.5–10.1)
CHLORIDE SERPL-SCNC: 112 MMOL/L (ref 97–108)
CO2 SERPL-SCNC: 25 MMOL/L (ref 21–32)
CREAT SERPL-MCNC: 0.65 MG/DL (ref 0.7–1.3)
DIFFERENTIAL METHOD BLD: ABNORMAL
EOSINOPHIL # BLD: 0.3 K/UL (ref 0–0.4)
EOSINOPHIL NFR BLD: 3 % (ref 0–7)
ERYTHROCYTE [DISTWIDTH] IN BLOOD BY AUTOMATED COUNT: 15.8 % (ref 11.5–14.5)
GLOBULIN SER CALC-MCNC: 2.8 G/DL (ref 2–4)
GLUCOSE SERPL-MCNC: 99 MG/DL (ref 65–100)
HCT VFR BLD AUTO: 30.8 % (ref 36.6–50.3)
HGB BLD-MCNC: 10.4 G/DL (ref 12.1–17)
IMM GRANULOCYTES # BLD AUTO: 0 K/UL
IMM GRANULOCYTES NFR BLD AUTO: 0 %
LYMPHOCYTES # BLD: 2.9 K/UL (ref 0.8–3.5)
LYMPHOCYTES NFR BLD: 30 % (ref 12–49)
MCH RBC QN AUTO: 34.2 PG (ref 26–34)
MCHC RBC AUTO-ENTMCNC: 33.8 G/DL (ref 30–36.5)
MCV RBC AUTO: 101.3 FL (ref 80–99)
MONOCYTES # BLD: 0.7 K/UL (ref 0–1)
MONOCYTES NFR BLD: 7 % (ref 5–13)
NEUTS SEG # BLD: 5.8 K/UL (ref 1.8–8)
NEUTS SEG NFR BLD: 60 % (ref 32–75)
NRBC # BLD: 0 K/UL (ref 0–0.01)
NRBC BLD-RTO: 0 PER 100 WBC
PLATELET # BLD AUTO: 251 K/UL (ref 150–400)
PMV BLD AUTO: 9 FL (ref 8.9–12.9)
POTASSIUM SERPL-SCNC: 4.1 MMOL/L (ref 3.5–5.1)
PROT SERPL-MCNC: 5.1 G/DL (ref 6.4–8.2)
RBC # BLD AUTO: 3.04 M/UL (ref 4.1–5.7)
RBC MORPH BLD: ABNORMAL
SODIUM SERPL-SCNC: 141 MMOL/L (ref 136–145)
WBC # BLD AUTO: 9.7 K/UL (ref 4.1–11.1)

## 2024-01-27 PROCEDURE — 36415 COLL VENOUS BLD VENIPUNCTURE: CPT

## 2024-01-27 PROCEDURE — 87040 BLOOD CULTURE FOR BACTERIA: CPT

## 2024-01-27 PROCEDURE — 80053 COMPREHEN METABOLIC PANEL: CPT

## 2024-01-27 PROCEDURE — 99283 EMERGENCY DEPT VISIT LOW MDM: CPT

## 2024-01-27 PROCEDURE — 85025 COMPLETE CBC W/AUTO DIFF WBC: CPT

## 2024-01-27 RX ORDER — FUROSEMIDE 20 MG/1
20 TABLET ORAL DAILY
Qty: 10 TABLET | Refills: 0 | Status: ON HOLD | OUTPATIENT
Start: 2024-01-27 | End: 2024-02-06

## 2024-01-27 ASSESSMENT — PAIN SCALES - GENERAL: PAINLEVEL_OUTOF10: 10

## 2024-01-27 ASSESSMENT — PAIN - FUNCTIONAL ASSESSMENT: PAIN_FUNCTIONAL_ASSESSMENT: 0-10

## 2024-01-27 NOTE — ED TRIAGE NOTES
Bilateral lower leg edema, redness, and pain.  Believes he may have cellulitis.  Pt has history of similar episodes  and falls in the past.

## 2024-01-27 NOTE — ED PROVIDER NOTES
[facility] EMERGENCY DEPT  EMERGENCY DEPARTMENT HISTORY AND PHYSICAL EXAM      Date: 1/27/2024  Patient Name: Checo Dey  MRN: [id number]  YOB: 1965  Date of evaluation: 1/27/2024  Provider: Mehnaz Virk MD   Note Started: 1:43 PM EST 1/27/24    HISTORY OF PRESENT ILLNESS     Chief Complaint   Patient presents with    Leg Pain       History Provided By: Patient    HPI: Checo Dey is a 58-year-old male with history of low blood pressure, alcohol abuse, lymphedema here with bilateral lower extremity, pain, redness, warmth.            Patient also has a history of lymphedema and has been previously prescribed Lasix which he does not take.  He had a visit in November where he was prescribed doxycycline and Keflex.  He has a history of alcohol abuse.    Patient was then admitted to the hospital in December for lower extremity cellulitis.  He was started on tigecycline and discharged on doxycycline.  Prior to that he had been on Keflex    PAST MEDICAL HISTORY   Past Medical History:  Past Medical History:   Diagnosis Date    Bilateral lower extremity edema     Gastrointestinal disorder     Hypertension        Past Surgical History:  History reviewed. No pertinent surgical history.    Family History:  History reviewed. No pertinent family history.    Social History:  Social History     Tobacco Use    Smoking status: Every Day     Current packs/day: 0.50     Types: Cigarettes    Smokeless tobacco: Never   Substance Use Topics    Alcohol use: Yes     Comment: 1-2 drinks per day    Drug use: Never       Allergies:  Allergies   Allergen Reactions    Other      Dove Feathers       PCP: None, None    Current Meds:   No current facility-administered medications for this encounter.     Current Outpatient Medications   Medication Sig Dispense Refill    gabapentin (NEURONTIN) 100 MG capsule Take 6 capsules by mouth 3 times daily.      furosemide (LASIX) 40 MG tablet Take 1 tablet by mouth daily      midodrine      1. Cellulitis of lower extremity, unspecified laterality    2. Lymphedema          DISPOSITION/PLAN   DISPOSITION Decision To Discharge 01/27/2024 04:15:08 PM    Discharge Note: The patient is stable for discharge home. The signs, symptoms, diagnosis, and discharge instructions have been discussed, understanding conveyed, and agreed upon. The patient is to follow up as recommended or return to ER should their symptoms worsen.      PATIENT REFERRED TO:  [facility]  [address], [address] [address]            DISCHARGE MEDICATIONS:     Medication List        START taking these medications      dicloxacillin 500 MG capsule  Commonly known as: DYNAPEN  Take 1 capsule by mouth 4 times daily for 10 days            CHANGE how you take these medications      * furosemide 40 MG tablet  Commonly known as: LASIX  What changed: Another medication with the same name was added. Make sure you understand how and when to take each.     * furosemide 20 MG tablet  Commonly known as: Lasix  Take 1 tablet by mouth daily for 10 days  What changed: You were already taking a medication with the same name, and this prescription was added. Make sure you understand how and when to take each.           * This list has 2 medication(s) that are the same as other medications prescribed for you. Read the directions carefully, and ask your doctor or other care provider to review them with you.                ASK your doctor about these medications      gabapentin 100 MG capsule  Commonly known as: NEURONTIN     magnesium oxide 400 (240 Mg) MG tablet  Commonly known as: MAG-OX  Take 1 tablet by mouth daily     midodrine 2.5 MG tablet  Commonly known as: PROAMATINE  Take 1 tablet by mouth 2 times daily as needed (BP < 100)     pantoprazole 40 MG tablet  Commonly known as: PROTONIX     potassium chloride 20 MEQ extended release tablet  Commonly known as: KLOR-CON M  Take 1 tablet by mouth daily

## 2024-01-28 LAB
BACTERIA SPEC CULT: NORMAL
BACTERIA SPEC CULT: NORMAL
Lab: NORMAL
Lab: NORMAL

## 2024-01-29 NOTE — CARE COORDINATION
Entry for 1/29/24    ED called CM department and requested a CM contact pt.     1025    Cm attempted to contact pt at the following phone# [phone]. Unable to leave a voice message due to full mailbox.     161    Cm attempted to contact pt again. Cm received no answer. Still unable to leave a voice message due to full mailbox.

## 2024-01-30 ENCOUNTER — HOSPITAL ENCOUNTER (EMERGENCY)
Facility: HOSPITAL | Age: 59
Discharge: HOME | DRG: 720 | End: 2024-01-30
Attending: EMERGENCY MEDICINE
Payer: MEDICAID

## 2024-01-30 VITALS
OXYGEN SATURATION: 100 % | HEART RATE: 105 BPM | TEMPERATURE: 98.3 F | HEIGHT: 67 IN | WEIGHT: 185 LBS | DIASTOLIC BLOOD PRESSURE: 70 MMHG | RESPIRATION RATE: 16 BRPM | BODY MASS INDEX: 29.03 KG/M2 | SYSTOLIC BLOOD PRESSURE: 112 MMHG

## 2024-01-30 DIAGNOSIS — R60.0 BILATERAL LOWER EXTREMITY EDEMA: Primary | ICD-10-CM

## 2024-01-30 PROCEDURE — 99283 EMERGENCY DEPT VISIT LOW MDM: CPT

## 2024-01-30 PROCEDURE — 6370000000 HC RX 637 (ALT 250 FOR IP): Performed by: EMERGENCY MEDICINE

## 2024-01-30 RX ORDER — TRAMADOL HYDROCHLORIDE 50 MG/1
50 TABLET ORAL EVERY 6 HOURS PRN
Qty: 28 TABLET | Refills: 0 | Status: ON HOLD | OUTPATIENT
Start: 2024-01-30 | End: 2024-02-06

## 2024-01-30 RX ORDER — TRAMADOL HYDROCHLORIDE 50 MG/1
100 TABLET ORAL
Status: COMPLETED | OUTPATIENT
Start: 2024-01-30 | End: 2024-01-30

## 2024-01-30 RX ADMIN — TRAMADOL HYDROCHLORIDE 100 MG: 50 TABLET ORAL at 14:34

## 2024-01-30 ASSESSMENT — PAIN SCALES - GENERAL
PAINLEVEL_OUTOF10: 8
PAINLEVEL_OUTOF10: 8

## 2024-01-30 ASSESSMENT — LIFESTYLE VARIABLES
HOW OFTEN DO YOU HAVE A DRINK CONTAINING ALCOHOL: NEVER
HOW MANY STANDARD DRINKS CONTAINING ALCOHOL DO YOU HAVE ON A TYPICAL DAY: PATIENT DOES NOT DRINK

## 2024-01-30 ASSESSMENT — PAIN DESCRIPTION - LOCATION: LOCATION: LEG

## 2024-01-30 NOTE — ED TRIAGE NOTES
Pt arrived with complaint of leg swelling, was prescribed diuretics 3 days ago, didn't take them today.

## 2024-01-30 NOTE — ED PROVIDER NOTES
findings: Not Applicable.    Interpretation per the Radiologist below, if available at the time of this note:  No orders to display        EMERGENCY DEPARTMENT COURSE and DIFFERENTIAL DIAGNOSIS/MDM   3:01 PM DDx, ED Course, and Reassessment: Patient seen here recently for the same, now is just describing a pain complaint.  States he is compliant with his medicines though did not take the Lasix today.    Clinical Management Tools:  Not Applicable    Records Reviewed (source and summary of external notes): Prior medical records and Nursing notes    Vitals:    Vitals:    01/30/24 1237 01/30/24 1239 01/30/24 1241   BP:   112/70   Pulse:   (!) 105   Resp:   16   Temp:   98.3 °F (36.8 °C)   TempSrc:  Oral    SpO2:   100%   Weight: 83.9 kg (185 lb)     Height: 1.702 m (5' 7\")          ED COURSE       Sepsis Reassessment: Sepsis reassessment not applicable    Disposition Considerations (Tests not done, Shared Decision Making, Pt Expectation of Test or Treatment.): See MDM    Patient was given the following medications:  Medications   traMADol (ULTRAM) tablet 100 mg (100 mg Oral Given 1/30/24 1434)       CONSULTS: (Who and What was discussed)  None     Social Determinants affecting Dx or Tx: Patient lacks a PCP.    Smoking Cessation: Not Applicable    PROCEDURES   Unless otherwise noted above, none  Procedures      CRITICAL CARE TIME   Patient does not meet Critical Care Time, 0 minutes    ED FINAL IMPRESSION     1. Bilateral lower extremity edema          DISPOSITION/PLAN   DISPOSITION Decision To Discharge 01/30/2024 03:00:34 PM    Discharge Note: The patient is stable for discharge home. The signs, symptoms, diagnosis, and discharge instructions have been discussed, understanding conveyed, and agreed upon. The patient is to follow up as recommended or return to ER should their symptoms worsen.      PATIENT REFERRED TO:  Lee Marvin MD  [address]  [address]  [facility]  [id number] [phone]    Schedule an appointment as soon as possible for a visit   Or follow-up with your primary care physician.        DISCHARGE MEDICATIONS:     Medication List        START taking these medications      traMADol 50 MG tablet  Commonly known as: Ultram  Take 1 tablet by mouth every 6 hours as needed for Pain for up to 7 days. Intended supply: 3 days. Take lowest dose possible to manage pain Max Daily Amount: 200 mg            ASK your doctor about these medications      dicloxacillin 500 MG capsule  Commonly known as: DYNAPEN  Take 1 capsule by mouth 4 times daily for 10 days     * furosemide 40 MG tablet  Commonly known as: LASIX     * furosemide 20 MG tablet  Commonly known as: Lasix  Take 1 tablet by mouth daily for 10 days     gabapentin 100 MG capsule  Commonly known as: NEURONTIN     magnesium oxide 400 (240 Mg) MG tablet  Commonly known as: MAG-OX  Take 1 tablet by mouth daily     midodrine 2.5 MG tablet  Commonly known as: PROAMATINE  Take 1 tablet by mouth 2 times daily as needed (BP < 100)     pantoprazole 40 MG tablet  Commonly known as: PROTONIX     potassium chloride 20 MEQ extended release tablet  Commonly known as: KLOR-CON M  Take 1 tablet by mouth daily     vitamin B-1 100 MG tablet  Commonly known as: THIAMINE           * This list has 2 medication(s) that are the same as other medications prescribed for you. Read the directions carefully, and ask your doctor or other care provider to review them with you.                   Where to Get Your Medications        These medications were sent to [id number] - [place], [place] - [address] - P [phone] - F [phone]  [address] [place] [address]      Phone: [phone]   traMADol 50 MG tablet           DISCONTINUED MEDICATIONS:  Current Discharge Medication List          I am the Primary Clinician of Record. Marcus Elias MD (electronically signed)    (Please note that

## 2024-01-30 NOTE — DISCHARGE INSTRUCTIONS
Thank you!  Thank you for allowing me to care for you in the emergency department. It is my goal to provide you with excellent care.  Please fill out the survey that will come to you by mail or email since we listen to your feedback!     Below you will find a list of your tests from today's visit.  Should you have any questions, please do not hesitate to call the emergency department.    Labs  No results found for this or any previous visit (from the past 12 hour(s)).    Radiologic Studies  No orders to display     ------------------------------------------------------------------------------------------------------------  The exam and treatment you received in the Emergency Department were for an urgent problem and are not intended as complete care. It is important that you follow-up with a doctor, nurse practitioner, or physician assistant to:  (1) confirm your diagnosis,  (2) re-evaluation of changes in your illness and treatment, and (3) for ongoing care. Please take your discharge instructions with you when you go to your follow-up appointment.     If you have any problem arranging a follow-up appointment, contact the Emergency Department.  If your symptoms become worse or you do not improve as expected and you are unable to reach your health care provider, please return to the Emergency Department. We are available 24 hours a day.     If a prescription has been provided, please have it filled as soon as possible to prevent a delay in treatment. If you have any questions or reservations about taking the medication due to side effects or interactions with other medications, please call your primary care provider or contact the ER.         August 23, 2022      Eli Burton  [address] [facility] [address]              Dear Eli,      Reaching out on behalf of Wei Ybarra.  Our records show that you are due for a pap smear or cervical cancer screening and a physical.      Have you done one outside of [place]?  If yes, where and what was the date so we can update your chart?    If you haven't then we can help you get scheduled.  This is typically done at a physical, so we can assist scheduling a physical and pap if you are due for a physical as well.     You can schedule through [employer] or by calling [phone].    If you shouldn't have a pap anymore (full hysterectomy, etc.), then let us know as well so we can put in the proper documentation and stop asking about this.     Again please let us know if you received this service outside of [place] so we can get your chart updated or let us know if you have any questions/concerns.     Thank you for your time.  Sincerely,  Stormy ELIZALDE RN

## 2024-02-01 ENCOUNTER — APPOINTMENT (OUTPATIENT)
Facility: HOSPITAL | Age: 59
DRG: 720 | End: 2024-02-01
Payer: MEDICAID

## 2024-02-01 ENCOUNTER — HOSPITAL ENCOUNTER (INPATIENT)
Facility: HOSPITAL | Age: 59
LOS: 8 days | Discharge: NURSING, REHAB OR LONG-TERM CARE | DRG: 720 | End: 2024-02-09
Attending: EMERGENCY MEDICINE | Admitting: INTERNAL MEDICINE
Payer: MEDICAID

## 2024-02-01 DIAGNOSIS — G90.01 CAROTID SINUS SYNCOPE: ICD-10-CM

## 2024-02-01 DIAGNOSIS — L03.119 CELLULITIS OF LOWER EXTREMITY, UNSPECIFIED LATERALITY: ICD-10-CM

## 2024-02-01 DIAGNOSIS — A41.9 SEVERE SEPSIS (HCC): Primary | ICD-10-CM

## 2024-02-01 DIAGNOSIS — R65.20 SEVERE SEPSIS (HCC): Primary | ICD-10-CM

## 2024-02-01 LAB
ALBUMIN SERPL-MCNC: 2 G/DL (ref 3.5–5)
ALBUMIN/GLOB SERPL: 0.7 (ref 1.1–2.2)
ALP SERPL-CCNC: 173 U/L (ref 45–117)
ALT SERPL-CCNC: 12 U/L (ref 12–78)
ANION GAP SERPL CALC-SCNC: 6 MMOL/L (ref 5–15)
APPEARANCE UR: CLEAR
AST SERPL W P-5'-P-CCNC: 29 U/L (ref 15–37)
BACTERIA URNS QL MICRO: NEGATIVE /HPF
BASOPHILS # BLD: 0 K/UL (ref 0–0.1)
BASOPHILS NFR BLD: 0 % (ref 0–1)
BILIRUB SERPL-MCNC: 0.8 MG/DL (ref 0.2–1)
BILIRUB UR QL: NEGATIVE
BUN SERPL-MCNC: 3 MG/DL (ref 6–20)
BUN/CREAT SERPL: 6 (ref 12–20)
CA-I BLD-MCNC: 7.8 MG/DL (ref 8.5–10.1)
CHLORIDE SERPL-SCNC: 106 MMOL/L (ref 97–108)
CO2 SERPL-SCNC: 24 MMOL/L (ref 21–32)
COLOR UR: ABNORMAL
CREAT SERPL-MCNC: 0.48 MG/DL (ref 0.7–1.3)
DIFFERENTIAL METHOD BLD: ABNORMAL
EOSINOPHIL # BLD: 0 K/UL (ref 0–0.4)
EOSINOPHIL NFR BLD: 0 % (ref 0–7)
EPITH CASTS URNS QL MICRO: ABNORMAL /LPF
ERYTHROCYTE [DISTWIDTH] IN BLOOD BY AUTOMATED COUNT: 16.2 % (ref 11.5–14.5)
GLOBULIN SER CALC-MCNC: 2.8 G/DL (ref 2–4)
GLUCOSE BLD STRIP.AUTO-MCNC: 138 MG/DL (ref 65–100)
GLUCOSE SERPL-MCNC: 131 MG/DL (ref 65–100)
GLUCOSE UR STRIP.AUTO-MCNC: NEGATIVE MG/DL
HCT VFR BLD AUTO: 25.8 % (ref 36.6–50.3)
HGB BLD-MCNC: 8.9 G/DL (ref 12.1–17)
HGB UR QL STRIP: NEGATIVE
HYALINE CASTS URNS QL MICRO: ABNORMAL /LPF (ref 0–5)
IMM GRANULOCYTES # BLD AUTO: 0 K/UL (ref 0–0.04)
IMM GRANULOCYTES NFR BLD AUTO: 1 % (ref 0–0.5)
KETONES UR QL STRIP.AUTO: NEGATIVE MG/DL
LACTATE BLD-SCNC: 1.66 MMOL/L (ref 0.4–2)
LEUKOCYTE ESTERASE UR QL STRIP.AUTO: NEGATIVE
LYMPHOCYTES # BLD: 1.7 K/UL (ref 0.8–3.5)
LYMPHOCYTES NFR BLD: 20 % (ref 12–49)
MCH RBC QN AUTO: 33.8 PG (ref 26–34)
MCHC RBC AUTO-ENTMCNC: 34.5 G/DL (ref 30–36.5)
MCV RBC AUTO: 98.1 FL (ref 80–99)
MONOCYTES # BLD: 0.7 K/UL (ref 0–1)
MONOCYTES NFR BLD: 8 % (ref 5–13)
MUCOUS THREADS URNS QL MICRO: ABNORMAL /LPF
NEUTS SEG # BLD: 5.8 K/UL (ref 1.8–8)
NEUTS SEG NFR BLD: 71 % (ref 32–75)
NITRITE UR QL STRIP.AUTO: NEGATIVE
NRBC # BLD: 0 K/UL (ref 0–0.01)
NRBC BLD-RTO: 0 PER 100 WBC
PERFORMED BY:: ABNORMAL
PERFORMED BY:: NORMAL
PH UR STRIP: 6 (ref 5–8)
PLATELET # BLD AUTO: 231 K/UL (ref 150–400)
PMV BLD AUTO: 9.1 FL (ref 8.9–12.9)
POTASSIUM SERPL-SCNC: 3.1 MMOL/L (ref 3.5–5.1)
PROCALCITONIN SERPL-MCNC: 0.15 NG/ML
PROT SERPL-MCNC: 4.8 G/DL (ref 6.4–8.2)
PROT UR STRIP-MCNC: NEGATIVE MG/DL
RBC # BLD AUTO: 2.63 M/UL (ref 4.1–5.7)
RBC #/AREA URNS HPF: ABNORMAL /HPF (ref 0–5)
SODIUM SERPL-SCNC: 136 MMOL/L (ref 136–145)
SP GR UR REFRACTOMETRY: 1.01 (ref 1–1.03)
TROPONIN I SERPL HS-MCNC: 8 NG/L (ref 0–76)
URINE CULTURE IF INDICATED: ABNORMAL
UROBILINOGEN UR QL STRIP.AUTO: 0.1 EU/DL (ref 0.1–1)
WBC # BLD AUTO: 8.2 K/UL (ref 4.1–11.1)
WBC URNS QL MICRO: ABNORMAL /HPF (ref 0–4)

## 2024-02-01 PROCEDURE — 85025 COMPLETE CBC W/AUTO DIFF WBC: CPT

## 2024-02-01 PROCEDURE — 82962 GLUCOSE BLOOD TEST: CPT

## 2024-02-01 PROCEDURE — 6360000002 HC RX W HCPCS: Performed by: EMERGENCY MEDICINE

## 2024-02-01 PROCEDURE — 36415 COLL VENOUS BLD VENIPUNCTURE: CPT

## 2024-02-01 PROCEDURE — 96367 TX/PROPH/DG ADDL SEQ IV INF: CPT

## 2024-02-01 PROCEDURE — 1100000000 HC RM PRIVATE

## 2024-02-01 PROCEDURE — 87040 BLOOD CULTURE FOR BACTERIA: CPT

## 2024-02-01 PROCEDURE — 83605 ASSAY OF LACTIC ACID: CPT

## 2024-02-01 PROCEDURE — 6370000000 HC RX 637 (ALT 250 FOR IP): Performed by: EMERGENCY MEDICINE

## 2024-02-01 PROCEDURE — 2580000003 HC RX 258: Performed by: EMERGENCY MEDICINE

## 2024-02-01 PROCEDURE — 81001 URINALYSIS AUTO W/SCOPE: CPT

## 2024-02-01 PROCEDURE — 80053 COMPREHEN METABOLIC PANEL: CPT

## 2024-02-01 PROCEDURE — 71045 X-RAY EXAM CHEST 1 VIEW: CPT

## 2024-02-01 PROCEDURE — 84484 ASSAY OF TROPONIN QUANT: CPT

## 2024-02-01 PROCEDURE — 96375 TX/PRO/DX INJ NEW DRUG ADDON: CPT

## 2024-02-01 PROCEDURE — 84145 PROCALCITONIN (PCT): CPT

## 2024-02-01 PROCEDURE — 96365 THER/PROPH/DIAG IV INF INIT: CPT

## 2024-02-01 PROCEDURE — 99285 EMERGENCY DEPT VISIT HI MDM: CPT

## 2024-02-01 PROCEDURE — 93005 ELECTROCARDIOGRAM TRACING: CPT | Performed by: EMERGENCY MEDICINE

## 2024-02-01 RX ORDER — GAUZE BANDAGE 2" X 2"
100 BANDAGE TOPICAL DAILY
Status: DISCONTINUED | OUTPATIENT
Start: 2024-02-02 | End: 2024-02-09 | Stop reason: HOSPADM

## 2024-02-01 RX ORDER — POTASSIUM CHLORIDE 7.45 MG/ML
10 INJECTION INTRAVENOUS ONCE
Status: COMPLETED | OUTPATIENT
Start: 2024-02-01 | End: 2024-02-02

## 2024-02-01 RX ORDER — ONDANSETRON 2 MG/ML
4 INJECTION INTRAMUSCULAR; INTRAVENOUS EVERY 6 HOURS PRN
Status: DISCONTINUED | OUTPATIENT
Start: 2024-02-01 | End: 2024-02-09 | Stop reason: HOSPADM

## 2024-02-01 RX ORDER — GABAPENTIN 300 MG/1
600 CAPSULE ORAL 3 TIMES DAILY
Status: DISCONTINUED | OUTPATIENT
Start: 2024-02-01 | End: 2024-02-09 | Stop reason: HOSPADM

## 2024-02-01 RX ORDER — FENTANYL CITRATE 50 UG/ML
50 INJECTION, SOLUTION INTRAMUSCULAR; INTRAVENOUS
Status: COMPLETED | OUTPATIENT
Start: 2024-02-01 | End: 2024-02-01

## 2024-02-01 RX ORDER — 0.9 % SODIUM CHLORIDE 0.9 %
1000 INTRAVENOUS SOLUTION INTRAVENOUS ONCE
Status: COMPLETED | OUTPATIENT
Start: 2024-02-01 | End: 2024-02-01

## 2024-02-01 RX ORDER — SODIUM CHLORIDE, SODIUM LACTATE, POTASSIUM CHLORIDE, CALCIUM CHLORIDE 600; 310; 30; 20 MG/100ML; MG/100ML; MG/100ML; MG/100ML
INJECTION, SOLUTION INTRAVENOUS CONTINUOUS
Status: DISPENSED | OUTPATIENT
Start: 2024-02-01 | End: 2024-02-02

## 2024-02-01 RX ORDER — ENOXAPARIN SODIUM 100 MG/ML
40 INJECTION SUBCUTANEOUS DAILY
Status: DISCONTINUED | OUTPATIENT
Start: 2024-02-02 | End: 2024-02-09 | Stop reason: HOSPADM

## 2024-02-01 RX ORDER — SODIUM CHLORIDE 0.9 % (FLUSH) 0.9 %
5-40 SYRINGE (ML) INJECTION EVERY 12 HOURS SCHEDULED
Status: DISCONTINUED | OUTPATIENT
Start: 2024-02-01 | End: 2024-02-09 | Stop reason: HOSPADM

## 2024-02-01 RX ORDER — KETOROLAC TROMETHAMINE 15 MG/ML
15 INJECTION, SOLUTION INTRAMUSCULAR; INTRAVENOUS EVERY 6 HOURS PRN
Status: DISPENSED | OUTPATIENT
Start: 2024-02-01 | End: 2024-02-06

## 2024-02-01 RX ORDER — ACETAMINOPHEN 650 MG/1
650 SUPPOSITORY RECTAL EVERY 6 HOURS PRN
Status: DISCONTINUED | OUTPATIENT
Start: 2024-02-01 | End: 2024-02-09 | Stop reason: HOSPADM

## 2024-02-01 RX ORDER — 0.9 % SODIUM CHLORIDE 0.9 %
1000 INTRAVENOUS SOLUTION INTRAVENOUS
Status: COMPLETED | OUTPATIENT
Start: 2024-02-01 | End: 2024-02-01

## 2024-02-01 RX ORDER — ONDANSETRON 4 MG/1
4 TABLET, ORALLY DISINTEGRATING ORAL EVERY 8 HOURS PRN
Status: DISCONTINUED | OUTPATIENT
Start: 2024-02-01 | End: 2024-02-09 | Stop reason: HOSPADM

## 2024-02-01 RX ORDER — POLYETHYLENE GLYCOL 3350 17 G/17G
17 POWDER, FOR SOLUTION ORAL DAILY PRN
Status: DISCONTINUED | OUTPATIENT
Start: 2024-02-01 | End: 2024-02-09 | Stop reason: HOSPADM

## 2024-02-01 RX ORDER — ACETAMINOPHEN 325 MG/1
650 TABLET ORAL EVERY 6 HOURS PRN
Status: DISCONTINUED | OUTPATIENT
Start: 2024-02-01 | End: 2024-02-09 | Stop reason: HOSPADM

## 2024-02-01 RX ORDER — POTASSIUM CHLORIDE 20 MEQ/1
20 TABLET, EXTENDED RELEASE ORAL DAILY
Status: DISCONTINUED | OUTPATIENT
Start: 2024-02-02 | End: 2024-02-09 | Stop reason: HOSPADM

## 2024-02-01 RX ORDER — LANOLIN ALCOHOL/MO/W.PET/CERES
400 CREAM (GRAM) TOPICAL DAILY
Status: DISCONTINUED | OUTPATIENT
Start: 2024-02-02 | End: 2024-02-09 | Stop reason: HOSPADM

## 2024-02-01 RX ORDER — SODIUM CHLORIDE 9 MG/ML
INJECTION, SOLUTION INTRAVENOUS PRN
Status: DISCONTINUED | OUTPATIENT
Start: 2024-02-01 | End: 2024-02-09 | Stop reason: HOSPADM

## 2024-02-01 RX ORDER — MAGNESIUM SULFATE IN WATER 40 MG/ML
2000 INJECTION, SOLUTION INTRAVENOUS PRN
Status: DISCONTINUED | OUTPATIENT
Start: 2024-02-01 | End: 2024-02-09 | Stop reason: HOSPADM

## 2024-02-01 RX ORDER — POTASSIUM CHLORIDE 20 MEQ/1
40 TABLET, EXTENDED RELEASE ORAL PRN
Status: DISCONTINUED | OUTPATIENT
Start: 2024-02-01 | End: 2024-02-09 | Stop reason: HOSPADM

## 2024-02-01 RX ORDER — MIDODRINE HYDROCHLORIDE 2.5 MG/1
2.5 TABLET ORAL 2 TIMES DAILY WITH MEALS
Status: DISCONTINUED | OUTPATIENT
Start: 2024-02-02 | End: 2024-02-06

## 2024-02-01 RX ORDER — POTASSIUM CHLORIDE 750 MG/1
40 TABLET, FILM COATED, EXTENDED RELEASE ORAL ONCE
Status: COMPLETED | OUTPATIENT
Start: 2024-02-01 | End: 2024-02-01

## 2024-02-01 RX ORDER — SODIUM CHLORIDE 0.9 % (FLUSH) 0.9 %
5-40 SYRINGE (ML) INJECTION PRN
Status: DISCONTINUED | OUTPATIENT
Start: 2024-02-01 | End: 2024-02-09 | Stop reason: HOSPADM

## 2024-02-01 RX ORDER — HYDROCODONE BITARTRATE AND ACETAMINOPHEN 10; 325 MG/1; MG/1
1 TABLET ORAL EVERY 4 HOURS PRN
Status: DISCONTINUED | OUTPATIENT
Start: 2024-02-01 | End: 2024-02-09 | Stop reason: HOSPADM

## 2024-02-01 RX ORDER — POTASSIUM CHLORIDE 7.45 MG/ML
10 INJECTION INTRAVENOUS PRN
Status: DISCONTINUED | OUTPATIENT
Start: 2024-02-01 | End: 2024-02-09 | Stop reason: HOSPADM

## 2024-02-01 RX ORDER — PANTOPRAZOLE SODIUM 40 MG/1
40 TABLET, DELAYED RELEASE ORAL DAILY
Status: DISCONTINUED | OUTPATIENT
Start: 2024-02-02 | End: 2024-02-09 | Stop reason: HOSPADM

## 2024-02-01 RX ADMIN — POTASSIUM CHLORIDE 10 MEQ: 7.46 INJECTION, SOLUTION INTRAVENOUS at 22:52

## 2024-02-01 RX ADMIN — POTASSIUM CHLORIDE 40 MEQ: 750 TABLET, EXTENDED RELEASE ORAL at 22:50

## 2024-02-01 RX ADMIN — FENTANYL CITRATE 50 MCG: 50 INJECTION INTRAMUSCULAR; INTRAVENOUS at 21:16

## 2024-02-01 RX ADMIN — CEFEPIME 2000 MG: 2 INJECTION, POWDER, FOR SOLUTION INTRAVENOUS at 21:09

## 2024-02-01 RX ADMIN — SODIUM CHLORIDE 1000 ML: 9 INJECTION, SOLUTION INTRAVENOUS at 22:50

## 2024-02-01 RX ADMIN — VANCOMYCIN HYDROCHLORIDE 2000 MG: 1 INJECTION, POWDER, LYOPHILIZED, FOR SOLUTION INTRAVENOUS at 21:50

## 2024-02-01 RX ADMIN — SODIUM CHLORIDE 1000 ML: 9 INJECTION, SOLUTION INTRAVENOUS at 21:09

## 2024-02-01 ASSESSMENT — PAIN - FUNCTIONAL ASSESSMENT: PAIN_FUNCTIONAL_ASSESSMENT: 0-10

## 2024-02-01 ASSESSMENT — PAIN SCALES - GENERAL
PAINLEVEL_OUTOF10: 8
PAINLEVEL_OUTOF10: 4
PAINLEVEL_OUTOF10: 8
PAINLEVEL_OUTOF10: 7

## 2024-02-02 ENCOUNTER — APPOINTMENT (OUTPATIENT)
Facility: HOSPITAL | Age: 59
DRG: 720 | End: 2024-02-02
Payer: MEDICAID

## 2024-02-02 LAB
ANION GAP SERPL CALC-SCNC: 7 MMOL/L (ref 5–15)
BACTERIA SPEC CULT: NORMAL
BACTERIA SPEC CULT: NORMAL
BASOPHILS # BLD: 0 K/UL (ref 0–0.1)
BASOPHILS NFR BLD: 0 % (ref 0–1)
BUN SERPL-MCNC: 3 MG/DL (ref 6–20)
BUN/CREAT SERPL: 6 (ref 12–20)
CA-I BLD-MCNC: 7.3 MG/DL (ref 8.5–10.1)
CHLORIDE SERPL-SCNC: 113 MMOL/L (ref 97–108)
CO2 SERPL-SCNC: 21 MMOL/L (ref 21–32)
CREAT SERPL-MCNC: 0.48 MG/DL (ref 0.7–1.3)
DIFFERENTIAL METHOD BLD: ABNORMAL
EKG ATRIAL RATE: 120 BPM
EKG DIAGNOSIS: NORMAL
EKG P AXIS: 47 DEGREES
EKG P-R INTERVAL: 154 MS
EKG Q-T INTERVAL: 324 MS
EKG QRS DURATION: 82 MS
EKG QTC CALCULATION (BAZETT): 457 MS
EKG R AXIS: -11 DEGREES
EKG T AXIS: 18 DEGREES
EKG VENTRICULAR RATE: 120 BPM
EOSINOPHIL # BLD: 0 K/UL (ref 0–0.4)
EOSINOPHIL NFR BLD: 0 % (ref 0–7)
ERYTHROCYTE [DISTWIDTH] IN BLOOD BY AUTOMATED COUNT: 16.7 % (ref 11.5–14.5)
GLUCOSE SERPL-MCNC: 110 MG/DL (ref 65–100)
HCT VFR BLD AUTO: 24.1 % (ref 36.6–50.3)
HGB BLD-MCNC: 8.3 G/DL (ref 12.1–17)
IMM GRANULOCYTES # BLD AUTO: 0 K/UL (ref 0–0.04)
IMM GRANULOCYTES NFR BLD AUTO: 0 % (ref 0–0.5)
LACTATE BLD-SCNC: 2.45 MMOL/L (ref 0.4–2)
LACTATE BLD-SCNC: 2.58 MMOL/L (ref 0.4–2)
LACTATE SERPL-SCNC: 2.4 MMOL/L (ref 0.4–2)
LYMPHOCYTES # BLD: 1.7 K/UL (ref 0.8–3.5)
LYMPHOCYTES NFR BLD: 26 % (ref 12–49)
Lab: NORMAL
Lab: NORMAL
MCH RBC QN AUTO: 34.4 PG (ref 26–34)
MCHC RBC AUTO-ENTMCNC: 34.4 G/DL (ref 30–36.5)
MCV RBC AUTO: 100 FL (ref 80–99)
MONOCYTES # BLD: 0.6 K/UL (ref 0–1)
MONOCYTES NFR BLD: 9 % (ref 5–13)
NEUTS SEG # BLD: 4.2 K/UL (ref 1.8–8)
NEUTS SEG NFR BLD: 65 % (ref 32–75)
NRBC # BLD: 0 K/UL (ref 0–0.01)
NRBC BLD-RTO: 0 PER 100 WBC
PERFORMED BY:: ABNORMAL
PERFORMED BY:: ABNORMAL
PLATELET # BLD AUTO: 226 K/UL (ref 150–400)
PMV BLD AUTO: 9.4 FL (ref 8.9–12.9)
POTASSIUM SERPL-SCNC: 3.5 MMOL/L (ref 3.5–5.1)
RBC # BLD AUTO: 2.41 M/UL (ref 4.1–5.7)
SODIUM SERPL-SCNC: 141 MMOL/L (ref 136–145)
WBC # BLD AUTO: 6.5 K/UL (ref 4.1–11.1)

## 2024-02-02 PROCEDURE — 2060000000 HC ICU INTERMEDIATE R&B

## 2024-02-02 PROCEDURE — 2580000003 HC RX 258: Performed by: INTERNAL MEDICINE

## 2024-02-02 PROCEDURE — 6360000002 HC RX W HCPCS: Performed by: INTERNAL MEDICINE

## 2024-02-02 PROCEDURE — 85025 COMPLETE CBC W/AUTO DIFF WBC: CPT

## 2024-02-02 PROCEDURE — 36415 COLL VENOUS BLD VENIPUNCTURE: CPT

## 2024-02-02 PROCEDURE — 2580000003 HC RX 258: Performed by: PHYSICIAN ASSISTANT

## 2024-02-02 PROCEDURE — 83605 ASSAY OF LACTIC ACID: CPT

## 2024-02-02 PROCEDURE — 6370000000 HC RX 637 (ALT 250 FOR IP): Performed by: INTERNAL MEDICINE

## 2024-02-02 PROCEDURE — 6360000002 HC RX W HCPCS: Performed by: PHYSICIAN ASSISTANT

## 2024-02-02 PROCEDURE — 73700 CT LOWER EXTREMITY W/O DYE: CPT

## 2024-02-02 PROCEDURE — A4216 STERILE WATER/SALINE, 10 ML: HCPCS | Performed by: INTERNAL MEDICINE

## 2024-02-02 PROCEDURE — 2500000003 HC RX 250 WO HCPCS: Performed by: INTERNAL MEDICINE

## 2024-02-02 PROCEDURE — 80048 BASIC METABOLIC PNL TOTAL CA: CPT

## 2024-02-02 PROCEDURE — 99223 1ST HOSP IP/OBS HIGH 75: CPT | Performed by: INTERNAL MEDICINE

## 2024-02-02 RX ORDER — FUROSEMIDE 10 MG/ML
40 INJECTION INTRAMUSCULAR; INTRAVENOUS ONCE
Status: COMPLETED | OUTPATIENT
Start: 2024-02-02 | End: 2024-02-02

## 2024-02-02 RX ORDER — SODIUM CHLORIDE, SODIUM LACTATE, POTASSIUM CHLORIDE, AND CALCIUM CHLORIDE .6; .31; .03; .02 G/100ML; G/100ML; G/100ML; G/100ML
500 INJECTION, SOLUTION INTRAVENOUS ONCE
Status: COMPLETED | OUTPATIENT
Start: 2024-02-02 | End: 2024-02-02

## 2024-02-02 RX ORDER — SODIUM CHLORIDE 450 MG/100ML
INJECTION, SOLUTION INTRAVENOUS CONTINUOUS
Status: DISCONTINUED | OUTPATIENT
Start: 2024-02-02 | End: 2024-02-09 | Stop reason: HOSPADM

## 2024-02-02 RX ORDER — FOLIC ACID 1 MG/1
1 TABLET ORAL DAILY
Status: DISCONTINUED | OUTPATIENT
Start: 2024-02-02 | End: 2024-02-09 | Stop reason: HOSPADM

## 2024-02-02 RX ADMIN — FUROSEMIDE 40 MG: 10 INJECTION, SOLUTION INTRAMUSCULAR; INTRAVENOUS at 18:23

## 2024-02-02 RX ADMIN — CEFAZOLIN 2000 MG: 1 INJECTION, POWDER, FOR SOLUTION INTRAMUSCULAR; INTRAVENOUS at 14:03

## 2024-02-02 RX ADMIN — PANTOPRAZOLE SODIUM 40 MG: 40 TABLET, DELAYED RELEASE ORAL at 08:59

## 2024-02-02 RX ADMIN — ENOXAPARIN SODIUM 40 MG: 100 INJECTION SUBCUTANEOUS at 09:00

## 2024-02-02 RX ADMIN — CEFAZOLIN 2000 MG: 1 INJECTION, POWDER, FOR SOLUTION INTRAMUSCULAR; INTRAVENOUS at 05:04

## 2024-02-02 RX ADMIN — GABAPENTIN 600 MG: 300 CAPSULE ORAL at 08:58

## 2024-02-02 RX ADMIN — KETOROLAC TROMETHAMINE 15 MG: 15 INJECTION, SOLUTION INTRAMUSCULAR; INTRAVENOUS at 18:55

## 2024-02-02 RX ADMIN — KETOROLAC TROMETHAMINE 15 MG: 15 INJECTION, SOLUTION INTRAMUSCULAR; INTRAVENOUS at 09:00

## 2024-02-02 RX ADMIN — SODIUM CHLORIDE, POTASSIUM CHLORIDE, SODIUM LACTATE AND CALCIUM CHLORIDE: 600; 310; 30; 20 INJECTION, SOLUTION INTRAVENOUS at 00:28

## 2024-02-02 RX ADMIN — Medication 400 MG: at 09:00

## 2024-02-02 RX ADMIN — GABAPENTIN 600 MG: 300 CAPSULE ORAL at 14:04

## 2024-02-02 RX ADMIN — SODIUM CHLORIDE, PRESERVATIVE FREE 10 ML: 5 INJECTION INTRAVENOUS at 00:28

## 2024-02-02 RX ADMIN — SODIUM CHLORIDE, PRESERVATIVE FREE 10 ML: 5 INJECTION INTRAVENOUS at 08:45

## 2024-02-02 RX ADMIN — SODIUM CHLORIDE, POTASSIUM CHLORIDE, SODIUM LACTATE AND CALCIUM CHLORIDE 500 ML: 600; 310; 30; 20 INJECTION, SOLUTION INTRAVENOUS at 07:55

## 2024-02-02 RX ADMIN — THIAMINE HCL TAB 100 MG 100 MG: 100 TAB at 09:00

## 2024-02-02 RX ADMIN — PIPERACILLIN AND TAZOBACTAM 4500 MG: 4; .5 INJECTION, POWDER, LYOPHILIZED, FOR SOLUTION INTRAVENOUS at 18:21

## 2024-02-02 RX ADMIN — MIDODRINE HYDROCHLORIDE 2.5 MG: 5 TABLET ORAL at 08:58

## 2024-02-02 RX ADMIN — GABAPENTIN 600 MG: 300 CAPSULE ORAL at 22:17

## 2024-02-02 RX ADMIN — HYDROCODONE BITARTRATE AND ACETAMINOPHEN 1 TABLET: 10; 325 TABLET ORAL at 18:55

## 2024-02-02 RX ADMIN — SODIUM CHLORIDE, PRESERVATIVE FREE 10 ML: 5 INJECTION INTRAVENOUS at 22:36

## 2024-02-02 RX ADMIN — FOLIC ACID 1 MG: 1 TABLET ORAL at 08:58

## 2024-02-02 RX ADMIN — POTASSIUM CHLORIDE 20 MEQ: 1500 TABLET, EXTENDED RELEASE ORAL at 08:59

## 2024-02-02 RX ADMIN — SODIUM CHLORIDE: 4.5 INJECTION, SOLUTION INTRAVENOUS at 18:23

## 2024-02-02 RX ADMIN — HYDROCODONE BITARTRATE AND ACETAMINOPHEN 1 TABLET: 10; 325 TABLET ORAL at 09:00

## 2024-02-02 RX ADMIN — FAMOTIDINE 20 MG: 10 INJECTION, SOLUTION INTRAVENOUS at 00:27

## 2024-02-02 RX ADMIN — PIPERACILLIN AND TAZOBACTAM 3375 MG: 3; .375 INJECTION, POWDER, FOR SOLUTION INTRAVENOUS at 22:20

## 2024-02-02 ASSESSMENT — PAIN SCALES - GENERAL
PAINLEVEL_OUTOF10: 9
PAINLEVEL_OUTOF10: 7

## 2024-02-02 ASSESSMENT — PAIN DESCRIPTION - LOCATION: LOCATION: ANKLE;FOOT

## 2024-02-02 ASSESSMENT — PAIN DESCRIPTION - ORIENTATION: ORIENTATION: LEFT

## 2024-02-02 NOTE — ED NOTES
Pt provided with food,drink, extra pillow, and blankets. No complaints at this time. Call bell within reach.

## 2024-02-02 NOTE — ED PROVIDER NOTES
up the legs   Skin:     General: Skin is warm.   Neurological:      General: No focal deficit present.      Mental Status: He is alert. Mental status is at baseline.   Psychiatric:         Mood and Affect: Mood normal.           SCREENINGS     NIH Stroke Scale  Interval: Baseline  Level of Consciousness (1a): Alert  LOC Questions (1b): Answers both correctly  LOC Commands (1c): Performs both tasks correctly  Best Gaze (2): Normal  Visual (3): No visual loss  Facial Palsy (4): Normal symmetrical movement  Motor Arm, Left (5a): No drift  Motor Arm, Right (5b): No drift  Motor Leg, Left (6a): No drift  Motor Leg, Right (6b): No drift  Limb Ataxia (7): Absent  Sensory (8): Normal  Best Language (9): No aphasia  Dysarthria (10): Normal  Extinction and Inattention (11): No abnormality  Total: 0             LAB, EKG AND DIAGNOSTIC RESULTS   Labs:  Recent Results (from the past 12 hour(s))   POCT Glucose    Collection Time: 02/01/24  8:35 PM   Result Value Ref Range    POC Glucose 138 (H) 65 - 100 mg/dL    Performed by: Martin Lee    Urinalysis with Reflex to Culture    Collection Time: 02/01/24  8:38 PM    Specimen: Urine   Result Value Ref Range    Color, UA Yellow/Straw      Appearance Clear Clear      Specific Gravity, UA 1.009 1.003 - 1.030      pH, Urine 6.0 5.0 - 8.0      Protein, UA Negative Negative mg/dL    Glucose, UA Negative Negative mg/dL    Ketones, Urine Negative Negative mg/dL    Bilirubin Urine Negative Negative      Blood, Urine Negative Negative      Urobilinogen, Urine 0.1 0.1 - 1.0 EU/dL    Nitrite, Urine Negative Negative      Leukocyte Esterase, Urine Negative Negative      WBC, UA 0-4 0 - 4 /hpf    RBC, UA 0-5 0 - 5 /hpf    Epithelial Cells UA Few Few /lpf    BACTERIA, URINE Negative Negative /hpf    Urine Culture if Indicated Culture not indicated by UA result Culture not indicated by UA result      Mucus, UA Trace (A) Negative /lpf    Hyaline Casts, UA 0-2 0 - 5 /lpf   CBC with Auto  patient HAS severe sepsis/septic shock OR had a hypotensive blood pressure reading at any point. The patient therefore received the full standard 30ml/kg bolus based on ideal body weight due to obesity.  I have performed a sepsis reassessment of the patient's clinical volume status and tissue perfusion after the final volume of target fluid was initiated, at 1034p, and the patient is adequately resuscitated.    Disposition Considerations (Tests not done, Shared Decision Making, Pt Expectation of Test or Treatment.): See ED Course    Patient was given the following medications:  Medications   vancomycin (VANCOCIN) 2,000 mg in sodium chloride 0.9 % 500 mL IVPB (2,000 mg IntraVENous New Bag 2/1/24 2150)   sodium chloride 0.9 % bolus 1,000 mL (has no administration in time range)   potassium chloride 10 mEq/100 mL IVPB (Peripheral Line) (has no administration in time range)   potassium chloride (KLOR-CON) extended release tablet 40 mEq (has no administration in time range)   sodium chloride 0.9 % bolus 1,000 mL (1,000 mLs IntraVENous New Bag 2/1/24 2109)   ceFEPIme (MAXIPIME) 2,000 mg in sodium chloride 0.9 % 100 mL IVPB (mini-bag) (0 mg IntraVENous Stopped 2/1/24 2143)   fentaNYL (SUBLIMAZE) injection 50 mcg (50 mcg IntraVENous Given 2/1/24 2116)       CONSULTS: (Who and What was discussed)  None     Social Determinants affecting Dx or Tx: None    Smoking Cessation: Not Applicable    PROCEDURES   Unless otherwise noted above, none  Procedures      CRITICAL CARE TIME   CRITICAL CARE NOTE :    10:34 PM    IMPENDING DETERIORATION -Cardiovascular  ASSOCIATED RISK FACTORS - Hypotension and Shock  MANAGEMENT- Bedside Assessment and Supervision of Care  INTERPRETATION -  Xrays, ECG, Blood Pressure, and Cardiac Output Measures   INTERVENTIONS - hemodynamic mgmt  CASE REVIEW - Hospitalist/Intensivist and Nursing  TREATMENT RESPONSE -Improved  PERFORMED BY - Self    NOTES:  I have spent 60 minutes of critical care time involved

## 2024-02-02 NOTE — ED NOTES
Nursing supervisor ntfd that patient is uncomfortable and would like to be moved upstairs, supervisor ntfd and will be attempting to find be asap

## 2024-02-02 NOTE — ED NOTES
Dr. Skinner ntfd of Critical Lactic acid, he advised to notify Reasoner NEDA Bean Reasongregorio fernandez of critical lactic-2.58, no new orders given

## 2024-02-02 NOTE — PROGRESS NOTES
Hospitalist Progress Note    NAME:   Checo Dey   : 1965   MRN: [id number]     Date/Time: 2024 2:13 PM  Patient PCP: None, None    Estimated discharge date: 48 hours  Barriers: Sepsis    Checo Dey is a 58 year old male with a PMH of bilateral lower extremity swelling, alcohol use disorder, neuropathy,  and recurrent cellulitis who was admitted on  for cellulitis not responding to PO antibiotics as outpatient. Associated bilateral leg erythema and tenderness. Reported numbness at baseline. Patient also reports episodes of syncope where he wakes up and does not remember how he got to where he is. CXR no acute processes on . Lactic acid 2.45.    Assessment / Plan:    Non purulent cellulitis  Continue Zosyn   Continue vancomycin   CT LE to rule out gas gangrene or abscess    Severe Sepsis  UA no UTI  CXR no acute processes  Source cellulitis  Tachycardic, tachypneic and hypotensive overnight at 81/50 on    and /70  Afebrile, no leukocytosis  Lactic acid elevated 2.45  Sepsis IVF bolus given  Blood cultures in process  0.45% normal saline  Continue Zosyn and vancomycin  Continue midodrine    Bilateral lower extremity swelling  Lymphedema vs hypoalbuminemia  Continue nutrition supplement  Continue lasix    Syncope  Telemetry  Orthostatic hypotension most likely the etiology as patient was admitted with a blood pressure of 80/40  EKG sinus tachycardia, no heart block  CT head  ECHO  Venous Duplex DVT study    Neuropathy  Folate low on previous admissions  Check folate and B12  Continue PO folate  Continue gabapentin     Hypokalemia  K 3.5  Continue PO potassium once daily  Monitor    Macrocytic anemia  Hgb 8.9 to 8.3  Monitor    Visual hallucination  Psychiatry consult    Alcohol use disorder  No signs of withdrawal   CIWA protocol  Continue thiamine     Medical Decision Making:   I personally reviewed labs: WBC 6.5  I personally reviewed imaging:  XR CHEST PORTABLE   Final

## 2024-02-02 NOTE — PROGRESS NOTES
Hospitalist Progress Note    NAME:   Checo Dey   : 1965   MRN: [id number]     Date/Time: 2024 9:48 AM  Patient PCP: None, None    Estimated discharge date: 48 hours  Barriers: Sepsis    Checo Dey is a 58 year old male with a PMH of bilateral lower extremity swelling, alcohol use disorder, neuropathy,  and recurrent cellulitis who was admitted on  for cellulitis not responding to PO antibiotics as outpatient. Associated bilateral leg erythema and tenderness. Reported numbness at baseline. Patient also reports episodes of syncope where he wakes up and does not remember how he got to where he is. CXR no acute processes on . Lactic acid 2.45.    Assessment / Plan:    Non purulent cellulitis  Continue cefazolin   Continue vancomycin     Severe Sepsis  UA no UTI  CXR no acute processes  Source cellulitis  Tachycardic, tachypneic and hypotensive overnight at 81/50 on    and /70  Afebrile, no leukocytosis  Lactic acid elevated 2.45, continue to trend and check ABG  Blood cultures in process  0.45% normal saline  Continue cefazolin and vancomycin  Continue midodrine    Bilateral lower extremity swelling  Lymphedema vs hypoalbuminemia  Continue nutrition supplement  Hold lasix    Syncope  Telemetry  EKG sinus tachycardia, no heart block  CTA head and neck   CTA chest   CT head  MRI head   ECHO    Neuropathy  Folate low on previous admissions  Check folate and B12  Continue PO folate  Continue gabapentin     Hypokalemia  K 3.5  Continue PO potassium once daily  Monitor    Macrocytic anemia  Hgb 8.9 to 8.3  Monitor    Visual hallucination  Psychiatry consult    Alcohol use disorder  No signs of withdrawal   CIWA protocol  Continue thiamine     Medical Decision Making:   I personally reviewed labs: .LAB24[wbc  I personally reviewed imaging:  XR CHEST PORTABLE   Final Result   1. No acute disease             Toxic drug monitoring: bleeding on lovenox  Discussed case with:  2141 (!) 87/53 98.4 °F (36.9 °C) Oral (!) 109 11 95 % -- --   02/01/24 2135 (!) 81/50 -- -- (!) 105 17 96 % -- --   02/01/24 2130 (!) 79/48 -- -- (!) 113 11 96 % -- --   02/01/24 2120 90/60 -- -- (!) 111 12 93 % -- --   02/01/24 2040 105/61 -- -- (!) 120 15 98 % -- --   02/01/24 1915 (!) 141/89 98.8 °F (37.1 °C) -- (!) 119 18 99 % -- --   02/01/24 1904 -- -- -- -- 18 -- -- --   02/01/24 1903 -- -- -- -- -- -- 1.626 m (5' 4\") 83.9 kg (185 lb)         Intake/Output Summary (Last 24 hours) at 2/2/2024 0948  Last data filed at 2/2/2024 0136  Gross per 24 hour   Intake 200 ml   Output --   Net 200 ml        I had a face to face encounter and independently examined this patient on 2/2/2024, as outlined below:    Review of Systems   Constitutional:  Negative for chills, fatigue and fever.   HENT:  Negative for congestion, rhinorrhea and sore throat.    Respiratory:  Negative for cough and shortness of breath.    Cardiovascular:  Positive for leg swelling. Negative for chest pain and palpitations.   Gastrointestinal:  Negative for abdominal pain, constipation, diarrhea, nausea and vomiting.   Neurological:  Positive for syncope and numbness. Negative for headaches.        PHYSICAL EXAM:  Physical Exam  Constitutional:       General: He is not in acute distress.  HENT:      Head: Normocephalic and atraumatic.   Cardiovascular:      Rate and Rhythm: Normal rate and regular rhythm.      Heart sounds: Normal heart sounds.   Pulmonary:      Effort: Pulmonary effort is normal.      Breath sounds: Normal breath sounds.   Abdominal:      General: Abdomen is flat.      Palpations: Abdomen is soft.      Tenderness: There is no abdominal tenderness.   Musculoskeletal:      Right lower leg: Edema present.      Left lower leg: Edema present.   Skin:     General: Skin is warm and dry.      Comments: Bilateral lower extremity erythema with a few vesicles. No purulent drainage. Significant warmth and tenderness to palpation.

## 2024-02-02 NOTE — ED NOTES
Attempted to call Critical Results without success.    Critical Labs:  Lactic Acid 2.45    Pt has previously not started LR bolus which is being started

## 2024-02-02 NOTE — ED NOTES
ED TO INPATIENT SBAR HANDOFF    Patient Name: Checo Dey   Preferred Name: Checo  : 1965  58 y.o.   Family/Caregiver Present: no   Code Status Order: Full Code  PO Status: NPO:Yes  Telemetry Order:   C-SSRS:    Sitter no  Restraints:     Sepsis Risk Score Sepsis Risk Score: 1.23    Situation  Chief Complaint   Patient presents with    Cellulitis     Brief Description of Patient's Condition: cellulitis of lower extremity   Mental Status: oriented  Arrived from:Home  Imaging:   XR CHEST PORTABLE   Final Result   1. No acute disease          CT TIBIA FIBULA RIGHT WO CONTRAST    (Results Pending)   CT TIBIA FIBULA LEFT WO CONTRAST    (Results Pending)     Abnormal labs:   Abnormal Labs Reviewed   CBC WITH AUTO DIFFERENTIAL - Abnormal; Notable for the following components:       Result Value    RBC 2.63 (*)     Hemoglobin 8.9 (*)     Hematocrit 25.8 (*)     RDW 16.2 (*)     Immature Granulocytes 1 (*)     All other components within normal limits   COMPREHENSIVE METABOLIC PANEL - Abnormal; Notable for the following components:    Potassium 3.1 (*)     Glucose 131 (*)     BUN 3 (*)     Creatinine 0.48 (*)     Bun/Cre Ratio 6 (*)     Calcium 7.8 (*)     Alk Phosphatase 173 (*)     Total Protein 4.8 (*)     Albumin 2.0 (*)     Albumin/Globulin Ratio 0.7 (*)     All other components within normal limits   PROCALCITONIN - Abnormal; Notable for the following components:    Procalcitonin 0.15 (*)     All other components within normal limits   URINALYSIS WITH REFLEX TO CULTURE - Abnormal; Notable for the following components:    Mucus, UA Trace (*)     All other components within normal limits   BASIC METABOLIC PANEL W/ REFLEX TO MG FOR LOW K - Abnormal; Notable for the following components:    Chloride 113 (*)     Glucose 110 (*)     BUN 3 (*)     Creatinine 0.48 (*)     Bun/Cre Ratio 6 (*)     Calcium 7.3 (*)     All other components within normal limits   CBC WITH AUTO DIFFERENTIAL - Abnormal; Notable for the  drift  Motor Leg, Right (6b): No drift  Limb Ataxia (7): Absent  Sensory (8): Normal  Best Language (9): No aphasia  Dysarthria (10): Normal  Extinction and Inattention (11): No abnormality  Total: 0   Active LDA's:   Peripheral IV 02/01/24 Left;Dorsal Forearm (Active)   Site Assessment Clean, dry & intact 02/01/24 2101   Line Status Blood return noted 02/01/24 2101   Line Care Cap changed 02/01/24 2101   Phlebitis Assessment No symptoms 02/01/24 2101   Infiltration Assessment 0 02/01/24 2101   Alcohol Cap Used Yes 02/01/24 2101   Dressing Type Transparent 02/01/24 2101       Peripheral IV 02/01/24 Posterior;Right Hand (Active)     Active Central Lines:                          Active Wounds:    Active Chinchilla's:    Active Feeding Tubes:      Administered Medications:   Medications   lactated ringers IV soln infusion (0 mLs IntraVENous Stopped 2/2/24 1044)   gabapentin (NEURONTIN) capsule 600 mg (600 mg Oral Given 2/2/24 1404)   magnesium oxide (MAG-OX) tablet 400 mg (400 mg Oral Given 2/2/24 0900)   midodrine (PROAMATINE) tablet 2.5 mg (2.5 mg Oral Given 2/2/24 0858)   pantoprazole (PROTONIX) tablet 40 mg (40 mg Oral Given 2/2/24 0859)   potassium chloride (KLOR-CON M) extended release tablet 20 mEq (20 mEq Oral Given 2/2/24 0859)   thiamine mononitrate tablet 100 mg (100 mg Oral Given 2/2/24 0900)   ketorolac (TORADOL) injection 15 mg (15 mg IntraVENous Given 2/2/24 0900)   HYDROcodone-acetaminophen (NORCO)  MG per tablet 1 tablet (1 tablet Oral Given 2/2/24 0900)   sodium chloride flush 0.9 % injection 5-40 mL (10 mLs IntraVENous Given 2/2/24 0845)   sodium chloride flush 0.9 % injection 5-40 mL (has no administration in time range)   0.9 % sodium chloride infusion (has no administration in time range)   potassium chloride (KLOR-CON M) extended release tablet 40 mEq (has no administration in time range)     Or   potassium bicarb-citric acid (EFFER-K) effervescent tablet 40 mEq (has no administration in time

## 2024-02-02 NOTE — H&P
History & Physical    Primary Care Provider: None, None    Chief complaint:   Chief Complaint   Patient presents with    Cellulitis        History of Presenting Illness:   Checo Dey is a 58 y.o. male with PMH of hypertension and other medical problems as below. Patient presented to the ED with chief complaint of b/l LE pain and erythema, not responding to PO antibiotics as outpatient. Of note, he reports has underlying b/l LE edema (venous insufficiency vs lymphedema?). However, in the past 2 days, she has been having b/l lower leg redness and tenderness. Worsening swelling with blisters, denies trauma. He, however reports has been having recurrent falls in the past few weeks, attributing that to b/l LE weakness. He also reports having b/l LE numbness at baseline. He also reports episodes of syncope in the past few months. He reports feeling lightheaded before the event, however denies chest pain, palpitation or shortness of breath prior to episode. Uncertain duration of syncope. Denies triggered by positional changes or straining. Denies seizure like movement reported by witness. Denies positive-ictal confusion. He however reports noticed visual hallucination recently.     In the ED, noted tachycardic, later also developed hypotension. LA 1.66. kypok, potassium 3.1. no leukocytosis.       Chart review: none          Past Medical History:   Diagnosis Date    Bilateral lower extremity edema     Gastrointestinal disorder     Hypertension         No past surgical history on file.    No family history on file.     Social History     Socioeconomic History    Marital status: Single   Tobacco Use    Smoking status: Every Day     Current packs/day: 0.50     Types: Cigarettes    Smokeless tobacco: Never   Substance and Sexual Activity    Alcohol use: Yes     Comment: 1-2 drinks per day    Drug use: Never     Social Determinants of Health     Food Insecurity: No Food Insecurity (12/20/2023)    Hunger Vital Sign     treatments.  In addition, prior medical, surgical and relevant social and family histories were reviewed.    I have discussed patient's presentation/findings and clinical course to date with ED provider. Given the patient's current clinical presentation, I have a high level of concern for decompensation if discharged from the emergency department and that patient warrants admission to hospital.     Assessment and Plan:   # Cellulitis, non-purulent  - IV cefazolin empirically    # Sepsis  - Source:cellulitis  - Antibiotics empirically: above  - Fluid resuscitation  - Blood culture   - Trend LA    # B/l LE swelling  - Appears like lymphedema and/or hypoalbuminemia. Predispose to cellulitis.   - nutrition supplement for hypoalbuminemia.   - hold home PO lasix given hypokalemia and soft BP.     # Neuropathy  - previous lab  showed low folate, normal B12 level  - PO folate supplement. Continue home gabapentin     # Hypokalemia  - potassium supplement   - continue to monitor potassium     # Syncope  - telemetry monitor for arrhythmia.   - EKG showed sinus tachycardia w/o heart block or arrhythmia.   - orthostatic vitals, PT/OT   - if recurrent, consider cardiac work-up.   - also appreciate psychiatry input given also visual hallucination.     # Visual hallucination   - consult psychiatry     # Social Determents of health: None    # Full code as default. Need further clarification.     # Medication reconciliation though EMR and/or outside facility documentation. , However, medication reconciliation incomplete. Appreciate nursing/pharmacy's assistance             Signed By: Rocco Hamomnds Cha, MD     February 1, 2024     '

## 2024-02-02 NOTE — CONSULTS
Consult Note            Date:2/2/2024        Patient Name:Checo Dey     YOB: 1965     Age:58 y.o.    Consults Infectious Disease    Chief Complaint     Chief Complaint   Patient presents with    Cellulitis      Bilateral lower extremity cellulitis    History Obtained From   patient    History of Present Illness    This is a 58 year old male with bilateral lymphedema, followed in December 2023 for cellulitis lower extremities with history of blistering lesions.  He was treated with Tigecycline with clinical improvement and discharged on oral Doxycycline for 10 days. 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  RPR, ISMAEL and HIV were negative.  He presented this time again with pain and redness involving his lower extremities. He was afebrile with normal WBC and mildly elevated CRP. He has been started on Vancomycin and Zosyn. ID has been consulted for this reason.      Patient resting comfortably.  He states that his legs were improving until he ran out of Doxycycline.  He seemed to imply that [facility], but no records identified in Epic. He has apparently been taking Lasix at home.   Past Medical History     Past Medical History:   Diagnosis Date    Bilateral lower extremity edema     Gastrointestinal disorder     Hypertension         Past Surgical History   No past surgical history on file.     Medications     Prior to Admission medications    Medication Sig Start Date End Date Taking? Authorizing Provider   traMADol (ULTRAM) 50 MG tablet Take 1 tablet by mouth every 6 hours as needed for Pain for up to 7 days. Intended supply: 3 days. Take lowest dose possible to manage pain Max Daily Amount: 200 mg 1/30/24 2/6/24  Marcus Elias MD   dicloxacillin (DYNAPEN) 500 MG capsule Take 1 capsule by mouth 4 times daily for 10 days 1/27/24 2/6/24  Mehnaz Virk MD   furosemide (LASIX) 20 MG tablet Take 1 tablet by mouth daily  normal

## 2024-02-03 ENCOUNTER — APPOINTMENT (OUTPATIENT)
Facility: HOSPITAL | Age: 59
DRG: 720 | End: 2024-02-03
Payer: MEDICAID

## 2024-02-03 LAB
ALBUMIN SERPL-MCNC: 1.7 G/DL (ref 3.5–5)
ALBUMIN/GLOB SERPL: 0.6 (ref 1.1–2.2)
ALP SERPL-CCNC: 158 U/L (ref 45–117)
ALT SERPL-CCNC: 12 U/L (ref 12–78)
ANION GAP SERPL CALC-SCNC: 4 MMOL/L (ref 5–15)
AST SERPL W P-5'-P-CCNC: 31 U/L (ref 15–37)
BASOPHILS # BLD: 0 K/UL (ref 0–0.1)
BASOPHILS NFR BLD: 0 % (ref 0–1)
BILIRUB SERPL-MCNC: 0.4 MG/DL (ref 0.2–1)
BUN SERPL-MCNC: 4 MG/DL (ref 6–20)
BUN/CREAT SERPL: 6 (ref 12–20)
CA-I BLD-MCNC: 7.4 MG/DL (ref 8.5–10.1)
CHLORIDE SERPL-SCNC: 110 MMOL/L (ref 97–108)
CO2 SERPL-SCNC: 26 MMOL/L (ref 21–32)
CREAT SERPL-MCNC: 0.69 MG/DL (ref 0.7–1.3)
CRP SERPL-MCNC: 5.65 MG/DL (ref 0–0.3)
DIFFERENTIAL METHOD BLD: ABNORMAL
EOSINOPHIL # BLD: 0 K/UL (ref 0–0.4)
EOSINOPHIL NFR BLD: 0 % (ref 0–7)
ERYTHROCYTE [DISTWIDTH] IN BLOOD BY AUTOMATED COUNT: 17 % (ref 11.5–14.5)
GLOBULIN SER CALC-MCNC: 3 G/DL (ref 2–4)
GLUCOSE SERPL-MCNC: 94 MG/DL (ref 65–100)
HCT VFR BLD AUTO: 26.5 % (ref 36.6–50.3)
HGB BLD-MCNC: 9 G/DL (ref 12.1–17)
IMM GRANULOCYTES # BLD AUTO: 0 K/UL (ref 0–0.04)
IMM GRANULOCYTES NFR BLD AUTO: 0 % (ref 0–0.5)
LYMPHOCYTES # BLD: 1.8 K/UL (ref 0.8–3.5)
LYMPHOCYTES NFR BLD: 20 % (ref 12–49)
MCH RBC QN AUTO: 34.5 PG (ref 26–34)
MCHC RBC AUTO-ENTMCNC: 34 G/DL (ref 30–36.5)
MCV RBC AUTO: 101.5 FL (ref 80–99)
MONOCYTES # BLD: 0.7 K/UL (ref 0–1)
MONOCYTES NFR BLD: 8 % (ref 5–13)
NEUTS SEG # BLD: 6.2 K/UL (ref 1.8–8)
NEUTS SEG NFR BLD: 72 % (ref 32–75)
NRBC # BLD: 0 K/UL (ref 0–0.01)
NRBC BLD-RTO: 0 PER 100 WBC
PLATELET # BLD AUTO: 294 K/UL (ref 150–400)
PMV BLD AUTO: 9.3 FL (ref 8.9–12.9)
POTASSIUM SERPL-SCNC: 3.5 MMOL/L (ref 3.5–5.1)
PROCALCITONIN SERPL-MCNC: 0.24 NG/ML
PROT SERPL-MCNC: 4.7 G/DL (ref 6.4–8.2)
RBC # BLD AUTO: 2.61 M/UL (ref 4.1–5.7)
SODIUM SERPL-SCNC: 140 MMOL/L (ref 136–145)
WBC # BLD AUTO: 8.7 K/UL (ref 4.1–11.1)

## 2024-02-03 PROCEDURE — 6370000000 HC RX 637 (ALT 250 FOR IP): Performed by: INTERNAL MEDICINE

## 2024-02-03 PROCEDURE — 84145 PROCALCITONIN (PCT): CPT

## 2024-02-03 PROCEDURE — 86215 DEOXYRIBONUCLEASE ANTIBODY: CPT

## 2024-02-03 PROCEDURE — 71275 CT ANGIOGRAPHY CHEST: CPT

## 2024-02-03 PROCEDURE — 6370000000 HC RX 637 (ALT 250 FOR IP): Performed by: PSYCHIATRY & NEUROLOGY

## 2024-02-03 PROCEDURE — 80053 COMPREHEN METABOLIC PANEL: CPT

## 2024-02-03 PROCEDURE — 2580000003 HC RX 258: Performed by: PHYSICIAN ASSISTANT

## 2024-02-03 PROCEDURE — 99232 SBSQ HOSP IP/OBS MODERATE 35: CPT | Performed by: INTERNAL MEDICINE

## 2024-02-03 PROCEDURE — 2580000003 HC RX 258: Performed by: INTERNAL MEDICINE

## 2024-02-03 PROCEDURE — 86140 C-REACTIVE PROTEIN: CPT

## 2024-02-03 PROCEDURE — 36415 COLL VENOUS BLD VENIPUNCTURE: CPT

## 2024-02-03 PROCEDURE — 6360000002 HC RX W HCPCS: Performed by: PHYSICIAN ASSISTANT

## 2024-02-03 PROCEDURE — 6360000004 HC RX CONTRAST MEDICATION: Performed by: PHYSICIAN ASSISTANT

## 2024-02-03 PROCEDURE — 85025 COMPLETE CBC W/AUTO DIFF WBC: CPT

## 2024-02-03 PROCEDURE — 2060000000 HC ICU INTERMEDIATE R&B

## 2024-02-03 PROCEDURE — 70450 CT HEAD/BRAIN W/O DYE: CPT

## 2024-02-03 PROCEDURE — 6360000002 HC RX W HCPCS: Performed by: INTERNAL MEDICINE

## 2024-02-03 PROCEDURE — 86060 ANTISTREPTOLYSIN O TITER: CPT

## 2024-02-03 RX ORDER — LORAZEPAM 2 MG/ML
3 INJECTION INTRAMUSCULAR
Status: DISCONTINUED | OUTPATIENT
Start: 2024-02-03 | End: 2024-02-09 | Stop reason: HOSPADM

## 2024-02-03 RX ORDER — OLANZAPINE 5 MG/1
5 TABLET, ORALLY DISINTEGRATING ORAL 2 TIMES DAILY
Status: DISCONTINUED | OUTPATIENT
Start: 2024-02-03 | End: 2024-02-09 | Stop reason: HOSPADM

## 2024-02-03 RX ORDER — GAUZE BANDAGE 2" X 2"
100 BANDAGE TOPICAL DAILY
Status: DISCONTINUED | OUTPATIENT
Start: 2024-02-03 | End: 2024-02-03 | Stop reason: SDUPTHER

## 2024-02-03 RX ORDER — SODIUM CHLORIDE 0.9 % (FLUSH) 0.9 %
5-40 SYRINGE (ML) INJECTION EVERY 12 HOURS SCHEDULED
Status: DISCONTINUED | OUTPATIENT
Start: 2024-02-03 | End: 2024-02-09 | Stop reason: HOSPADM

## 2024-02-03 RX ORDER — LORAZEPAM 1 MG/1
3 TABLET ORAL
Status: DISCONTINUED | OUTPATIENT
Start: 2024-02-03 | End: 2024-02-09 | Stop reason: HOSPADM

## 2024-02-03 RX ORDER — SODIUM CHLORIDE 9 MG/ML
INJECTION, SOLUTION INTRAVENOUS PRN
Status: DISCONTINUED | OUTPATIENT
Start: 2024-02-03 | End: 2024-02-09 | Stop reason: HOSPADM

## 2024-02-03 RX ORDER — LORAZEPAM 1 MG/1
2 TABLET ORAL
Status: DISCONTINUED | OUTPATIENT
Start: 2024-02-03 | End: 2024-02-09 | Stop reason: HOSPADM

## 2024-02-03 RX ORDER — LORAZEPAM 2 MG/ML
1 INJECTION INTRAMUSCULAR
Status: DISCONTINUED | OUTPATIENT
Start: 2024-02-03 | End: 2024-02-09 | Stop reason: HOSPADM

## 2024-02-03 RX ORDER — LORAZEPAM 2 MG/ML
4 INJECTION INTRAMUSCULAR
Status: DISCONTINUED | OUTPATIENT
Start: 2024-02-03 | End: 2024-02-09 | Stop reason: HOSPADM

## 2024-02-03 RX ORDER — LORAZEPAM 1 MG/1
1 TABLET ORAL
Status: DISCONTINUED | OUTPATIENT
Start: 2024-02-03 | End: 2024-02-09 | Stop reason: HOSPADM

## 2024-02-03 RX ORDER — LORAZEPAM 1 MG/1
4 TABLET ORAL
Status: DISCONTINUED | OUTPATIENT
Start: 2024-02-03 | End: 2024-02-09 | Stop reason: HOSPADM

## 2024-02-03 RX ORDER — LORAZEPAM 2 MG/ML
2 INJECTION INTRAMUSCULAR
Status: DISCONTINUED | OUTPATIENT
Start: 2024-02-03 | End: 2024-02-09 | Stop reason: HOSPADM

## 2024-02-03 RX ORDER — SODIUM CHLORIDE 0.9 % (FLUSH) 0.9 %
5-40 SYRINGE (ML) INJECTION PRN
Status: DISCONTINUED | OUTPATIENT
Start: 2024-02-03 | End: 2024-02-09 | Stop reason: HOSPADM

## 2024-02-03 RX ADMIN — GABAPENTIN 600 MG: 300 CAPSULE ORAL at 13:29

## 2024-02-03 RX ADMIN — IOPAMIDOL 100 ML: 755 INJECTION, SOLUTION INTRAVENOUS at 15:34

## 2024-02-03 RX ADMIN — SODIUM CHLORIDE, PRESERVATIVE FREE 10 ML: 5 INJECTION INTRAVENOUS at 22:21

## 2024-02-03 RX ADMIN — THIAMINE HCL TAB 100 MG 100 MG: 100 TAB at 09:27

## 2024-02-03 RX ADMIN — HYDROCODONE BITARTRATE AND ACETAMINOPHEN 1 TABLET: 10; 325 TABLET ORAL at 09:27

## 2024-02-03 RX ADMIN — POTASSIUM CHLORIDE 20 MEQ: 1500 TABLET, EXTENDED RELEASE ORAL at 09:27

## 2024-02-03 RX ADMIN — SODIUM CHLORIDE: 4.5 INJECTION, SOLUTION INTRAVENOUS at 13:29

## 2024-02-03 RX ADMIN — PANTOPRAZOLE SODIUM 40 MG: 40 TABLET, DELAYED RELEASE ORAL at 09:27

## 2024-02-03 RX ADMIN — SODIUM CHLORIDE, PRESERVATIVE FREE 10 ML: 5 INJECTION INTRAVENOUS at 09:32

## 2024-02-03 RX ADMIN — Medication 400 MG: at 09:27

## 2024-02-03 RX ADMIN — GABAPENTIN 600 MG: 300 CAPSULE ORAL at 22:13

## 2024-02-03 RX ADMIN — ENOXAPARIN SODIUM 40 MG: 100 INJECTION SUBCUTANEOUS at 09:26

## 2024-02-03 RX ADMIN — PIPERACILLIN AND TAZOBACTAM 3375 MG: 3; .375 INJECTION, POWDER, FOR SOLUTION INTRAVENOUS at 13:29

## 2024-02-03 RX ADMIN — PIPERACILLIN AND TAZOBACTAM 3375 MG: 3; .375 INJECTION, POWDER, FOR SOLUTION INTRAVENOUS at 06:26

## 2024-02-03 RX ADMIN — OLANZAPINE 5 MG: 5 TABLET, ORALLY DISINTEGRATING ORAL at 22:13

## 2024-02-03 RX ADMIN — FOLIC ACID 1 MG: 1 TABLET ORAL at 09:26

## 2024-02-03 RX ADMIN — PIPERACILLIN AND TAZOBACTAM 3375 MG: 3; .375 INJECTION, POWDER, FOR SOLUTION INTRAVENOUS at 22:13

## 2024-02-03 RX ADMIN — GABAPENTIN 600 MG: 300 CAPSULE ORAL at 09:26

## 2024-02-03 ASSESSMENT — PAIN DESCRIPTION - DESCRIPTORS: DESCRIPTORS: ACHING;BURNING

## 2024-02-03 ASSESSMENT — PAIN DESCRIPTION - ORIENTATION: ORIENTATION: RIGHT;LEFT

## 2024-02-03 ASSESSMENT — PAIN SCALES - GENERAL: PAINLEVEL_OUTOF10: 8

## 2024-02-03 ASSESSMENT — PAIN DESCRIPTION - LOCATION: LOCATION: LEG

## 2024-02-03 NOTE — PROGRESS NOTES
Hospitalist Progress Note    NAME:   Checo Dey   : 1965   MRN: [id number]     Date/Time: 2/3/2024 10:12 AM  Patient PCP: None, None    Estimated discharge date: 48 hours  Barriers: Sepsis    Checo Dey is a 58 year old male with a PMH of bilateral lower extremity swelling, alcohol use disorder, neuropathy,  and recurrent cellulitis who was admitted on  for cellulitis not responding to PO antibiotics as outpatient. Associated bilateral leg erythema and tenderness. Reported numbness at baseline. Patient also reports episodes of syncope where he wakes up and does not remember how he got to where he is. CXR no acute processes on . Lactic acid 2.45 to 2.58.     Assessment / Plan:    Severe Sepsis  UA no UTI  CXR no acute processes  Source cellulitis  Tachycardic, tachypneic and hypotensive overnight at 81/50 on   Sepsis IVF bolus given   /90  Afebrile, no leukocytosis  Lactic acid elevated 2.58  Blood cultures no growth x 2 days  0.45% normal saline  Continue vancomycin  Discontinue midodrine due to elevated BP    Bilateral lower extremity swelling  Bilateral lymphedema with venous stasis dermatitis and/or cellulitis  CT LE no evidence of gas or fluid collection bilaterally  Discontinue Zosyn per infectious disease  Continue vancomycin   Continue lasix  Consult wound care  ID following    Syncope  Orthostatic hypotension most likely the etiology   EKG sinus tachycardia, no heart block  Telemetry  CT head pending  ECHO pending  Venous Duplex DVT study pending    Neuropathy  Folate low on previous admissions  Check folate and B12  Continue PO folate  Continue gabapentin     Hypokalemia  K 3.5  Continue PO potassium once daily  Monitor    Macrocytic anemia  Hgb 8.3 to 9.0  Monitor    Visual hallucination  Psychiatry consult    Alcohol use disorder  No signs of withdrawal   Continue thiamine     Medical Decision Making:   I personally reviewed labs: WBC 8.7  I personally reviewed  imaging:  CT TIBIA FIBULA RIGHT WO CONTRAST   Final Result   Diffuse soft tissue edema and swelling, compatible with cellulitis. No definite   fluid collection to suggest abscess. No gas to suggest necrotizing fasciitis.       CT TIBIA FIBULA LEFT WO CONTRAST   Final Result   1. Diffuse subcutaneous edema compatible with the clinical diagnosis of   cellulitis. No abscess. No drainable fluid collection      XR CHEST PORTABLE   Final Result   1. No acute disease          CTA CHEST W WO CONTRAST    (Results Pending)   CT HEAD WO CONTRAST    (Results Pending)      Toxic drug monitoring: bleeding on lovenox  Discussed case with: patient        Code Status: full code  DVT Prophylaxis: lovenox  GI Prophylaxis: Protonix    Subjective:     Chief Complaint / Reason for Physician Visit  Bilateral lower extremity pain is well controlled with pain medication. Discussed with RN events overnight.       Objective:     VITALS:   Last 24hrs VS reviewed since prior progress note. Most recent are:  Patient Vitals for the past 24 hrs:   BP Temp Temp src Pulse Resp SpO2   02/03/24 0745 (!) 145/90 99 °F (37.2 °C) Oral (!) 113 18 100 %   02/02/24 2330 (!) 151/98 97.9 °F (36.6 °C) Oral 97 13 99 %   02/02/24 2035 120/81 98.1 °F (36.7 °C) Oral 99 -- 99 %   02/02/24 1925 -- -- -- -- 16 --   02/02/24 1900 (!) 158/98 -- -- (!) 102 15 --   02/02/24 1100 106/71 -- -- (!) 107 -- 95 %   02/02/24 1030 107/66 -- -- (!) 104 -- 96 %           Intake/Output Summary (Last 24 hours) at 2/3/2024 1012  Last data filed at 2/2/2024 2333  Gross per 24 hour   Intake 500 ml   Output 1700 ml   Net -1200 ml          I had a face to face encounter and independently examined this patient on 2/3/2024, as outlined below:    Review of Systems   Constitutional:  Negative for chills, fatigue and fever.   HENT:  Negative for congestion, rhinorrhea and sore throat.    Respiratory:  Negative for cough and shortness of breath.    Cardiovascular:  Positive for leg swelling.

## 2024-02-03 NOTE — PROGRESS NOTES
Attempted to see pt for PT evaluation; however, transport arrived and was ready to take the pt off the floor for testing. Will continue to follow and evaluate at a later time.

## 2024-02-03 NOTE — PROGRESS NOTES
Hospitalist Progress Note    NAME:   Checo Dey   : 1965   MRN: [id number]     Date/Time: 2/3/2024 2:05 PM  Patient PCP: None, None    Estimated discharge date: 48 hours  Barriers: Sepsis    Checo Dey is a 58 year old male with a PMH of bilateral lower extremity swelling, alcohol use disorder, neuropathy,  and recurrent cellulitis who was admitted on  for cellulitis not responding to PO antibiotics as outpatient. Associated bilateral leg erythema and tenderness. Reported numbness at baseline. Patient also reports episodes of syncope where he wakes up and does not remember how he got to where he is. CXR no acute processes on . Lactic acid 2.45 to 2.58.     Assessment / Plan:    Severe Sepsis  UA no UTI  CXR no acute processes  Source cellulitis  Tachycardic, tachypneic and hypotensive overnight at 81/50 on   Sepsis IVF bolus given   /90  Afebrile, no leukocytosis  Lactic acid elevated 2.58  Blood cultures no growth x 2 days  0.45% normal saline  Continue vancomycin    Bilateral lower extremity swelling  Bilateral lymphedema with venous stasis dermatitis and/or cellulitis  CT LE no evidence of gas or fluid collection bilaterally  Discontinue Zosyn per infectious disease  Continue vancomycin   Continue lasix  Consult wound care  ID following    Syncope  Orthostatic hypotension most likely the etiology   EKG sinus tachycardia, no heart block  Telemetry  CT head pending  ECHO pending  Venous Duplex DVT study pending  CTA of the chest pending  Cardiac consult    Neuropathy  Folate low on previous admissions  Check folate and B12  Continue PO folate  Continue gabapentin     Hypokalemia  K 3.5  Continue PO potassium once daily  Monitor    Macrocytic anemia  Hgb 8.3 to 9.0  Monitor    Visual hallucination  Psychiatry consult    Alcohol use disorder  No signs of withdrawal   Continue thiamine     Medical Decision Making:   I personally reviewed labs: WBC 8.7  I personally reviewed

## 2024-02-03 NOTE — CARE COORDINATION
02/03/24 1434   Service Assessment   Patient Orientation Other (see comment)  (information provided by mother, CM did not assess pt face to face)   Cognition Other (see comment)  (Information provided by pt mother)   History Provided By Child/Family   Primary Caregiver Family   Accompanied By/Relationship n/a   Support Systems Parent   Patient's Healthcare Decision Maker is: Named in Scanned ACP Document   PCP Verified by CM Yes  (Pt last seen by his PCP a few months ago, but pt has switched to a new PCP and first available appt is at end of April.)   Last Visit to PCP Within last 3 months   Prior Functional Level Assistance with the following:;Mobility;Bathing;Dressing;Toileting;Housework;Cooking;Shopping   Current Functional Level Assistance with the following:;Mobility;Bathing;Dressing;Toileting;Cooking;Housework;Shopping   Can patient return to prior living arrangement Unknown at present  (Will appreciate PT/OT evals and recs)   Ability to make needs known: Other (see comment)  (Information provided by pt mother)   Family able to assist with home care needs: No  (Mother reports she is having difficulties caring for pt in home setting.)   Would you like for me to discuss the discharge plan with any other family members/significant others, and if so, who? Yes   Financial Resources Medicaid   Community Resources None   CM/SW Referral Other (see comment)  (discharge planning)   Social/Functional History   Lives With Parent   Type of Home House   Home Layout One level   Home Equipment Cane   ADL Assistance Needs assistance   Bath Moderate assistance   Dressing Moderate assistance   Grooming Maximum assistance   Feeding Independent   Toileting Needs assistance   Ambulation Assistance Needs assistance   Discharge Planning   Living Arrangements Parent   Potential Assistance Needed Durable Medical Equipment   Patient expects to be discharged to: Acute rehab     CM attempted to contact pt directly, but no answer in pt

## 2024-02-04 ENCOUNTER — APPOINTMENT (OUTPATIENT)
Facility: HOSPITAL | Age: 59
DRG: 720 | End: 2024-02-04
Payer: MEDICAID

## 2024-02-04 LAB
ALBUMIN SERPL-MCNC: 1.6 G/DL (ref 3.5–5)
ALBUMIN/GLOB SERPL: 0.6 (ref 1.1–2.2)
ALP SERPL-CCNC: 124 U/L (ref 45–117)
ALT SERPL-CCNC: 9 U/L (ref 12–78)
ANION GAP SERPL CALC-SCNC: 4 MMOL/L (ref 5–15)
ANION GAP SERPL CALC-SCNC: 4 MMOL/L (ref 5–15)
AST SERPL W P-5'-P-CCNC: 27 U/L (ref 15–37)
BASOPHILS # BLD: 0 K/UL (ref 0–0.1)
BASOPHILS NFR BLD: 0 % (ref 0–1)
BILIRUB SERPL-MCNC: 0.6 MG/DL (ref 0.2–1)
BUN SERPL-MCNC: 3 MG/DL (ref 6–20)
BUN SERPL-MCNC: 3 MG/DL (ref 6–20)
BUN/CREAT SERPL: 6 (ref 12–20)
BUN/CREAT SERPL: 7 (ref 12–20)
CA-I BLD-MCNC: 8 MG/DL (ref 8.5–10.1)
CA-I BLD-MCNC: 8.2 MG/DL (ref 8.5–10.1)
CHLORIDE SERPL-SCNC: 108 MMOL/L (ref 97–108)
CHLORIDE SERPL-SCNC: 109 MMOL/L (ref 97–108)
CO2 SERPL-SCNC: 26 MMOL/L (ref 21–32)
CO2 SERPL-SCNC: 27 MMOL/L (ref 21–32)
CREAT SERPL-MCNC: 0.46 MG/DL (ref 0.7–1.3)
CREAT SERPL-MCNC: 0.48 MG/DL (ref 0.7–1.3)
CRP SERPL-MCNC: 3.65 MG/DL (ref 0–0.3)
DIFFERENTIAL METHOD BLD: ABNORMAL
ECHO BSA: 1.95 M2
EOSINOPHIL # BLD: 0 K/UL (ref 0–0.4)
EOSINOPHIL NFR BLD: 0 % (ref 0–7)
ERYTHROCYTE [DISTWIDTH] IN BLOOD BY AUTOMATED COUNT: 17.3 % (ref 11.5–14.5)
FLUAV AG NPH QL IA: NEGATIVE
FLUBV AG NOSE QL IA: NEGATIVE
GLOBULIN SER CALC-MCNC: 2.9 G/DL (ref 2–4)
GLUCOSE SERPL-MCNC: 76 MG/DL (ref 65–100)
GLUCOSE SERPL-MCNC: 78 MG/DL (ref 65–100)
HCT VFR BLD AUTO: 26 % (ref 36.6–50.3)
HGB BLD-MCNC: 8.9 G/DL (ref 12.1–17)
IMM GRANULOCYTES # BLD AUTO: 0 K/UL (ref 0–0.04)
IMM GRANULOCYTES NFR BLD AUTO: 0 % (ref 0–0.5)
LYMPHOCYTES # BLD: 1.7 K/UL (ref 0.8–3.5)
LYMPHOCYTES NFR BLD: 23 % (ref 12–49)
MCH RBC QN AUTO: 34.2 PG (ref 26–34)
MCHC RBC AUTO-ENTMCNC: 34.2 G/DL (ref 30–36.5)
MCV RBC AUTO: 100 FL (ref 80–99)
MONOCYTES # BLD: 0.8 K/UL (ref 0–1)
MONOCYTES NFR BLD: 11 % (ref 5–13)
NEUTS SEG # BLD: 4.6 K/UL (ref 1.8–8)
NEUTS SEG NFR BLD: 66 % (ref 32–75)
NRBC # BLD: 0 K/UL (ref 0–0.01)
NRBC BLD-RTO: 0 PER 100 WBC
PLATELET # BLD AUTO: 356 K/UL (ref 150–400)
PMV BLD AUTO: 9.3 FL (ref 8.9–12.9)
POTASSIUM SERPL-SCNC: 3.7 MMOL/L (ref 3.5–5.1)
POTASSIUM SERPL-SCNC: 3.7 MMOL/L (ref 3.5–5.1)
PROCALCITONIN SERPL-MCNC: 0.25 NG/ML
PROT SERPL-MCNC: 4.5 G/DL (ref 6.4–8.2)
RBC # BLD AUTO: 2.6 M/UL (ref 4.1–5.7)
SARS-COV-2 RDRP RESP QL NAA+PROBE: NOT DETECTED
SODIUM SERPL-SCNC: 139 MMOL/L (ref 136–145)
SODIUM SERPL-SCNC: 139 MMOL/L (ref 136–145)
WBC # BLD AUTO: 7.1 K/UL (ref 4.1–11.1)

## 2024-02-04 PROCEDURE — 6370000000 HC RX 637 (ALT 250 FOR IP): Performed by: INTERNAL MEDICINE

## 2024-02-04 PROCEDURE — 2580000003 HC RX 258: Performed by: INTERNAL MEDICINE

## 2024-02-04 PROCEDURE — 6370000000 HC RX 637 (ALT 250 FOR IP): Performed by: PSYCHIATRY & NEUROLOGY

## 2024-02-04 PROCEDURE — 84145 PROCALCITONIN (PCT): CPT

## 2024-02-04 PROCEDURE — 86140 C-REACTIVE PROTEIN: CPT

## 2024-02-04 PROCEDURE — 6360000002 HC RX W HCPCS: Performed by: INTERNAL MEDICINE

## 2024-02-04 PROCEDURE — 80053 COMPREHEN METABOLIC PANEL: CPT

## 2024-02-04 PROCEDURE — 80048 BASIC METABOLIC PNL TOTAL CA: CPT

## 2024-02-04 PROCEDURE — 85025 COMPLETE CBC W/AUTO DIFF WBC: CPT

## 2024-02-04 PROCEDURE — 97530 THERAPEUTIC ACTIVITIES: CPT

## 2024-02-04 PROCEDURE — 82607 VITAMIN B-12: CPT

## 2024-02-04 PROCEDURE — 87804 INFLUENZA ASSAY W/OPTIC: CPT

## 2024-02-04 PROCEDURE — 87635 SARS-COV-2 COVID-19 AMP PRB: CPT

## 2024-02-04 PROCEDURE — 97161 PT EVAL LOW COMPLEX 20 MIN: CPT

## 2024-02-04 PROCEDURE — 6360000002 HC RX W HCPCS: Performed by: PHYSICIAN ASSISTANT

## 2024-02-04 PROCEDURE — 36415 COLL VENOUS BLD VENIPUNCTURE: CPT

## 2024-02-04 PROCEDURE — 2580000003 HC RX 258: Performed by: PHYSICIAN ASSISTANT

## 2024-02-04 PROCEDURE — 2060000000 HC ICU INTERMEDIATE R&B

## 2024-02-04 PROCEDURE — 82746 ASSAY OF FOLIC ACID SERUM: CPT

## 2024-02-04 PROCEDURE — 93970 EXTREMITY STUDY: CPT

## 2024-02-04 PROCEDURE — 99232 SBSQ HOSP IP/OBS MODERATE 35: CPT | Performed by: INTERNAL MEDICINE

## 2024-02-04 RX ADMIN — THIAMINE HCL TAB 100 MG 100 MG: 100 TAB at 08:58

## 2024-02-04 RX ADMIN — PANTOPRAZOLE SODIUM 40 MG: 40 TABLET, DELAYED RELEASE ORAL at 08:57

## 2024-02-04 RX ADMIN — PIPERACILLIN AND TAZOBACTAM 3375 MG: 3; .375 INJECTION, POWDER, FOR SOLUTION INTRAVENOUS at 04:52

## 2024-02-04 RX ADMIN — SODIUM CHLORIDE, PRESERVATIVE FREE 10 ML: 5 INJECTION INTRAVENOUS at 08:57

## 2024-02-04 RX ADMIN — SODIUM CHLORIDE, PRESERVATIVE FREE 10 ML: 5 INJECTION INTRAVENOUS at 22:51

## 2024-02-04 RX ADMIN — LORAZEPAM 2 MG: 2 INJECTION INTRAMUSCULAR; INTRAVENOUS at 22:57

## 2024-02-04 RX ADMIN — ACETAMINOPHEN 650 MG: 325 TABLET ORAL at 22:57

## 2024-02-04 RX ADMIN — GABAPENTIN 600 MG: 300 CAPSULE ORAL at 13:29

## 2024-02-04 RX ADMIN — Medication 400 MG: at 08:57

## 2024-02-04 RX ADMIN — OLANZAPINE 5 MG: 5 TABLET, ORALLY DISINTEGRATING ORAL at 17:23

## 2024-02-04 RX ADMIN — AZITHROMYCIN MONOHYDRATE 500 MG: 500 INJECTION, POWDER, LYOPHILIZED, FOR SOLUTION INTRAVENOUS at 15:11

## 2024-02-04 RX ADMIN — GABAPENTIN 600 MG: 300 CAPSULE ORAL at 22:01

## 2024-02-04 RX ADMIN — SODIUM CHLORIDE, PRESERVATIVE FREE 10 ML: 5 INJECTION INTRAVENOUS at 08:59

## 2024-02-04 RX ADMIN — GABAPENTIN 600 MG: 300 CAPSULE ORAL at 08:57

## 2024-02-04 RX ADMIN — VANCOMYCIN HYDROCHLORIDE 1250 MG: 1.25 INJECTION, POWDER, LYOPHILIZED, FOR SOLUTION INTRAVENOUS at 22:00

## 2024-02-04 RX ADMIN — VANCOMYCIN HYDROCHLORIDE 2000 MG: 1 INJECTION, POWDER, LYOPHILIZED, FOR SOLUTION INTRAVENOUS at 08:58

## 2024-02-04 RX ADMIN — ENOXAPARIN SODIUM 40 MG: 100 INJECTION SUBCUTANEOUS at 08:58

## 2024-02-04 RX ADMIN — HYDROCODONE BITARTRATE AND ACETAMINOPHEN 1 TABLET: 10; 325 TABLET ORAL at 08:57

## 2024-02-04 RX ADMIN — SODIUM CHLORIDE, PRESERVATIVE FREE 10 ML: 5 INJECTION INTRAVENOUS at 22:01

## 2024-02-04 RX ADMIN — OLANZAPINE 5 MG: 5 TABLET, ORALLY DISINTEGRATING ORAL at 08:57

## 2024-02-04 RX ADMIN — POTASSIUM CHLORIDE 20 MEQ: 1500 TABLET, EXTENDED RELEASE ORAL at 08:57

## 2024-02-04 RX ADMIN — MIDODRINE HYDROCHLORIDE 2.5 MG: 5 TABLET ORAL at 08:58

## 2024-02-04 RX ADMIN — FOLIC ACID 1 MG: 1 TABLET ORAL at 08:58

## 2024-02-04 RX ADMIN — MIDODRINE HYDROCHLORIDE 2.5 MG: 5 TABLET ORAL at 17:23

## 2024-02-04 ASSESSMENT — PAIN SCALES - GENERAL
PAINLEVEL_OUTOF10: 0
PAINLEVEL_OUTOF10: 0

## 2024-02-04 NOTE — PROGRESS NOTES
Hospitalist Progress Note    NAME:   Checo Dey   : 1965   MRN: [id number]     Date/Time: 2024 2:11 PM  Patient PCP: None, None    Estimated discharge date: 48 hours  Barriers: Sepsis    Checo Dey is a 58 year old male with a PMH of bilateral lower extremity swelling, alcohol use disorder, neuropathy,  and recurrent cellulitis who was admitted on  for cellulitis not responding to PO antibiotics as outpatient. Associated bilateral leg erythema and tenderness. Reported numbness at baseline. Patient also reports episodes of syncope where he wakes up and does not remember how he got to where he is. CXR no acute processes on .  ID consultation and vancomycin initiated.  Legs have improved.  Due to syncope syncope workup to include echocardiogram and cardiac consultation.  CTA of the chest revealed no obvious pulmonary emboli although there is patchy infiltrates consistent with atypical pneumonia.  ID monitoring closely.  COVID and influenza negative.    Assessment / Plan:    Severe Sepsis  UA no UTI  CXR no acute processes  Source cellulitis  Tachycardic, tachypneic and hypotensive overnight at 81/50 on   Sepsis IVF bolus given   /90  Afebrile, no leukocytosis  Lactic acid elevated 2.58  Blood cultures no growth x 2 days  0.45% normal saline  Continue vancomycin    Bilateral lower extremity swelling  Bilateral lymphedema with venous stasis dermatitis and/or cellulitis  CT LE no evidence of gas or fluid collection bilaterally  Discontinue Zosyn per infectious disease  Continue vancomycin   Continue lasix  Consult wound care  ID following    Syncope  Orthostatic hypotension most likely the etiology   EKG sinus tachycardia, no heart block  Telemetry  CT head neg  ECHO pending  Venous Duplex DVT study pending  CTA of the chest no PE although groundglass opacities noted possible pulmonary edema versus multifocal pneumonia.  Cardiac consult    Neuropathy  Folate low on previous

## 2024-02-04 NOTE — BH NOTE
Consult Note              Reason for psychiatric consult-visual hallucination      Consult Date: 2/3/2024    Consults-visual hallucinations    Mr. Dey is a 58-year-old male with a history of bilateral lymphedema, recurrent cellulitis, bilateral lower extremity swelling is consulted for visual hallucinations.  Patient is currently on the medical floor for further stabilization.  Patient also with reported alcohol use disorder.  Did not have alcohol level and on alcohol withdrawal protocol based on [facility]    Patient seen this morning on the medical floor who is being followed for sepsis, bilateral lymphedema, with lower extremity swelling.  Patient presents anxious labile.  He does report visual hallucinations of things happening at home, seeing people and that he checked with his family and they do not see what he was saying.  He expresses that is continuing to worsen.  Denies any auditory hallucinations.    Mental status examination-patient is alert and oriented to name, place, his reason for hospitalization.  Expresses visual hallucinations.  Denies any auditory hallucinations.    Subjective     Past Medical History:   Diagnosis Date    Bilateral lower extremity edema     Gastrointestinal disorder     Hypertension       No past surgical history on file.  No family history on file.   Social History     Tobacco Use    Smoking status: Every Day     Current packs/day: 0.50     Types: Cigarettes    Smokeless tobacco: Never   Substance Use Topics    Alcohol use: Yes     Comment: 1-2 drinks per day       Allergies   Allergen Reactions    Other      Dove Feathers     Current Facility-Administered Medications   Medication Dose Route Frequency Provider Last Rate Last Admin    sodium chloride flush 0.9 % injection 5-40 mL  5-40 mL IntraVENous 2 times per day Terry Zamarripa PA-C        sodium chloride flush 0.9 % injection 5-40 mL  5-40 mL IntraVENous PRN Terry Zamarripa PA-C        0.9 % sodium chloride infusion    use disorder    On alcohol withdrawal protocol  On thiamine and folic acid    Will start Zyprexa Zydis 5 mg p.o. twice daily and we will continue to taper as  showing progress  Will follow-up      Current Facility-Administered Medications:     sodium chloride flush 0.9 % injection 5-40 mL, 5-40 mL, IntraVENous, 2 times per day, Terry Zamarripa PA-C    sodium chloride flush 0.9 % injection 5-40 mL, 5-40 mL, IntraVENous, PRN, Terry Zamarripa PA-C    0.9 % sodium chloride infusion, , IntraVENous, PRN, Terry Zamarripa PA-C    LORazepam (ATIVAN) tablet 1 mg, 1 mg, Oral, Q1H PRN **OR** LORazepam (ATIVAN) injection 1 mg, 1 mg, IntraVENous, Q1H PRN **OR** LORazepam (ATIVAN) tablet 2 mg, 2 mg, Oral, Q1H PRN **OR** LORazepam (ATIVAN) injection 2 mg, 2 mg, IntraVENous, Q1H PRN **OR** LORazepam (ATIVAN) tablet 3 mg, 3 mg, Oral, Q1H PRN **OR** LORazepam (ATIVAN) injection 3 mg, 3 mg, IntraVENous, Q1H PRN **OR** LORazepam (ATIVAN) tablet 4 mg, 4 mg, Oral, Q1H PRN **OR** LORazepam (ATIVAN) injection 4 mg, 4 mg, IntraVENous, Q1H PRN, Terry Zamarripa PA-C    OLANZapine zydis (ZYPREXA) disintegrating tablet 5 mg, 5 mg, Oral, BID, Claritza Carrasco MD    folic acid (FOLVITE) tablet 1 mg, 1 mg, Oral, Daily, Michelle, Rocco Hammonds MD, 1 mg at 02/03/24 0926    0.45 % sodium chloride infusion, , IntraVENous, Continuous, Terry Zamarripa PA-C, Last Rate: 100 mL/hr at 02/03/24 1329, New Bag at 02/03/24 1329    [COMPLETED] piperacillin-tazobactam (ZOSYN) 4,500 mg in sodium chloride 0.9 % 100 mL IVPB (mini-bag), 4,500 mg, IntraVENous, Once, Stopped at 02/02/24 2220 **FOLLOWED BY** piperacillin-tazobactam (ZOSYN) 3,375 mg in sodium chloride 0.9 % 50 mL IVPB (mini-bag), 3,375 mg, IntraVENous, Q8H, Terry Zamarripa PA-C, Stopped at 02/03/24 1727    gabapentin (NEURONTIN) capsule 600 mg, 600 mg, Oral, TID, Rocco Martinez MD, 600 mg at 02/03/24 1329    magnesium oxide (MAG-OX) tablet 400 mg, 400 mg, Oral, Daily, Rocco Martinez MD, 400 mg

## 2024-02-04 NOTE — PLAN OF CARE
PHYSICAL THERAPY EVALUATION  Patient: Checo Dey (58 y.o. male)  Date: 2/4/2024  Primary Diagnosis: Sepsis ([facility]) [A41.9]  Severe sepsis ([facility]) [A41.9, R65.20]  Cellulitis of lower extremity, unspecified laterality [L03.119]       Precautions: Fall Risk                Recommendations for nursing mobility: Out of bed to chair for meals, Frequent repositioning to prevent skin breakdown, Use of bed/chair alarm for safety, LE elevation for management of edema, Use of BSC for toileting , Farideh Stedy for bed to chair transfers , and Assist x1    In place during session:     ASSESSMENT  Pt is a 58 y.o. male admitted on 2/1/2024 for sepsis/cellulitis B LE; currently presents to PT with decreased bed mob, transfers, LE strength, gt, balance, activity tolerance, and overall functional mobility . Pt semi supine in bed upon PT arrival, agreeable to evaluation. Pt A&O to self/place/birthday/situation, confused to year.  Pt lives with his mother, reports independence in mobility up until a couple of weeks ago and has progressively gotten weaker.  Does not usually use AD for ambulation.     Based on the objective data described below, the patient currently presents with impaired functional mobility, decreased ROM, impaired strength, decreased activity tolerance, impaired balance, and increased pain levels. (See below for objective details and assist levels).     Overall pt tolerated session fair today with overall min/mod A with bed mob and OOB transfers.  Pt was able to take 4 steps with UE support on RW and min Ax1. Pt demos decreased foot clearance, inc B LE pain with attempt, therefore deferred further amb at time of session. Increased redness and edema noted B LE.   Pt will benefit from continued skilled PT to address above deficits and return to PLOF.Current PT DC recommendation [facility]patient [facility] Facility  once medically appropriate as pt was recently independent in ADLs and mobility.       GOALS:    Problem:

## 2024-02-04 NOTE — PROGRESS NOTES
Vancomycin Dosing Consult  Checo Dey is a 58 y.o. male with SSTI (cellulitis). Pharmacy was consulted by Dr. Redd to dose and monitor vancomycin. Today is day 0.    Antibiotic regimen: Vancomycin monotherapy    Temp (24hrs), Av.1 °F (36.7 °C), Min:97.5 °F (36.4 °C), Max:98.9 °F (37.2 °C)    Recent Labs     24  0510 24  0240 24  0707   WBC 8.2 6.5 8.7 7.1   CREATININE 0.48* 0.48* 0.69*  --    BUN 3* 3* 4*  --      Est CrCl: 111 mL/min  Concomitant nephrotoxic drugs: NSAIDs and Contrast agents    Cultures:    Blood: NGTD x 3 days    MRSA Swab: Not ordered, patient already received first dose of vancomycin    Target range: AUC/WENDY 400-600    Recent level history:  Date/Time Dose & Interval Measured Level (mcg/mL) Associated AUC/WENDY              Assessment/Plan:   Pt with hx of venous stasis dermatitis with b/l cellulitis. Vitals otherwise stable except for tachycardic this AM. Pharmacy consulted to dose vancomycin  Renal function appears to be normal, awaiting labs for today  Loading dose of vancomycin 2000mg x 1 dose given this AM  Start scheduled regimen of vancomycin 1250mg q12h tonight (predicted AUC of 481)  Level scheduled for  @ 0600  CMP already ordered through   Antimicrobial stop date: 7 days (per ID)

## 2024-02-04 NOTE — PROGRESS NOTES
Progress Note  Date:2024       Room:[facility] [place]  Patient Name:Checo Dey     YOB: 1965     Age:58 y.o.        Subjective    Subjective  Patient followed for bilateral lower extremity cellulitis.  No wound cultures available but blood cultures negative so far.  Patient still complaining about pain in his legs. Currently on IV Vancomycin. He has low grade temperatures. CRP and procal elevated.  CT Head with no acute intracranial process.  CT Chest with no PE but ground glass changes.     Vitals Last 24 Hours:  TEMPERATURE:  Temp  Av.3 °F (36.8 °C)  Min: 97.5 °F (36.4 °C)  Max: 99 °F (37.2 °C)  RESPIRATIONS RANGE: Resp  Av.2  Min: 13  Max: 18  PULSE OXIMETRY RANGE: SpO2  Av.7 %  Min: 97 %  Max: 100 %  PULSE RANGE: Pulse  Av.8  Min: 105  Max: 124  BLOOD PRESSURE RANGE: Systolic (24hrs), Av , Min:131 , Max:163   ; Diastolic (24hrs), Av, Min:84, Max:103       Objective    Vitals and nursing note reviewed.   Constitutional:       Appearance: He is ill-appearing.   Abdominal:      General: Bowel sounds are normal. There is no distension.   Genitourinary:     Comments: External urinary device with clear urine  Musculoskeletal:      Cervical back: Neck supple.      Right lower leg: Edema present.      Left lower leg: Edema present.        Legs:       Comments: Decreased right calf edema >Left calf edema with decreasing R>L erythema that is homogeneous, no open wounds or drainage   Skin:     Findings: Erythema present. No rash.   Neurological:      General: No focal deficit present.      Mental Status: He is alert and oriented to person, place, and time.   Psychiatric:         Mood and Affect: Mood normal.         Thought Content: Thought content normal.         Judgment: Judgment normal.      Labs/Imaging/Diagnostics    Labs:  CBC:  Recent Labs     24  2051 24  0510 24  0240   WBC 8.2 6.5 8.7   RBC 2.63* 2.41* 2.61*   HGB 8.9* 8.3* 9.0*   HCT 25.8* 24.1* 26.5*  Covid-19/Influenza given CT findings.     Plan   1.  Continue IV Vancomycin  2. In am, repeat procal, CRP  3. Follow-up ASO and Dnase B antibody  4. Follow-up blood cultures  5. Screen for Covid-19/influenza    Electronically signed by Stevie Redd MD

## 2024-02-05 ENCOUNTER — APPOINTMENT (OUTPATIENT)
Facility: HOSPITAL | Age: 59
DRG: 720 | End: 2024-02-05
Payer: MEDICAID

## 2024-02-05 PROBLEM — L03.119 CELLULITIS OF LOWER EXTREMITY: Status: ACTIVE | Noted: 2023-12-20

## 2024-02-05 PROBLEM — J18.9 MULTIFOCAL PNEUMONIA: Status: ACTIVE | Noted: 2024-02-05

## 2024-02-05 LAB
ALBUMIN SERPL-MCNC: 1.6 G/DL (ref 3.5–5)
ALBUMIN/GLOB SERPL: 0.5 (ref 1.1–2.2)
ALP SERPL-CCNC: 124 U/L (ref 45–117)
ALT SERPL-CCNC: 8 U/L (ref 12–78)
ANION GAP SERPL CALC-SCNC: 4 MMOL/L (ref 5–15)
AST SERPL W P-5'-P-CCNC: 29 U/L (ref 15–37)
BASOPHILS # BLD: 0 K/UL (ref 0–0.1)
BASOPHILS NFR BLD: 0 % (ref 0–1)
BILIRUB SERPL-MCNC: 0.6 MG/DL (ref 0.2–1)
BUN SERPL-MCNC: 3 MG/DL (ref 6–20)
BUN/CREAT SERPL: 9 (ref 12–20)
CA-I BLD-MCNC: 8 MG/DL (ref 8.5–10.1)
CHLORIDE SERPL-SCNC: 110 MMOL/L (ref 97–108)
CO2 SERPL-SCNC: 26 MMOL/L (ref 21–32)
CREAT SERPL-MCNC: 0.32 MG/DL (ref 0.7–1.3)
CRP SERPL-MCNC: 3.49 MG/DL (ref 0–0.3)
DIFFERENTIAL METHOD BLD: ABNORMAL
EOSINOPHIL # BLD: 0 K/UL (ref 0–0.4)
EOSINOPHIL NFR BLD: 0 % (ref 0–7)
ERYTHROCYTE [DISTWIDTH] IN BLOOD BY AUTOMATED COUNT: 17.4 % (ref 11.5–14.5)
FOLATE SERPL-MCNC: 9.7 NG/ML (ref 5–21)
GLOBULIN SER CALC-MCNC: 3 G/DL (ref 2–4)
GLUCOSE SERPL-MCNC: 76 MG/DL (ref 65–100)
HCT VFR BLD AUTO: 26.2 % (ref 36.6–50.3)
HGB BLD-MCNC: 8.7 G/DL (ref 12.1–17)
IMM GRANULOCYTES # BLD AUTO: 0.1 K/UL (ref 0–0.04)
IMM GRANULOCYTES NFR BLD AUTO: 1 % (ref 0–0.5)
LACTATE SERPL-SCNC: 1.4 MMOL/L (ref 0.4–2)
LYMPHOCYTES # BLD: 2.5 K/UL (ref 0.8–3.5)
LYMPHOCYTES NFR BLD: 28 % (ref 12–49)
MCH RBC QN AUTO: 33.7 PG (ref 26–34)
MCHC RBC AUTO-ENTMCNC: 33.2 G/DL (ref 30–36.5)
MCV RBC AUTO: 101.6 FL (ref 80–99)
MONOCYTES # BLD: 1 K/UL (ref 0–1)
MONOCYTES NFR BLD: 12 % (ref 5–13)
NEUTS SEG # BLD: 5.3 K/UL (ref 1.8–8)
NEUTS SEG NFR BLD: 59 % (ref 32–75)
NRBC # BLD: 0 K/UL (ref 0–0.01)
NRBC BLD-RTO: 0 PER 100 WBC
PLATELET # BLD AUTO: 417 K/UL (ref 150–400)
PMV BLD AUTO: 9.2 FL (ref 8.9–12.9)
POTASSIUM SERPL-SCNC: 3.8 MMOL/L (ref 3.5–5.1)
PROCALCITONIN SERPL-MCNC: 0.28 NG/ML
PROT SERPL-MCNC: 4.6 G/DL (ref 6.4–8.2)
RBC # BLD AUTO: 2.58 M/UL (ref 4.1–5.7)
SODIUM SERPL-SCNC: 140 MMOL/L (ref 136–145)
VANCOMYCIN SERPL-MCNC: 17.5 UG/ML
VIT B12 SERPL-MCNC: 300 PG/ML (ref 193–986)
WBC # BLD AUTO: 8.9 K/UL (ref 4.1–11.1)

## 2024-02-05 PROCEDURE — 83605 ASSAY OF LACTIC ACID: CPT

## 2024-02-05 PROCEDURE — 80053 COMPREHEN METABOLIC PANEL: CPT

## 2024-02-05 PROCEDURE — 85025 COMPLETE CBC W/AUTO DIFF WBC: CPT

## 2024-02-05 PROCEDURE — 6370000000 HC RX 637 (ALT 250 FOR IP): Performed by: PSYCHIATRY & NEUROLOGY

## 2024-02-05 PROCEDURE — 6370000000 HC RX 637 (ALT 250 FOR IP): Performed by: PHYSICIAN ASSISTANT

## 2024-02-05 PROCEDURE — 6360000002 HC RX W HCPCS: Performed by: INTERNAL MEDICINE

## 2024-02-05 PROCEDURE — 2060000000 HC ICU INTERMEDIATE R&B

## 2024-02-05 PROCEDURE — 97165 OT EVAL LOW COMPLEX 30 MIN: CPT

## 2024-02-05 PROCEDURE — 2580000003 HC RX 258: Performed by: INTERNAL MEDICINE

## 2024-02-05 PROCEDURE — 97530 THERAPEUTIC ACTIVITIES: CPT

## 2024-02-05 PROCEDURE — 6360000002 HC RX W HCPCS: Performed by: PHYSICIAN ASSISTANT

## 2024-02-05 PROCEDURE — 86140 C-REACTIVE PROTEIN: CPT

## 2024-02-05 PROCEDURE — 84145 PROCALCITONIN (PCT): CPT

## 2024-02-05 PROCEDURE — 93321 DOPPLER ECHO F-UP/LMTD STD: CPT

## 2024-02-05 PROCEDURE — 36415 COLL VENOUS BLD VENIPUNCTURE: CPT

## 2024-02-05 PROCEDURE — 80202 ASSAY OF VANCOMYCIN: CPT

## 2024-02-05 PROCEDURE — 2580000003 HC RX 258: Performed by: PHYSICIAN ASSISTANT

## 2024-02-05 PROCEDURE — 6370000000 HC RX 637 (ALT 250 FOR IP): Performed by: INTERNAL MEDICINE

## 2024-02-05 RX ORDER — OLANZAPINE 5 MG/1
5 TABLET, ORALLY DISINTEGRATING ORAL 2 TIMES DAILY
Qty: 30 TABLET | Refills: 3 | Status: SHIPPED | OUTPATIENT
Start: 2024-02-05

## 2024-02-05 RX ORDER — AZITHROMYCIN 500 MG/1
500 TABLET, FILM COATED ORAL DAILY
Qty: 1 PACKET | Refills: 0 | Status: SHIPPED | OUTPATIENT
Start: 2024-02-05 | End: 2024-02-08

## 2024-02-05 RX ORDER — DOXYCYCLINE HYCLATE 100 MG/1
100 CAPSULE ORAL 2 TIMES DAILY
Qty: 42 CAPSULE | Refills: 0 | Status: SHIPPED | OUTPATIENT
Start: 2024-02-05 | End: 2024-02-26

## 2024-02-05 RX ADMIN — SODIUM CHLORIDE, PRESERVATIVE FREE 10 ML: 5 INJECTION INTRAVENOUS at 21:32

## 2024-02-05 RX ADMIN — ENOXAPARIN SODIUM 40 MG: 100 INJECTION SUBCUTANEOUS at 08:23

## 2024-02-05 RX ADMIN — LORAZEPAM 1 MG: 2 INJECTION INTRAMUSCULAR; INTRAVENOUS at 01:22

## 2024-02-05 RX ADMIN — SODIUM CHLORIDE: 4.5 INJECTION, SOLUTION INTRAVENOUS at 01:23

## 2024-02-05 RX ADMIN — VANCOMYCIN HYDROCHLORIDE 1250 MG: 1.25 INJECTION, POWDER, LYOPHILIZED, FOR SOLUTION INTRAVENOUS at 08:25

## 2024-02-05 RX ADMIN — OLANZAPINE 5 MG: 5 TABLET, ORALLY DISINTEGRATING ORAL at 08:26

## 2024-02-05 RX ADMIN — THIAMINE HCL TAB 100 MG 100 MG: 100 TAB at 08:26

## 2024-02-05 RX ADMIN — GABAPENTIN 600 MG: 300 CAPSULE ORAL at 08:26

## 2024-02-05 RX ADMIN — SODIUM CHLORIDE, PRESERVATIVE FREE 10 ML: 5 INJECTION INTRAVENOUS at 08:26

## 2024-02-05 RX ADMIN — PANTOPRAZOLE SODIUM 40 MG: 40 TABLET, DELAYED RELEASE ORAL at 08:26

## 2024-02-05 RX ADMIN — Medication 400 MG: at 08:26

## 2024-02-05 RX ADMIN — AZITHROMYCIN MONOHYDRATE 500 MG: 500 INJECTION, POWDER, LYOPHILIZED, FOR SOLUTION INTRAVENOUS at 14:09

## 2024-02-05 RX ADMIN — LORAZEPAM 2 MG: 2 INJECTION INTRAMUSCULAR; INTRAVENOUS at 19:18

## 2024-02-05 RX ADMIN — SODIUM CHLORIDE, PRESERVATIVE FREE 10 ML: 5 INJECTION INTRAVENOUS at 08:24

## 2024-02-05 RX ADMIN — FOLIC ACID 1 MG: 1 TABLET ORAL at 08:26

## 2024-02-05 RX ADMIN — LORAZEPAM 2 MG: 1 TABLET ORAL at 13:58

## 2024-02-05 RX ADMIN — OLANZAPINE 5 MG: 5 TABLET, ORALLY DISINTEGRATING ORAL at 16:59

## 2024-02-05 RX ADMIN — POTASSIUM CHLORIDE 20 MEQ: 1500 TABLET, EXTENDED RELEASE ORAL at 08:25

## 2024-02-05 RX ADMIN — GABAPENTIN 600 MG: 300 CAPSULE ORAL at 21:23

## 2024-02-05 RX ADMIN — GABAPENTIN 600 MG: 300 CAPSULE ORAL at 14:09

## 2024-02-05 RX ADMIN — VANCOMYCIN HYDROCHLORIDE 1250 MG: 1.25 INJECTION, POWDER, LYOPHILIZED, FOR SOLUTION INTRAVENOUS at 21:29

## 2024-02-05 NOTE — WOUND CARE
Wound Care Note:      Wound Care into see patient because of Lower extremity wounds    Patient resting in bed, states that he is having some pain to both his legs but feels like the swelling has improved. Cellulitis to bilateral lower legs, skin is intact a this time with no drainage noted. Bilateral legs are warm to touch and are edematous and erythematous. Areas of thickened dry skin noted to left anterior foot near toes, skin is intact with no drainage noted at this time. Recommend elevating legs on pillows while in bed/chair. This was done after assessment and patient stated that made his legs feel better.       Recommend applying sacral foam dressing to sacrum for protection. Dressing to be changed every 3 days and PRN for soiling, nurse to peel back dressing daily for skin assessment.     Float heels on pillow at all times while in bed, place pillow long ways under patient leg so knee is supported and heel is hanging off edge of pillow.    Boot are to be applied daily and kept on at all times while in bed for offloading and to prevent pressure injuries.      Use foam body alignment wedge to turn patient q2h at 30 degree angle to offload sacrum to prevent pressure related skin injury.  Do not position directly on greater trochanter.         Please re-consult for any changes in skin condition.

## 2024-02-05 NOTE — PROGRESS NOTES
Progress Note  Date:2024       Room:[facility] [place]  Patient Name:Checo Dey     YOB: 1965     Age:58 y.o.        Subjective    Subjective  Patient followed for bilateral lower extremity cellulitis.  No wound cultures available but blood cultures negative so far.  Patient still complaining about pain in his legs. Currently on IV Vancomycin. He is afebrile.  CRP and procal decreasing. CT Chest showed groundglass changes in lung, however, SARS CoV-2 negative. No new complaints.      Vitals Last 24 Hours:  TEMPERATURE:  Temp  Av.2 °F (36.8 °C)  Min: 97.5 °F (36.4 °C)  Max: 99.9 °F (37.7 °C)  RESPIRATIONS RANGE: Resp  Av.9  Min: 13  Max: 20  PULSE OXIMETRY RANGE: SpO2  Av.5 %  Min: 95 %  Max: 100 %  PULSE RANGE: Pulse  Av  Min: 102  Max: 125  BLOOD PRESSURE RANGE: Systolic (24hrs), Av , Min:117 , Max:165   ; Diastolic (24hrs), Av, Min:77, Max:97       Objective:  Vital signs: (most recent): Blood pressure (!) 165/95, pulse (!) 125, temperature 99.9 °F (37.7 °C), resp. rate 18, height 1.626 m (5' 4\"), weight 83.9 kg (185 lb), SpO2 100 %.        Vitals and nursing note reviewed.   Constitutional:       Appearance: He is ill-appearing.   Abdominal:      General: Bowel sounds are normal. There is no distension.   Genitourinary:     Comments: External urinary device with clear urine  Musculoskeletal:      Cervical back: Neck supple.      Right lower leg: Edema present.      Left lower leg: Edema present.        Legs:       Comments: Decreased right calf edema >Left calf edema with decreasing R>L erythema that is homogeneous, no open wounds or drainage   Skin:  No rash.   Neurological:      General: No focal deficit present.      Mental Status: He is alert and oriented to person, place, and time.   Psychiatric:         Mood and Affect: Mood normal.         Thought Content: Thought content normal.         Judgment: Judgment normal.      Labs/Imaging/Diagnostics

## 2024-02-05 NOTE — CONSULTS
The following medical history was reviewed    Past Medical History:   Past Medical History:   Diagnosis Date    Bilateral lower extremity edema     Gastrointestinal disorder     Hypertension       No past surgical history on file.    Social History:  Social History     Socioeconomic History    Marital status: Single     Spouse name: Not on file    Number of children: Not on file    Years of education: Not on file    Highest education level: Not on file   Occupational History    Not on file   Tobacco Use    Smoking status: Every Day     Current packs/day: 0.50     Types: Cigarettes    Smokeless tobacco: Never   Substance and Sexual Activity    Alcohol use: Yes     Comment: 1-2 drinks per day    Drug use: Never    Sexual activity: Not on file   Other Topics Concern    Not on file   Social History Narrative    Not on file     Social Determinants of Health     Financial Resource Strain: Not on file   Food Insecurity: No Food Insecurity (2/2/2024)    Hunger Vital Sign     Worried About Running Out of Food in the Last Year: Never true     Ran Out of Food in the Last Year: Never true   Transportation Needs: No Transportation Needs (2/2/2024)    PRAPARE - Transportation     Lack of Transportation (Medical): No     Lack of Transportation (Non-Medical): No   Physical Activity: Not on file   Stress: Not on file   Social Connections: Not on file   Intimate Partner Violence: Not on file   Housing Stability: Low Risk  (2/2/2024)    Housing Stability Vital Sign     Unable to Pay for Housing in the Last Year: No     Number of Places Lived in the Last Year: 1     Unstable Housing in the Last Year: No       Family History:   No family history on file.    Medications & Allergies     Allergies:   Allergies   Allergen Reactions    Other      Dove Feathers       Home Medications:  Prior to Admission medications    Medication Sig Start Date End Date Taking? Authorizing Provider   traMADol (ULTRAM) 50 MG tablet Take 1 tablet by mouth  Results   Component Value Date/Time     02/05/2024 05:34 AM    K 3.8 02/05/2024 05:34 AM     02/05/2024 05:34 AM    CO2 26 02/05/2024 05:34 AM    BUN 3 02/05/2024 05:34 AM    GFRAA >60 06/29/2022 11:24 AM      Last Lipid:  No results found for: \"CHOL\", \"CHOLX\", \"CHLST\", \"CHOLV\", \"HDL\", \"HDLC\", \"LDL\", \"LDLC\", \"TGLX\", \"TRIGL\"  No results found for: \"CPK\", \"RCK1\", \"CKMB\", \"BNP\"   Lab Results   Component Value Date/Time    TSH 3.39 06/28/2022 09:53 PM          Imaging & Acillary Studies     EKG:    Sinus tachycardia   Low voltage QRS   Cannot rule out Anterior infarct (cited on or before 01-FEB-2024)   Abnormal ECG   When compared with ECG of 20-DEC-2023 12:47,   No significant change was found   Confirmed by TAJ PERRY ([id number]) on 2/2/2024 10:57:17 AM     Vascular duplex lower extremity venous bilateral   Final Result      CTA CHEST W WO CONTRAST   Final Result   There is no pulmonary embolism.   There is no aortic aneurysm or dissection. Scattered groundglass opacities   predominantly in the upper lobes, may be related to pulmonary edema,   alternatively multifocal pneumonia.   Bibasilar atelectasis with small bilateral pleural effusions.      Incidental findings are as described above.               CT HEAD WO CONTRAST   Final Result   No acute intracranial process.      Imaging findings consistent with mild chronic microvascular ischemic change.   There is a mild degree of cerebral atrophy.             CT TIBIA FIBULA RIGHT WO CONTRAST   Final Result   Diffuse soft tissue edema and swelling, compatible with cellulitis. No definite   fluid collection to suggest abscess. No gas to suggest necrotizing fasciitis.       CT TIBIA FIBULA LEFT WO CONTRAST   Final Result   1. Diffuse subcutaneous edema compatible with the clinical diagnosis of   cellulitis. No abscess. No drainable fluid collection      XR CHEST PORTABLE   Final Result   1. No acute disease              Cardiac cath:    No results found for

## 2024-02-05 NOTE — PROGRESS NOTES
Vancomycin Dosing Consult  Checo Dey is a 58 y.o. male with SSTI (cellulitis). Pharmacy was consulted by Dr. Redd to dose and monitor vancomycin. Today is day 1.    Antibiotic regimen: Vancomycin monotherapy    Temp (24hrs), Av.5 °F (36.9 °C), Min:97.5 °F (36.4 °C), Max:99.9 °F (37.7 °C)    Recent Labs     24  0240 24  0707 24  0534   WBC 8.7 7.1 8.9   CREATININE 0.69* 0.48*  0.46* 0.32*   BUN 4* 3*  3* 3*     Est CrCl: 111 mL/min  Concomitant nephrotoxic drugs: NSAIDs and Contrast agents    Cultures:    Blood: NGTD x 3 days    MRSA Swab: Not ordered, patient already received first dose of vancomycin    Target range: AUC/WENDY 400-600    Recent level history:  Date/Time Dose & Interval Measured Level (mcg/mL) Associated AUC/WENDY   2/5 @ 0534 2000mg x 1, then 1250mg q12h 17.5 452        Assessment/Plan:   Pt with hx of venous stasis dermatitis with b/l cellulitis. Vitals otherwise stable except for tachycardic this AM. Pharmacy consulted to dose vancomycin  Renal function normal and stable; continue AUC guided dosing  Level resulted at 17.5 (true trough ~12) with associated AUC of 481 once at steady state  Continue vancomycin 1250mg q12h  Next level scheduled for  @ 0600 (but may not need since plan to transition to PO regimen)  CMP already ordered through   Antimicrobial stop date: 7 days (per ID)

## 2024-02-05 NOTE — PROGRESS NOTES
Hospitalist Progress Note    NAME:   Checo Dey   : 1965   MRN: [id number]     Date/Time: 2024 3:04 PM  Patient PCP: None, None    Estimated discharge date: 24 hours  Barriers: Echocardiogram, insurance auth IRF      HOSPITAL COURSE:    Checo Dey is a 58-year-old male with a PMH of bilateral lower extremity swelling, alcohol use disorder, neuropathy,  and recurrent cellulitis who was admitted on  for cellulitis unresponsive to PO antibiotics in the outpatient setting. Associated bilateral leg erythema and tenderness. Reported numbness at baseline. Patient also reports episodes of syncope where he wakes up and does not remember \"how he got to where he is.\" Tachycardic, tachypneic and hypotensive overnight at 81/50 on . LA 2.58. Started on vancomycin for empiric coverage. ID consulted. CXR no acute processes on . Duplex US bilateral lower ext negative for DVT. Legs have improved. Sepsis resolved, likely secondary to bilateral lower extremity cellulitis. Due to syncope workup to include echocardiogram and cardiac consultation. CTA of the chest revealed no obvious pulmonary emboli although there is patchy infiltrates consistent with atypical pneumonia. Started on Zithromax for atypical pneumonia coverage. COVID and influenza negative. On room air.     Assessment / Plan:    Severe Sepsis  - Source: cellulitis  - Tachycardic, tachypneic and hypotensive overnight at 81/50 on   - UA no UTI  - CXR no acute processes  - Lactic acidosis resolved  - Blood cultures no growth x 2 days  - Continue IVFs  - Continue vancomycin and Zithromax     2. Bilateral lower extremity swelling  - Bilateral lymphedema with venous stasis dermatitis and/or cellulitis  - CT LE no evidence of gas or fluid collection bilaterally  - Discontinue Zosyn per infectious disease  - Continue vancomycin   - Continue lasix  - Consult wound care  - ID following     3. Syncope  - Orthostatic hypotension most likely the    ________________________________________________________________________  Total NON critical care TIME:  35  Minutes    Total CRITICAL CARE TIME Spent:   Minutes non procedure based      Comments   >50% of visit spent in counseling and coordination of care     ________________________________________________________________________  Hai Pop PA-C     Procedures: see electronic medical records for all procedures/Xrays and details which were not copied into this note but were reviewed prior to creation of Plan.      LABS:  I reviewed today's most current labs and imaging studies.  Pertinent labs include:  Recent Labs     02/03/24 0240 02/04/24 0707 02/05/24  0534   WBC 8.7 7.1 8.9   HGB 9.0* 8.9* 8.7*   HCT 26.5* 26.0* 26.2*    356 417*     Recent Labs     02/03/24 0240 02/04/24 0707 02/05/24  0534    139  139 140   K 3.5 3.7  3.7 3.8   * 109*  108 110*   CO2 26 26  27 26   BUN 4* 3*  3* 3*   ALT 12 9* 8*       Signed: Hai Pop PA-C

## 2024-02-05 NOTE — PLAN OF CARE
OCCUPATIONAL THERAPY EVALUATION  Patient: Checo Dey (58 y.o. male)  Date: 2/5/2024  Primary Diagnosis: Sepsis (HCC) [A41.9]  Severe sepsis (HCC) [A41.9, R65.20]  Cellulitis of lower extremity, unspecified laterality [L03.119]       Precautions: Fall Risk, Bed Alarm                Recommendations for nursing mobility: Encourage HEP in prep for ADLs/mobility; see handout for details, Frequent repositioning to prevent skin breakdown, Use of bed/chair alarm for safety, LE elevation for management of edema, and Assist x1    In place during session:Peripheral IV, External Catheter, and EKG/telemetry   ASSESSMENT  Pt is a 58 y.o. male presenting to [facility] with c/o cellulitis, admitted 2/1/24 and currently being treated for severe sepsis, BLE swelling, syncope, neuropathy, hypokalemia, macrocytic anemia, visual hallucinations, alcohol use disorder, and multifocal PNA. Pt received semi-supine in bed with RN at bedside upon arrival, AXO x4, and agreeable to OT evaluation.     Based on current observations, pt presents with decreased  functional mobility, independence in ADLs, high-level IADLs, ROM, strength, body mechanics, activity tolerance, safety awareness, cognition, command following, attention/concentration, balance, posture (see below for objective details and assist levels).     Overall, pt tolerates session fair with no c/o pain. Bed mobility and OOB functional transfers/mobility completed with min-mod A and additional time overall. Pt able to take a few steps toward HOB with min-mod A and then return seated EOB. Pt declined LB dressing, however anticipate total A being req'd d/t reports of not typically wearing socks and that this task would be difficult to complete. VC req'd throughout session to maintain attention to tasks. Pt hallucinating and perseverated on hallucinations req'ing constant redirection throughout session. Further OOB functional mobility deferred for safety d/t pt's overall confusion this  1 [x]  2 []  3 []  4   3.  Toileting, which includes using toilet, bedpan or urinal? [x] 1 []  2 []  3 []  4   4.  Putting on and taking off regular upper body clothing? []  1 []  2 [x]  3 []  4   5.  Taking care of personal grooming such as brushing teeth? []  1 []  2 [x]  3 []  4   6.  Eating meals? []  1 []  2 [x]  3 []  4   © , Trustees of [facility], under license to [employer]. All rights reserved     Score:      Interpretation of Tool:  Represents clinically-significant functional categories (i.e. Activities of daily living).  Percentage of Impairment CH    0%   CI    1-19% CJ    20-39% CK    40-59% CL    60-79% CM    80-99% CN     100%   [facility]  Score 6-24 24 23 20-22 15-19 10-14 7-9 6     Occupational Therapy Evaluation Charge Determination   History Examination Decision-Making   LOW Complexity : Brief history review  MEDIUM Complexity: 3-5 Performance deficits relating to physical, cognitive, or psychosocial skills that result in activity limitations and/or participation restrictions MEDIUM Complexity: Patient may present with comorbidities that affect occupational performance. Minimal to moderate modifications of tasks or assist (eg. physical or verbal) with assist is necessary to enable pt to complete eval      Based on the above components, the patient evaluation is determined to be of the following complexity level: Low    Pain Ratin/10   Pain Intervention(s):       Activity Tolerance:   Fair  and requires rest breaks    After treatment patient left in no apparent distress:    Patient left in no apparent distress in bed, Call bell within reach, Bed/ chair alarm activated, and Side rails x3, bed locked and in lowest position    COMMUNICATION/EDUCATION:   The patient’s plan of care was discussed with: Registered nurse    Patient Education  Education Given To: Patient  Education Provided: Role of Therapy;Fall Prevention Strategies;Plan of Care;ADL Adaptive

## 2024-02-06 LAB
ALBUMIN SERPL-MCNC: 2 G/DL (ref 3.5–5)
ALBUMIN/GLOB SERPL: 0.5 (ref 1.1–2.2)
ALP SERPL-CCNC: 153 U/L (ref 45–117)
ALT SERPL-CCNC: 9 U/L (ref 12–78)
ANION GAP SERPL CALC-SCNC: 7 MMOL/L (ref 5–15)
ASO AB SERPL-ACNC: 36 IU/ML (ref 0–200)
AST SERPL W P-5'-P-CCNC: 35 U/L (ref 15–37)
BASOPHILS # BLD: 0 K/UL (ref 0–0.1)
BASOPHILS NFR BLD: 0 % (ref 0–1)
BILIRUB SERPL-MCNC: 0.7 MG/DL (ref 0.2–1)
BUN SERPL-MCNC: 3 MG/DL (ref 6–20)
BUN/CREAT SERPL: 8 (ref 12–20)
CA-I BLD-MCNC: 8.6 MG/DL (ref 8.5–10.1)
CHLORIDE SERPL-SCNC: 110 MMOL/L (ref 97–108)
CO2 SERPL-SCNC: 26 MMOL/L (ref 21–32)
CREAT SERPL-MCNC: 0.4 MG/DL (ref 0.7–1.3)
CRP SERPL-MCNC: 4.93 MG/DL (ref 0–0.3)
DIFFERENTIAL METHOD BLD: ABNORMAL
ECHO AO ASC DIAM: 3.4 CM
ECHO AO ASCENDING AORTA INDEX: 1.8 CM/M2
ECHO AO ROOT DIAM: 2.7 CM
ECHO AO ROOT INDEX: 1.43 CM/M2
ECHO AV AREA PEAK VELOCITY: 2.6 CM2
ECHO AV AREA VTI: 2.8 CM2
ECHO AV AREA/BSA PEAK VELOCITY: 1.4 CM2/M2
ECHO AV AREA/BSA VTI: 1.5 CM2/M2
ECHO AV CUSP MM: 1.5 CM
ECHO AV MEAN GRADIENT: 3 MMHG
ECHO AV MEAN VELOCITY: 0.9 M/S
ECHO AV PEAK GRADIENT: 6 MMHG
ECHO AV PEAK VELOCITY: 1.2 M/S
ECHO AV VELOCITY RATIO: 0.83
ECHO AV VTI: 18.3 CM
ECHO BSA: 1.95 M2
ECHO LA AREA 2C: 16.1 CM2
ECHO LA AREA 4C: 14.5 CM2
ECHO LA DIAMETER INDEX: 1.69 CM/M2
ECHO LA DIAMETER: 3.2 CM
ECHO LA MAJOR AXIS: 4.7 CM
ECHO LA MINOR AXIS: 5.2 CM
ECHO LA TO AORTIC ROOT RATIO: 1.19
ECHO LA VOL BP: 40 ML (ref 18–58)
ECHO LA VOL MOD A2C: 41 ML (ref 18–58)
ECHO LA VOL MOD A4C: 37 ML (ref 18–58)
ECHO LA VOL/BSA BIPLANE: 21 ML/M2 (ref 16–34)
ECHO LA VOLUME INDEX MOD A2C: 22 ML/M2 (ref 16–34)
ECHO LA VOLUME INDEX MOD A4C: 20 ML/M2 (ref 16–34)
ECHO LV E' LATERAL VELOCITY: 15 CM/S
ECHO LV E' SEPTAL VELOCITY: 12 CM/S
ECHO LV EDV A2C: 93 ML
ECHO LV EDV A4C: 97 ML
ECHO LV EDV INDEX A4C: 51 ML/M2
ECHO LV EDV NDEX A2C: 49 ML/M2
ECHO LV EJECTION FRACTION A2C: 70 %
ECHO LV EJECTION FRACTION A4C: 61 %
ECHO LV EJECTION FRACTION BIPLANE: 68 % (ref 55–100)
ECHO LV ESV A2C: 28 ML
ECHO LV ESV A4C: 37 ML
ECHO LV ESV INDEX A2C: 15 ML/M2
ECHO LV ESV INDEX A4C: 20 ML/M2
ECHO LV FRACTIONAL SHORTENING: 30 % (ref 28–44)
ECHO LV INTERNAL DIMENSION DIASTOLE INDEX: 2.65 CM/M2
ECHO LV INTERNAL DIMENSION DIASTOLIC MMODE: 5.1 CM (ref 4.2–5.9)
ECHO LV INTERNAL DIMENSION DIASTOLIC: 5 CM (ref 4.2–5.9)
ECHO LV INTERNAL DIMENSION SYSTOLIC INDEX: 1.85 CM/M2
ECHO LV INTERNAL DIMENSION SYSTOLIC MMODE: 3.6 CM
ECHO LV INTERNAL DIMENSION SYSTOLIC: 3.5 CM
ECHO LV IVSD MMODE: 0.8 CM (ref 0.6–1)
ECHO LV IVSD: 1.3 CM (ref 0.6–1)
ECHO LV MASS 2D: 233.7 G (ref 88–224)
ECHO LV MASS INDEX 2D: 123.7 G/M2 (ref 49–115)
ECHO LV POSTERIOR WALL DIASTOLIC MMODE: 0.7 CM (ref 0.6–1)
ECHO LV POSTERIOR WALL DIASTOLIC: 1.1 CM (ref 0.6–1)
ECHO LV RELATIVE WALL THICKNESS RATIO: 0.44
ECHO LVOT AREA: 3.1 CM2
ECHO LVOT AV VTI INDEX: 0.89
ECHO LVOT DIAM: 2 CM
ECHO LVOT MEAN GRADIENT: 2 MMHG
ECHO LVOT PEAK GRADIENT: 4 MMHG
ECHO LVOT PEAK VELOCITY: 1 M/S
ECHO LVOT STROKE VOLUME INDEX: 26.9 ML/M2
ECHO LVOT SV: 50.9 ML
ECHO LVOT VTI: 16.2 CM
ECHO MV A VELOCITY: 0.94 M/S
ECHO MV E DECELERATION TIME (DT): 185 MS
ECHO MV E VELOCITY: 1.14 M/S
ECHO MV E/A RATIO: 1.21
ECHO MV E/E' LATERAL: 7.6
ECHO MV E/E' RATIO (AVERAGED): 8.55
ECHO PV MAX VELOCITY: 1.1 M/S
ECHO PV PEAK GRADIENT: 5 MMHG
ECHO RA AREA 4C: 10 CM2
ECHO RA END SYSTOLIC VOLUME APICAL 4 CHAMBER INDEX BSA: 11 ML/M2
ECHO RA VOLUME: 21 ML
ECHO RV BASAL DIMENSION: 3.3 CM
ECHO RV LONGITUDINAL DIMENSION: 7.5 CM
ECHO RV MID DIMENSION: 3.1 CM
ECHO RV TAPSE: 2.2 CM (ref 1.7–?)
ECHO RVOT MEAN GRADIENT: 1 MMHG
ECHO RVOT PEAK GRADIENT: 2 MMHG
ECHO RVOT PEAK VELOCITY: 0.7 M/S
ECHO RVOT VTI: 14.4 CM
EOSINOPHIL # BLD: 0 K/UL (ref 0–0.4)
EOSINOPHIL NFR BLD: 0 % (ref 0–7)
ERYTHROCYTE [DISTWIDTH] IN BLOOD BY AUTOMATED COUNT: 17.7 % (ref 11.5–14.5)
GLOBULIN SER CALC-MCNC: 3.7 G/DL (ref 2–4)
GLUCOSE SERPL-MCNC: 76 MG/DL (ref 65–100)
HCT VFR BLD AUTO: 31.9 % (ref 36.6–50.3)
HGB BLD-MCNC: 11 G/DL (ref 12.1–17)
IMM GRANULOCYTES # BLD AUTO: 0.1 K/UL (ref 0–0.04)
IMM GRANULOCYTES NFR BLD AUTO: 1 % (ref 0–0.5)
LYMPHOCYTES # BLD: 2.4 K/UL (ref 0.8–3.5)
LYMPHOCYTES NFR BLD: 25 % (ref 12–49)
MCH RBC QN AUTO: 34.4 PG (ref 26–34)
MCHC RBC AUTO-ENTMCNC: 34.5 G/DL (ref 30–36.5)
MCV RBC AUTO: 99.7 FL (ref 80–99)
MONOCYTES # BLD: 1.2 K/UL (ref 0–1)
MONOCYTES NFR BLD: 13 % (ref 5–13)
NEUTS SEG # BLD: 5.7 K/UL (ref 1.8–8)
NEUTS SEG NFR BLD: 61 % (ref 32–75)
NRBC # BLD: 0 K/UL (ref 0–0.01)
NRBC BLD-RTO: 0 PER 100 WBC
PLATELET # BLD AUTO: 533 K/UL (ref 150–400)
PMV BLD AUTO: 9.1 FL (ref 8.9–12.9)
POTASSIUM SERPL-SCNC: 3.4 MMOL/L (ref 3.5–5.1)
PROCALCITONIN SERPL-MCNC: 0.2 NG/ML
PROT SERPL-MCNC: 5.7 G/DL (ref 6.4–8.2)
RBC # BLD AUTO: 3.2 M/UL (ref 4.1–5.7)
SODIUM SERPL-SCNC: 143 MMOL/L (ref 136–145)
STREP DNASE B SER-ACNC: 101 U/ML (ref 0–120)
WBC # BLD AUTO: 9.4 K/UL (ref 4.1–11.1)

## 2024-02-06 PROCEDURE — 6370000000 HC RX 637 (ALT 250 FOR IP): Performed by: STUDENT IN AN ORGANIZED HEALTH CARE EDUCATION/TRAINING PROGRAM

## 2024-02-06 PROCEDURE — 2580000003 HC RX 258: Performed by: PHYSICIAN ASSISTANT

## 2024-02-06 PROCEDURE — 6370000000 HC RX 637 (ALT 250 FOR IP): Performed by: INTERNAL MEDICINE

## 2024-02-06 PROCEDURE — 84145 PROCALCITONIN (PCT): CPT

## 2024-02-06 PROCEDURE — 6370000000 HC RX 637 (ALT 250 FOR IP): Performed by: PHYSICIAN ASSISTANT

## 2024-02-06 PROCEDURE — 6360000002 HC RX W HCPCS: Performed by: INTERNAL MEDICINE

## 2024-02-06 PROCEDURE — P9047 ALBUMIN (HUMAN), 25%, 50ML: HCPCS | Performed by: STUDENT IN AN ORGANIZED HEALTH CARE EDUCATION/TRAINING PROGRAM

## 2024-02-06 PROCEDURE — 2580000003 HC RX 258: Performed by: INTERNAL MEDICINE

## 2024-02-06 PROCEDURE — 85025 COMPLETE CBC W/AUTO DIFF WBC: CPT

## 2024-02-06 PROCEDURE — 80053 COMPREHEN METABOLIC PANEL: CPT

## 2024-02-06 PROCEDURE — 6370000000 HC RX 637 (ALT 250 FOR IP): Performed by: PSYCHIATRY & NEUROLOGY

## 2024-02-06 PROCEDURE — 86140 C-REACTIVE PROTEIN: CPT

## 2024-02-06 PROCEDURE — 97530 THERAPEUTIC ACTIVITIES: CPT

## 2024-02-06 PROCEDURE — 36415 COLL VENOUS BLD VENIPUNCTURE: CPT

## 2024-02-06 PROCEDURE — 6360000002 HC RX W HCPCS: Performed by: STUDENT IN AN ORGANIZED HEALTH CARE EDUCATION/TRAINING PROGRAM

## 2024-02-06 PROCEDURE — 99232 SBSQ HOSP IP/OBS MODERATE 35: CPT | Performed by: INTERNAL MEDICINE

## 2024-02-06 PROCEDURE — 6360000002 HC RX W HCPCS: Performed by: PHYSICIAN ASSISTANT

## 2024-02-06 PROCEDURE — 2060000000 HC ICU INTERMEDIATE R&B

## 2024-02-06 PROCEDURE — 2500000003 HC RX 250 WO HCPCS: Performed by: STUDENT IN AN ORGANIZED HEALTH CARE EDUCATION/TRAINING PROGRAM

## 2024-02-06 RX ORDER — ALBUMIN (HUMAN) 12.5 G/50ML
25 SOLUTION INTRAVENOUS ONCE
Status: COMPLETED | OUTPATIENT
Start: 2024-02-06 | End: 2024-02-06

## 2024-02-06 RX ORDER — FUROSEMIDE 10 MG/ML
20 INJECTION INTRAMUSCULAR; INTRAVENOUS ONCE
Status: COMPLETED | OUTPATIENT
Start: 2024-02-06 | End: 2024-02-06

## 2024-02-06 RX ORDER — METOPROLOL TARTRATE 1 MG/ML
5 INJECTION, SOLUTION INTRAVENOUS EVERY 6 HOURS PRN
Status: DISCONTINUED | OUTPATIENT
Start: 2024-02-06 | End: 2024-02-09 | Stop reason: HOSPADM

## 2024-02-06 RX ORDER — POTASSIUM CHLORIDE 20 MEQ/1
40 TABLET, EXTENDED RELEASE ORAL ONCE
Status: COMPLETED | OUTPATIENT
Start: 2024-02-06 | End: 2024-02-06

## 2024-02-06 RX ADMIN — FUROSEMIDE 20 MG: 10 INJECTION, SOLUTION INTRAMUSCULAR; INTRAVENOUS at 10:36

## 2024-02-06 RX ADMIN — LORAZEPAM 2 MG: 2 INJECTION INTRAMUSCULAR; INTRAVENOUS at 23:53

## 2024-02-06 RX ADMIN — LORAZEPAM 2 MG: 1 TABLET ORAL at 17:22

## 2024-02-06 RX ADMIN — VANCOMYCIN HYDROCHLORIDE 1250 MG: 1.25 INJECTION, POWDER, LYOPHILIZED, FOR SOLUTION INTRAVENOUS at 08:31

## 2024-02-06 RX ADMIN — SODIUM CHLORIDE, PRESERVATIVE FREE 10 ML: 5 INJECTION INTRAVENOUS at 21:55

## 2024-02-06 RX ADMIN — GABAPENTIN 600 MG: 300 CAPSULE ORAL at 14:13

## 2024-02-06 RX ADMIN — SODIUM CHLORIDE, PRESERVATIVE FREE 10 ML: 5 INJECTION INTRAVENOUS at 21:54

## 2024-02-06 RX ADMIN — FOLIC ACID 1 MG: 1 TABLET ORAL at 08:31

## 2024-02-06 RX ADMIN — POTASSIUM BICARBONATE 20 MEQ: 782 TABLET, EFFERVESCENT ORAL at 10:36

## 2024-02-06 RX ADMIN — GABAPENTIN 600 MG: 300 CAPSULE ORAL at 08:31

## 2024-02-06 RX ADMIN — POTASSIUM CHLORIDE 40 MEQ: 1500 TABLET, EXTENDED RELEASE ORAL at 12:28

## 2024-02-06 RX ADMIN — ALBUMIN (HUMAN) 25 G: 0.25 INJECTION, SOLUTION INTRAVENOUS at 12:30

## 2024-02-06 RX ADMIN — SODIUM CHLORIDE, PRESERVATIVE FREE 10 ML: 5 INJECTION INTRAVENOUS at 08:32

## 2024-02-06 RX ADMIN — PANTOPRAZOLE SODIUM 40 MG: 40 TABLET, DELAYED RELEASE ORAL at 08:31

## 2024-02-06 RX ADMIN — Medication 400 MG: at 08:32

## 2024-02-06 RX ADMIN — GABAPENTIN 600 MG: 300 CAPSULE ORAL at 21:39

## 2024-02-06 RX ADMIN — METOPROLOL TARTRATE 5 MG: 5 INJECTION INTRAVENOUS at 23:51

## 2024-02-06 RX ADMIN — OLANZAPINE 5 MG: 5 TABLET, ORALLY DISINTEGRATING ORAL at 17:23

## 2024-02-06 RX ADMIN — LORAZEPAM 2 MG: 2 INJECTION INTRAMUSCULAR; INTRAVENOUS at 15:56

## 2024-02-06 RX ADMIN — THIAMINE HCL TAB 100 MG 100 MG: 100 TAB at 08:31

## 2024-02-06 RX ADMIN — AZITHROMYCIN MONOHYDRATE 500 MG: 500 INJECTION, POWDER, LYOPHILIZED, FOR SOLUTION INTRAVENOUS at 14:13

## 2024-02-06 RX ADMIN — LORAZEPAM 2 MG: 2 INJECTION INTRAMUSCULAR; INTRAVENOUS at 08:32

## 2024-02-06 RX ADMIN — OLANZAPINE 5 MG: 5 TABLET, ORALLY DISINTEGRATING ORAL at 08:31

## 2024-02-06 RX ADMIN — LORAZEPAM 1 MG: 1 TABLET ORAL at 02:21

## 2024-02-06 RX ADMIN — POTASSIUM CHLORIDE 20 MEQ: 1500 TABLET, EXTENDED RELEASE ORAL at 08:31

## 2024-02-06 RX ADMIN — LORAZEPAM 2 MG: 2 INJECTION INTRAMUSCULAR; INTRAVENOUS at 14:13

## 2024-02-06 RX ADMIN — ENOXAPARIN SODIUM 40 MG: 100 INJECTION SUBCUTANEOUS at 08:31

## 2024-02-06 RX ADMIN — METOPROLOL TARTRATE 25 MG: 25 TABLET, FILM COATED ORAL at 10:36

## 2024-02-06 RX ADMIN — VANCOMYCIN HYDROCHLORIDE 1250 MG: 1.25 INJECTION, POWDER, LYOPHILIZED, FOR SOLUTION INTRAVENOUS at 21:52

## 2024-02-06 NOTE — DISCHARGE SUMMARY
Discharge Summary    Name: Checo Dey  [id number]  YOB: 1965 (Age: 58 y.o.)   Date of Admission: 2/1/2024  Date of Discharge: 2/6/2024  Attending Physician: Rico Skinner MD    Discharge Diagnosis:   Principal Problem:    Sepsis (HCC)  Active Problems:    Tachycardia    Cellulitis of lower extremity    Alcohol abuse    Multifocal pneumonia  Resolved Problems:    * No resolved hospital problems. *       Consultations:  IP CONSULT TO PSYCHIATRY  IP WOUND CARE NURSE CONSULT TO EVAL  IP CONSULT TO INFECTIOUS DISEASES  IP CONSULT TO SOCIAL WORK  IP CONSULT TO PHARMACY  IP CONSULT TO CARDIOLOGY      Brief Admission History/Reason for Admission Per Rocco Hammonds Cha, MD:   Recurrent cellulitis     Brief Hospital Course by Main Problems:   Checo Dey is a 58-year-old male with a PMH of bilateral lower extremity swelling, alcohol use disorder, neuropathy, and recurrent cellulitis who was admitted on 02/01 for cellulitis unresponsive to PO antibiotics in the outpatient setting. Associated bilateral leg erythema and tenderness. Reported numbness at baseline. Patient also reports episodes of syncope where he wakes up and does not remember \"how he got to where he is.\" Tachycardic, tachypneic and hypotensive overnight at 81/50 on 02/01/24. LA 2.58. Started on vancomycin for empiric coverage. ID consulted. CXR no acute processes on 02/01. Duplex US bilateral lower ext negative for DVT. Legs have improved. Sepsis resolved, likely secondary to bilateral lower extremity cellulitis. Persistent tachycardia. Cardiac consultation. CTA of the chest revealed no obvious pulmonary emboli although there is patchy infiltrates consistent with atypical pneumonia. Started on Zithromax for atypical pneumonia coverage. COVID and influenza negative. On room air. Tachycardia likely related to alcohol withdrawal and infectious process. Echocardiogram LVEF 55-60%. Start on oral doxycyline x 3  in 1 week(s) Establish  Fara Swenson  [facility] [address]  [phone]      Getachew Jerez MD Cardiology Schedule an appointment as soon as possible for a visit in 1 week(s) Outpatient follow-up tachycardia [address]  [facility] [address]  [phone]                  Total time in minutes spent coordinating this discharge (includes going over instructions, follow-up, prescriptions, and preparing report for sign off to her PCP) :  35 minutes

## 2024-02-06 NOTE — PROGRESS NOTES
Progress Note  Date:2024       [place] [place] [facility] [place]  Patient Name:Checo Dey     YOB: 1965     Age:58 y.o.        Subjective    Subjective  Patient followed for bilateral lower extremity cellulitis.  No wound cultures available but blood cultures negative so far.  He remains on IV Vancomycin.  Patient is somnolent and has mitts on today. CRP and procal decreasing. CT Chest showed groundglass changes in lung, however, SARS CoV-2 negative. No new complaints.      Vitals Last 24 Hours:  TEMPERATURE:  Temp  Av.2 °F (36.8 °C)  Min: 97.3 °F (36.3 °C)  Max: 98.7 °F (37.1 °C)  RESPIRATIONS RANGE: Resp  Av.5  Min: 18  Max: 21  PULSE OXIMETRY RANGE: SpO2  Av.4 %  Min: 98 %  Max: 100 %  PULSE RANGE: Pulse  Av.5  Min: 94  Max: 123  BLOOD PRESSURE RANGE: Systolic (24hrs), Av , Min:126 , Max:182   ; Diastolic (24hrs), Av, Min:74, Max:111       Objective:  Vital signs: (most recent): Blood pressure 129/86, pulse 94, temperature 97.5 °F (36.4 °C), resp. rate 18, height 1.626 m (5' 4\"), weight 84.4 kg (186 lb 1.1 oz), SpO2 100 %.        Vitals and nursing note reviewed.   Constitutional:       Appearance: He is ill-appearing.   Abdominal:      General: Bowel sounds are normal. There is no distension.   Genitourinary:     Comments: External urinary device with clear urine  Musculoskeletal:      Cervical back: Neck supple.      Right lower leg: Edema present.      Left lower leg: Edema present.        Legs:       Comments: Decreased right calf edema >Left calf edema with decreasing R>L erythema that is homogeneous, no open wounds or drainage   Skin:  No rash.   Neurological:      General: No focal deficit present.      Mental Status: He is alert and oriented to person, place, and time.   Psychiatric:         Mood and Affect: Mood normal.         Thought Content: Thought content normal.         Judgment: Judgment normal.      Labs/Imaging/Diagnostics    Labs:  CBC:  Recent Labs      02/04/24  0707 02/05/24  0534 02/06/24 0324   WBC 7.1 8.9 9.4   RBC 2.60* 2.58* 3.20*   HGB 8.9* 8.7* 11.0*   HCT 26.0* 26.2* 31.9*   .0* 101.6* 99.7*   RDW 17.3* 17.4* 17.7*    417* 533*       CHEMISTRIES:  Recent Labs     02/04/24  0707 02/05/24  0534 02/06/24 0324     139 140 143   K 3.7  3.7 3.8 3.4*   *  108 110* 110*   CO2 26  27 26 26   BUN 3*  3* 3* 3*   CREATININE 0.48*  0.46* 0.32* 0.40*   GLUCOSE 76  78 76 76          Lactic acid 2.58    Procal 0.20 <0.28 <0.25 <0.24  CRP  4.93 <3.65 <5.65      Blood cultures (2/1) No growth 5 days  Blood cultures (2/1) No growth 5 days    Imaging Last 24 Hours:  CT HEAD WO CONTRAST    Result Date: 2/3/2024       No acute intracranial process. Imaging findings consistent with mild chronic microvascular ischemic change. There is a mild degree of cerebral atrophy.      CTA CHEST W WO CONTRAST    Result Date: 2/3/2024       There is no pulmonary embolism. There is no aortic aneurysm or dissection. Scattered groundglass opacities predominantly in the upper lobes, may be related to pulmonary edema, alternatively multifocal pneumonia. Bibasilar atelectasis with small bilateral pleural effusions. Incidental findings are as described above.          CT TIBIA FIBULA RIGHT WO CONTRAST     Result Date: 2/2/2024  Diffuse soft tissue edema and swelling, compatible with cellulitis. No definite fluid collection to suggest abscess. No gas to suggest necrotizing fasciitis.     CT TIBIA FIBULA LEFT WO CONTRAST     Result Date: 2/2/2024  1. Diffuse subcutaneous edema compatible with the clinical diagnosis of cellulitis. No abscess. No drainable fluid collection         Assessment//Plan           Hospital Problems             Last Modified POA    * (Principal) Sepsis (HCC) 2/1/2024 Yes    Tachycardia 2/6/2024 Yes    Cellulitis of lower extremity 2/5/2024 Yes    Alcohol abuse 2/6/2024 Yes    Multifocal pneumonia 2/5/2024 Yes      Bilateral lymphedema with

## 2024-02-06 NOTE — PROGRESS NOTES
Discharge paperwork complete except for next dose due. Print and send with patient when they leave. Primary nurse aware.

## 2024-02-06 NOTE — PROGRESS NOTES
2/6/2024 11:27 AM  NAME: Checo Dey   MRN:  [id number]   Admit Diagnosis: Sepsis (HCC) [A41.9]  Severe sepsis (HCC) [A41.9, R65.20]  Cellulitis of lower extremity, unspecified laterality [L03.119]          Assessment/Plan:   Sinus tachycardia, improved heart rate  in the low 100s.    Leg edema, mild.  2/24 echo with ejection fraction of 55-60    Cellulitis lower extremity    Alcohol abuse    Hypokalemia, will supplement.         []       High complexity decision making was performed in this patient at high risk for decompensation with multiple organ involvement.    Subjective:     Checo Dey denies chest pain, dyspnea.  Discussed with RN events overnight.     Review of Systems:    Symptom Y/N Comments  Symptom Y/N Comments   Fever/Chills N   Chest Pain N    Poor Appetite N   Edema N    Cough N   Abdominal Pain N    Sputum N   Joint Pain N    SOB/HERNANDEZ N   Pruritis/Rash N    Nausea/vomit N   Tolerating PT/OT Y    Diarrhea N   Tolerating Diet Y    Constipation N   Other       Could NOT obtain due to:      Objective:      Physical Exam:    Last 24hrs VS reviewed since prior progress note. Most recent are:    BP (!) 169/111   Pulse (!) 123   Temp 98.3 °F (36.8 °C) (Oral)   Resp 18   Ht 1.626 m (5' 4\")   Wt 84.4 kg (186 lb 1.1 oz)   SpO2 98%   BMI 31.94 kg/m²     Intake/Output Summary (Last 24 hours) at 2/6/2024 1127  Last data filed at 2/6/2024 1000  Gross per 24 hour   Intake 500 ml   Output 3450 ml   Net -2950 ml        General Appearance: Middle-age male, no distress  Ears/Nose/Mouth/Throat: Hearing grossly normal; moist mucous membranes  Neck: Supple.  Chest: Lungs clear to auscultation bilaterally.  Cardiovascular: Regular  rhythm, rapid rate, S1S2 normal, no murmur.  Abdomen: Soft, non-tender, bowel sounds are active.  Extremities: Minimal edema bilaterally.  Skin: Warm and dry.    []         Post-cath site without hematoma, bruit, tenderness, or thrill.  Distal pulses intact.    PMH/SH  reviewed - no change compared to H&P    Data Review    Telemetry:     EKG:   []  No new EKG for review    Lab Data Personally Reviewed:    Recent Labs     02/05/24  0534 02/06/24  0324   WBC 8.9 9.4   HGB 8.7* 11.0*   HCT 26.2* 31.9*   * 533*     No results for input(s): \"INR\", \"APTT\" in the last 72 hours.    Invalid input(s): \"PTP\"   Recent Labs     02/04/24  0707 02/05/24  0534 02/06/24  0324     139 140 143   K 3.7  3.7 3.8 3.4*   *  108 110* 110*   CO2 26  27 26 26   BUN 3*  3* 3* 3*     No results for input(s): \"CPK\" in the last 72 hours.    Invalid input(s): \"CPKMB\", \"CKNDX\", \"TROIQ\"  No results found for: \"CHOL\", \"CHOLX\", \"CHLST\", \"CHOLV\", \"HDL\", \"HDLC\", \"LDL\", \"LDLC\", \"TGLX\", \"TRIGL\"    Recent Labs     02/04/24  0707 02/05/24  0534 02/06/24  0324   GLOB 2.9 3.0 3.7     No results for input(s): \"PH\", \"PCO2\", \"PO2\" in the last 72 hours.    Medications Personally Reviewed:    Current Facility-Administered Medications   Medication Dose Route Frequency    albumin human 25% IV solution 25 g  25 g IntraVENous Once    metoprolol tartrate (LOPRESSOR) tablet 25 mg  25 mg Oral Daily    metoprolol (LOPRESSOR) injection 5 mg  5 mg IntraVENous Q6H PRN    [START ON 2/8/2024] Vancomycin Trough: Please draw level on 2/6 at 0600  1 each Other Once    Vancomycin Pharmacy Dosing   Other RX Placeholder    vancomycin (VANCOCIN) 1,250 mg in sodium chloride 0.9 % 250 mL IVPB ([id number])  1,250 mg IntraVENous Q12H    azithromycin (ZITHROMAX) 500 mg in sodium chloride 0.9 % 250 mL IVPB ([id number])  500 mg IntraVENous Q24H    sodium chloride flush 0.9 % injection 5-40 mL  5-40 mL IntraVENous 2 times per day    sodium chloride flush 0.9 % injection 5-40 mL  5-40 mL IntraVENous PRN    0.9 % sodium chloride infusion   IntraVENous PRN    LORazepam (ATIVAN) tablet 1 mg  1 mg Oral Q1H PRN    Or    LORazepam (ATIVAN) injection 1 mg  1 mg IntraVENous Q1H PRN    Or    LORazepam (ATIVAN) tablet 2 mg  2 mg Oral Q1H PRN

## 2024-02-06 NOTE — PLAN OF CARE
Mobility:  Ambulation/Gait Training:  Gait Training: Yes  Gait  Gait Training: Yes  Overall Level of Assistance: Moderate assistance;Minimum assistance  Distance (ft): 2 Feet  Assistive Device: Gait belt;Walker, rolling  Speed/Chitra: Slow;Shuffled  Gait Abnormalities: Decreased step clearance;Shuffling gait      Therapeutic Exercises:     EXERCISE   Sets   Reps   Active Active Assist   Passive Self ROM   Comments   Ankle Pumps  12 [x] [] [] []    Quad Sets/Glut Sets   [] [] [] []    Hamstring Sets   [] [] [] []    Short Arc Quads   [] [] [] []    Heel Slides   [] [] [] []    Straight Leg Raises   [] [] [] []    Hip abd/add   [] [] [] []    Long Arc Quads  12 [x] [] [] []    Marching   [] [] [] []       [] [] [] []       Pain Rating:  Pt reported being in pain all the time but gave no formal rating or placement  Activity Tolerance:   Fair     After treatment patient left in no apparent distress:   Bed locked and returned to lowest position, Patient left in no apparent distress in bed, Call bell within reach, Bed/ chair alarm activated, and Side rails x3, and nsg updated     COMMUNICATION/COLLABORATION:   The patient’s plan of care was discussed with: Registered nurse    Patient Education  Education Given To: Patient  Education Provided: Role of Therapy;Plan of Care;Home Exercise Program;Energy Conservation;Fall Prevention Strategies  Education Method: Verbal  Education Outcome: Verbalized understanding;Continued education needed      Mary Mayers PTA  Minutes: 27

## 2024-02-06 NOTE — PROGRESS NOTES
CM noted discharge order. CM reached out to [facility] to see if they had reviewed patient for admission. John messaged they are waiting for decision from MD.

## 2024-02-07 PROBLEM — I87.2 VENOUS STASIS DERMATITIS OF BOTH LOWER EXTREMITIES: Status: ACTIVE | Noted: 2024-02-07

## 2024-02-07 PROBLEM — R93.89 ABNORMAL CHEST CT: Status: ACTIVE | Noted: 2024-02-07

## 2024-02-07 PROBLEM — I89.0 LYMPHEDEMA: Status: ACTIVE | Noted: 2024-02-07

## 2024-02-07 PROBLEM — R79.82 CRP ELEVATED: Status: ACTIVE | Noted: 2024-02-07

## 2024-02-07 LAB
ALBUMIN SERPL-MCNC: 2.2 G/DL (ref 3.5–5)
ALBUMIN/GLOB SERPL: 0.6 (ref 1.1–2.2)
ALP SERPL-CCNC: 136 U/L (ref 45–117)
ALT SERPL-CCNC: 10 U/L (ref 12–78)
ANION GAP SERPL CALC-SCNC: 6 MMOL/L (ref 5–15)
AST SERPL W P-5'-P-CCNC: 38 U/L (ref 15–37)
BASOPHILS # BLD: 0 K/UL (ref 0–0.1)
BASOPHILS NFR BLD: 0 % (ref 0–1)
BILIRUB SERPL-MCNC: 0.6 MG/DL (ref 0.2–1)
BUN SERPL-MCNC: 3 MG/DL (ref 6–20)
BUN/CREAT SERPL: 8 (ref 12–20)
CA-I BLD-MCNC: 8.8 MG/DL (ref 8.5–10.1)
CHLORIDE SERPL-SCNC: 112 MMOL/L (ref 97–108)
CO2 SERPL-SCNC: 25 MMOL/L (ref 21–32)
CREAT SERPL-MCNC: 0.38 MG/DL (ref 0.7–1.3)
CRP SERPL-MCNC: 3.9 MG/DL (ref 0–0.3)
DIFFERENTIAL METHOD BLD: ABNORMAL
EOSINOPHIL # BLD: 0 K/UL (ref 0–0.4)
EOSINOPHIL NFR BLD: 0 % (ref 0–7)
ERYTHROCYTE [DISTWIDTH] IN BLOOD BY AUTOMATED COUNT: 17.7 % (ref 11.5–14.5)
GLOBULIN SER CALC-MCNC: 3.5 G/DL (ref 2–4)
GLUCOSE SERPL-MCNC: 76 MG/DL (ref 65–100)
HCT VFR BLD AUTO: 33.2 % (ref 36.6–50.3)
HGB BLD-MCNC: 11.2 G/DL (ref 12.1–17)
IMM GRANULOCYTES # BLD AUTO: 0 K/UL (ref 0–0.04)
IMM GRANULOCYTES NFR BLD AUTO: 0 % (ref 0–0.5)
LACTATE SERPL-SCNC: 0.7 MMOL/L (ref 0.4–2)
LYMPHOCYTES # BLD: 2.6 K/UL (ref 0.8–3.5)
LYMPHOCYTES NFR BLD: 34 % (ref 12–49)
MCH RBC QN AUTO: 34 PG (ref 26–34)
MCHC RBC AUTO-ENTMCNC: 33.7 G/DL (ref 30–36.5)
MCV RBC AUTO: 100.9 FL (ref 80–99)
MONOCYTES # BLD: 1.1 K/UL (ref 0–1)
MONOCYTES NFR BLD: 14 % (ref 5–13)
NEUTS SEG # BLD: 3.9 K/UL (ref 1.8–8)
NEUTS SEG NFR BLD: 52 % (ref 32–75)
NRBC # BLD: 0 K/UL (ref 0–0.01)
NRBC BLD-RTO: 0 PER 100 WBC
PLATELET # BLD AUTO: 630 K/UL (ref 150–400)
PLATELET COMMENT: ABNORMAL
PMV BLD AUTO: 9.1 FL (ref 8.9–12.9)
POTASSIUM SERPL-SCNC: 3.6 MMOL/L (ref 3.5–5.1)
PROCALCITONIN SERPL-MCNC: 0.18 NG/ML
PROT SERPL-MCNC: 5.7 G/DL (ref 6.4–8.2)
RBC # BLD AUTO: 3.29 M/UL (ref 4.1–5.7)
RBC MORPH BLD: ABNORMAL
SODIUM SERPL-SCNC: 143 MMOL/L (ref 136–145)
WBC # BLD AUTO: 7.6 K/UL (ref 4.1–11.1)

## 2024-02-07 PROCEDURE — 6360000002 HC RX W HCPCS: Performed by: INTERNAL MEDICINE

## 2024-02-07 PROCEDURE — 85025 COMPLETE CBC W/AUTO DIFF WBC: CPT

## 2024-02-07 PROCEDURE — 2580000003 HC RX 258: Performed by: INTERNAL MEDICINE

## 2024-02-07 PROCEDURE — 6360000002 HC RX W HCPCS: Performed by: PHYSICIAN ASSISTANT

## 2024-02-07 PROCEDURE — 84145 PROCALCITONIN (PCT): CPT

## 2024-02-07 PROCEDURE — 2500000003 HC RX 250 WO HCPCS: Performed by: STUDENT IN AN ORGANIZED HEALTH CARE EDUCATION/TRAINING PROGRAM

## 2024-02-07 PROCEDURE — 6370000000 HC RX 637 (ALT 250 FOR IP): Performed by: STUDENT IN AN ORGANIZED HEALTH CARE EDUCATION/TRAINING PROGRAM

## 2024-02-07 PROCEDURE — 36415 COLL VENOUS BLD VENIPUNCTURE: CPT

## 2024-02-07 PROCEDURE — 97530 THERAPEUTIC ACTIVITIES: CPT

## 2024-02-07 PROCEDURE — 86140 C-REACTIVE PROTEIN: CPT

## 2024-02-07 PROCEDURE — 6370000000 HC RX 637 (ALT 250 FOR IP): Performed by: PSYCHIATRY & NEUROLOGY

## 2024-02-07 PROCEDURE — 2580000003 HC RX 258: Performed by: PHYSICIAN ASSISTANT

## 2024-02-07 PROCEDURE — 80053 COMPREHEN METABOLIC PANEL: CPT

## 2024-02-07 PROCEDURE — 6370000000 HC RX 637 (ALT 250 FOR IP): Performed by: INTERNAL MEDICINE

## 2024-02-07 PROCEDURE — 2060000000 HC ICU INTERMEDIATE R&B

## 2024-02-07 PROCEDURE — 83605 ASSAY OF LACTIC ACID: CPT

## 2024-02-07 PROCEDURE — 99232 SBSQ HOSP IP/OBS MODERATE 35: CPT | Performed by: INTERNAL MEDICINE

## 2024-02-07 PROCEDURE — NBSRV NON-BILLABLE SERVICE: Performed by: INTERNAL MEDICINE

## 2024-02-07 RX ORDER — DOXYCYCLINE HYCLATE 100 MG/1
100 CAPSULE ORAL EVERY 12 HOURS SCHEDULED
Status: DISCONTINUED | OUTPATIENT
Start: 2024-02-07 | End: 2024-02-09 | Stop reason: HOSPADM

## 2024-02-07 RX ADMIN — SODIUM CHLORIDE: 4.5 INJECTION, SOLUTION INTRAVENOUS at 10:00

## 2024-02-07 RX ADMIN — DOXYCYCLINE HYCLATE 100 MG: 100 CAPSULE ORAL at 20:30

## 2024-02-07 RX ADMIN — Medication 400 MG: at 09:09

## 2024-02-07 RX ADMIN — ACETAMINOPHEN 650 MG: 325 TABLET ORAL at 22:09

## 2024-02-07 RX ADMIN — AZITHROMYCIN MONOHYDRATE 500 MG: 500 INJECTION, POWDER, LYOPHILIZED, FOR SOLUTION INTRAVENOUS at 16:06

## 2024-02-07 RX ADMIN — POTASSIUM CHLORIDE 20 MEQ: 1500 TABLET, EXTENDED RELEASE ORAL at 09:09

## 2024-02-07 RX ADMIN — GABAPENTIN 600 MG: 300 CAPSULE ORAL at 16:06

## 2024-02-07 RX ADMIN — SODIUM CHLORIDE, PRESERVATIVE FREE 10 ML: 5 INJECTION INTRAVENOUS at 20:31

## 2024-02-07 RX ADMIN — OLANZAPINE 5 MG: 5 TABLET, ORALLY DISINTEGRATING ORAL at 17:47

## 2024-02-07 RX ADMIN — FOLIC ACID 1 MG: 1 TABLET ORAL at 09:10

## 2024-02-07 RX ADMIN — LORAZEPAM 2 MG: 2 INJECTION INTRAMUSCULAR; INTRAVENOUS at 20:12

## 2024-02-07 RX ADMIN — ENOXAPARIN SODIUM 40 MG: 100 INJECTION SUBCUTANEOUS at 09:07

## 2024-02-07 RX ADMIN — GABAPENTIN 600 MG: 300 CAPSULE ORAL at 20:30

## 2024-02-07 RX ADMIN — LORAZEPAM 2 MG: 2 INJECTION INTRAMUSCULAR; INTRAVENOUS at 04:38

## 2024-02-07 RX ADMIN — METOPROLOL TARTRATE 5 MG: 5 INJECTION INTRAVENOUS at 20:12

## 2024-02-07 RX ADMIN — METOPROLOL TARTRATE 25 MG: 25 TABLET, FILM COATED ORAL at 09:10

## 2024-02-07 RX ADMIN — OLANZAPINE 5 MG: 5 TABLET, ORALLY DISINTEGRATING ORAL at 09:14

## 2024-02-07 RX ADMIN — SODIUM CHLORIDE: 4.5 INJECTION, SOLUTION INTRAVENOUS at 14:15

## 2024-02-07 RX ADMIN — SODIUM CHLORIDE, PRESERVATIVE FREE 10 ML: 5 INJECTION INTRAVENOUS at 09:10

## 2024-02-07 RX ADMIN — SODIUM CHLORIDE, PRESERVATIVE FREE 10 ML: 5 INJECTION INTRAVENOUS at 20:15

## 2024-02-07 RX ADMIN — THIAMINE HCL TAB 100 MG 100 MG: 100 TAB at 09:10

## 2024-02-07 RX ADMIN — VANCOMYCIN HYDROCHLORIDE 1250 MG: 1.25 INJECTION, POWDER, LYOPHILIZED, FOR SOLUTION INTRAVENOUS at 09:07

## 2024-02-07 RX ADMIN — GABAPENTIN 600 MG: 300 CAPSULE ORAL at 09:09

## 2024-02-07 RX ADMIN — PANTOPRAZOLE SODIUM 40 MG: 40 TABLET, DELAYED RELEASE ORAL at 09:09

## 2024-02-07 ASSESSMENT — PAIN SCALES - GENERAL
PAINLEVEL_OUTOF10: 0
PAINLEVEL_OUTOF10: 3

## 2024-02-07 ASSESSMENT — PAIN DESCRIPTION - DESCRIPTORS: DESCRIPTORS: ACHING

## 2024-02-07 ASSESSMENT — PAIN DESCRIPTION - LOCATION: LOCATION: HEAD

## 2024-02-07 NOTE — PROGRESS NOTES
RRT Clinical Rounding Nurse Progress Report      SUBJECTIVE: Patient assessed secondary to elevated Deterioration Index Score.      Vitals:    02/07/24 0840 02/07/24 1115 02/07/24 1141 02/07/24 1656   BP: (!) 152/101 111/67 112/73 (!) 129/97   Pulse: (!) 108 94 96 (!) 105   Resp: 20 20 24 27   Temp: 97.9 °F (36.6 °C) 97.7 °F (36.5 °C) 98.1 °F (36.7 °C) 98.4 °F (36.9 °C)   TempSrc: Axillary Oral Oral Oral   SpO2: 95% 93% 100% 98%   Weight:       Height:            DETERIORATION INDEX SCORE: 52    ASSESSMENT:  Elevated RR at 27    PLAN:   Notified Cindy Sadler RN   Will assess patient.    Marcelina Rubio RN

## 2024-02-07 NOTE — PROGRESS NOTES
Dual skin assessment performed. Scratches noted on inner RLE. BLE are edematous and erythematous. All other areas appear clear, dry, and intact.

## 2024-02-07 NOTE — PROGRESS NOTES
2/7/2024 11:21 AM  NAME: Checo Dey   MRN:  [id number]   Admit Diagnosis: Sepsis (HCC) [A41.9]  Severe sepsis (HCC) [A41.9, R65.20]  Cellulitis of lower extremity, unspecified laterality [L03.119]          Assessment/Plan:   1.  Sinus tachycardia related to alcohol withdrawal and cellulitis/skin infection, improved.  Patient currently in sinus rhythm with a heart rate in 80s.    2.  Leg edema with cellulitis, improved.  Patient on antibiotics.    3.  Alcohol abuse/alcohol dependency    4.  Hypokalemia, improved. K today 3.6.     5.  Will follow as needed.         []       High complexity decision making was performed in this patient at high risk for decompensation with multiple organ involvement.    Subjective:     Checo Dey denies chest pain, dyspnea.  Discussed with RN events overnight.     Review of Systems:    Symptom Y/N Comments  Symptom Y/N Comments   Fever/Chills N   Chest Pain N    Poor Appetite N   Edema N    Cough N   Abdominal Pain N    Sputum N   Joint Pain N    SOB/HERNANDEZ N   Pruritis/Rash N    Nausea/vomit N   Tolerating PT/OT Y    Diarrhea N   Tolerating Diet Y    Constipation N   Other       Could NOT obtain due to:      Objective:      Physical Exam:    Last 24hrs VS reviewed since prior progress note. Most recent are:    BP (!) 152/101   Pulse (!) 108   Temp 97.9 °F (36.6 °C) (Axillary)   Resp 20   Ht 1.626 m (5' 4\")   Wt 84.4 kg (186 lb 1.1 oz)   SpO2 95%   BMI 31.94 kg/m²     Intake/Output Summary (Last 24 hours) at 2/7/2024 1121  Last data filed at 2/7/2024 0111  Gross per 24 hour   Intake 828.41 ml   Output 2400 ml   Net -1571.59 ml          General Appearance: Middle-age male, no distress  Ears/Nose/Mouth/Throat: Hearing grossly normal; moist mucous membranes  Neck: Supple.  Chest: Lungs clear to auscultation bilaterally.  Cardiovascular: Regular  rhythm, rapid rate, S1S2 normal, no murmur.  Abdomen: Soft, non-tender, bowel sounds are active.  Extremities: Minimal edema

## 2024-02-07 NOTE — PROGRESS NOTES
Progress Note  Date:2024       Room:[facility]  Patient Name:Checo Dey     YOB: 1965     Age:58 y.o.        Subjective    Subjective  Patient followed for bilateral lower extremity cellulitis.  No wound cultures available but blood cultures negative so far.  He remains on IV Vancomycin.   WBC normal with decreasing procal and CRP.  Patient lying in bed with his eyes closed.  Still complaining of pain in his legs and feet.  He is pending authorization to go to [facility].    Vitals Last 24 Hours:  TEMPERATURE:  Temp  Av.7 °F (36.5 °C)  Min: 97.3 °F (36.3 °C)  Max: 98.2 °F (36.8 °C)  RESPIRATIONS RANGE: Resp  Av.9  Min: 17  Max: 21  PULSE OXIMETRY RANGE: SpO2  Av.2 %  Min: 93 %  Max: 100 %  PULSE RANGE: Pulse  Av.3  Min: 94  Max: 115  BLOOD PRESSURE RANGE: Systolic (24hrs), Av , Min:111 , Max:169   ; Diastolic (24hrs), Av, Min:67, Max:101       Objective:  Vital signs: (most recent): Blood pressure 111/67, pulse 94, temperature 97.7 °F (36.5 °C), temperature source Oral, resp. rate 20, height 1.626 m (5' 4\"), weight 84.4 kg (186 lb 1.1 oz), SpO2 93 %.        Vitals and nursing note reviewed.   Constitutional:       Appearance: He is ill-appearing.   Abdominal:      General: Bowel sounds are normal. There is no distension.   Genitourinary:     Comments: External urinary device with clear urine  Musculoskeletal:      Cervical back: Neck supple.      Right lower leg: Edema present.      Left lower leg: Edema present.        Legs:       Comments: Erythema both legs has completely resolved, no open wounds or drainage   Skin:  No rash.   Neurological:      General: No focal deficit present.      Mental Status: He is alert and oriented to person, place, and time.   Psychiatric:    ?normal behavior     Labs/Imaging/Diagnostics    Labs:  CBC:  Recent Labs     24  0534 24  0324 24  0924   WBC 8.9 9.4 7.6   RBC 2.58* 3.20* 3.29*   HGB 8.7* 11.0* 11.2*   HCT  26.2* 31.9* 33.2*   .6* 99.7* 100.9*   RDW 17.4* 17.7* 17.7*   * 533* 630*       CHEMISTRIES:  Recent Labs     02/05/24  0534 02/06/24  0324 02/07/24  0924    143 143   K 3.8 3.4* 3.6   * 110* 112*   CO2 26 26 25   BUN 3* 3* 3*   CREATININE 0.32* 0.40* 0.38*   GLUCOSE 76 76 76          Lactic acid 2.58    Procal 0.18 <0.20 <0.28 <0.25 <0.24  CRP  3.90 <4.93 <3.65 <5.65      Blood cultures (2/1) No growth 5 days  Blood cultures (2/1) No growth 5 days    Imaging Last 24 Hours:  CT HEAD WO CONTRAST    Result Date: 2/3/2024       No acute intracranial process. Imaging findings consistent with mild chronic microvascular ischemic change. There is a mild degree of cerebral atrophy.      CTA CHEST W WO CONTRAST    Result Date: 2/3/2024       There is no pulmonary embolism. There is no aortic aneurysm or dissection. Scattered groundglass opacities predominantly in the upper lobes, may be related to pulmonary edema, alternatively multifocal pneumonia. Bibasilar atelectasis with small bilateral pleural effusions. Incidental findings are as described above.          CT TIBIA FIBULA RIGHT WO CONTRAST     Result Date: 2/2/2024  Diffuse soft tissue edema and swelling, compatible with cellulitis. No definite fluid collection to suggest abscess. No gas to suggest necrotizing fasciitis.     CT TIBIA FIBULA LEFT WO CONTRAST     Result Date: 2/2/2024  1. Diffuse subcutaneous edema compatible with the clinical diagnosis of cellulitis. No abscess. No drainable fluid collection         Assessment//Plan           Hospital Problems             Last Modified POA    * (Principal) Sepsis (HCC) 2/1/2024 Yes    Tachycardia 2/6/2024 Yes    Cellulitis of lower extremity 2/5/2024 Yes    Alcohol abuse 2/6/2024 Yes    Multifocal pneumonia 2/5/2024 Yes      Bilateral lymphedema with venous stasis dermatitis and/or cellulitis, R>L, on IV Vancomycin, improving   2. Elevated procal and CRP, secondary to above, resolving    3.

## 2024-02-07 NOTE — DISCHARGE SUMMARY
weeks at discharge to finish remainder of antibiotic therapy for lower extremity cellulitis. PT/OT recommending IRF. Referral sent to [facility], pending.    Discharge Exam:  Patient seen and examined by me on discharge day.  Pertinent Findings:  Patient Vitals for the past 24 hrs:   BP Temp Temp src Pulse Resp SpO2   02/07/24 0840 (!) 152/101 97.9 °F (36.6 °C) Axillary (!) 108 20 95 %   02/07/24 0329 (!) 131/94 97.5 °F (36.4 °C) Oral (!) 103 18 96 %   02/06/24 2255 (!) 169/101 97.9 °F (36.6 °C) Oral (!) 113 18 --   02/06/24 1944 (!) 160/91 98.2 °F (36.8 °C) Oral (!) 115 17 95 %   02/06/24 1458 129/86 97.5 °F (36.4 °C) -- 94 18 100 %   02/06/24 1215 126/82 97.3 °F (36.3 °C) Oral 96 21 98 %         Gen:    Not in distress  Chest: Clear lungs  CVS:   Tachycardia. Regular rhythm.  No edema  Abd:  Soft, not distended, not tender  Neuro: Alert and oriented x3. CN II-XII grossly intact.    Discharge/Recent Laboratory Results:  Recent Labs     02/06/24  0324      K 3.4*   *   CO2 26   BUN 3*   CREATININE 0.40*   GLUCOSE 76   CALCIUM 8.6       Recent Labs     02/06/24  0324   HGB 11.0*   HCT 31.9*   WBC 9.4   *         Discharge Medications:     Medication List        START taking these medications      azithromycin 500 MG tablet  Commonly known as: Zithromax  Take 1 tablet by mouth daily for 3 days     doxycycline hyclate 100 MG capsule  Commonly known as: VIBRAMYCIN  Take 1 capsule by mouth 2 times daily for 21 days     metoprolol tartrate 25 MG tablet  Commonly known as: LOPRESSOR  Take 1 tablet by mouth daily     OLANZapine zydis 5 MG disintegrating tablet  Commonly known as: ZYPREXA  Take 1 tablet by mouth in the morning and 1 tablet in the evening.            CHANGE how you take these medications      furosemide 40 MG tablet  Commonly known as: LASIX  What changed: Another medication with the same name was removed. Continue taking this medication, and follow the directions you see here.             CONTINUE taking these medications      gabapentin 100 MG capsule  Commonly known as: NEURONTIN     magnesium oxide 400 (240 Mg) MG tablet  Commonly known as: MAG-OX  Take 1 tablet by mouth daily     midodrine 2.5 MG tablet  Commonly known as: PROAMATINE  Take 1 tablet by mouth 2 times daily as needed (BP < 100)     pantoprazole 40 MG tablet  Commonly known as: PROTONIX     potassium chloride 20 MEQ extended release tablet  Commonly known as: KLOR-CON M  Take 1 tablet by mouth daily     vitamin B-1 100 MG tablet  Commonly known as: THIAMINE            STOP taking these medications      dicloxacillin 500 MG capsule  Commonly known as: DYNAPEN            ASK your doctor about these medications      traMADol 50 MG tablet  Commonly known as: Ultram  Take 1 tablet by mouth every 6 hours as needed for Pain for up to 7 days. Intended supply: 3 days. Take lowest dose possible to manage pain Max Daily Amount: 200 mg  Ask about: Should I take this medication?               Where to Get Your Medications        These medications were sent to [id number] - [place], [place] - [address] [place] [phone] - F [phone]  [address] [place] [address]      Phone: [phone]   azithromycin 500 MG tablet  doxycycline hyclate 100 MG capsule  metoprolol tartrate 25 MG tablet  OLANZapine zydis 5 MG disintegrating tablet           Disposition: IRF  Code status: FULL  Recommended diet: regular diet  Recommended activity: activity as tolerated  Wound care: None      Follow up with:   PCP : None, None  Follow-up Information       Follow up With Specialties Details Why Contact Info    Stevie Redd MD Infectious Diseases Schedule an appointment as soon as possible for a visit in 3 week(s)  [address] [place] [address] [phone]      Maricarmen Garcia, APRN - NP Nurse Practitioner Schedule an appointment as soon as possible for a visit in 1 week(s) Establish

## 2024-02-07 NOTE — PLAN OF CARE
PHYSICAL THERAPY TREATMENT     Patient: Checo Dey (58 y.o. male)  Date: 2/7/2024  Diagnosis: Sepsis (HCC) [A41.9]  Severe sepsis (HCC) [A41.9, R65.20]  Cellulitis of lower extremity, unspecified laterality [L03.119] Sepsis (HCC)      Precautions: Fall Risk, Bed Alarm                      Recommendations for nursing mobility: Encourage HEP in prep for ADLs/mobility; see handout for details, Frequent repositioning to prevent skin breakdown, Use of bed/chair alarm for safety, and LE elevation for management of edema    In place during session: External Catheter and EKG/telemetry   Chart, physical therapy assessment, plan of care and goals were reviewed.  ASSESSMENT  Patient continues with skilled PT services and is progressing towards goals. Pt in bed upon PT arrival, agreeable to session. Pt A&O x 3 ( disoriented to year) . (See below for objective details and assist levels).     Overall pt tolerated session fair today, confused with poor safety awareness. Pt was able to complete bed mobility with less assist today but req additional time to complete with cueing. Patient demos poor safety awareness and is impulsive throughout session req cues for safety and reorientation. Patient was min A x2 for transfers and taking side steps at EOB as well as standing marches. Pt demos post LOB several times with A x2 to correct for safety.   Will continue to benefit from skilled PT services, and will continue to progress as tolerated. Potential barriers for safe discharge: pt has poor safety awareness, pt has impaired cognition, pt is not safe to be alone, and concern for pt safely navigating or managing the home environment. Current PT DC recommendation Inpatient Rehabilitation Facility  once medically appropriate.     Start of Session End of Session   SPO2 (%) 100 98   Heart Rate (BPM) 96 104     GOALS:    Problem: Physical Therapy - Adult  Goal: By Discharge: Performs mobility at highest level of function for

## 2024-02-07 NOTE — PROGRESS NOTES
CM received message from [employer], patient accepted and they will start auth this am. CM asked liaison to send reference number for auth once started.

## 2024-02-08 LAB
ALBUMIN SERPL-MCNC: 2 G/DL (ref 3.5–5)
ALBUMIN/GLOB SERPL: 0.6 (ref 1.1–2.2)
ALP SERPL-CCNC: 121 U/L (ref 45–117)
ALT SERPL-CCNC: 9 U/L (ref 12–78)
ANION GAP SERPL CALC-SCNC: 5 MMOL/L (ref 5–15)
AST SERPL W P-5'-P-CCNC: 30 U/L (ref 15–37)
BASOPHILS # BLD: 0 K/UL (ref 0–0.1)
BASOPHILS NFR BLD: 1 % (ref 0–1)
BILIRUB SERPL-MCNC: 0.5 MG/DL (ref 0.2–1)
BUN SERPL-MCNC: 5 MG/DL (ref 6–20)
BUN/CREAT SERPL: 6 (ref 12–20)
CA-I BLD-MCNC: 8.4 MG/DL (ref 8.5–10.1)
CHLORIDE SERPL-SCNC: 114 MMOL/L (ref 97–108)
CO2 SERPL-SCNC: 25 MMOL/L (ref 21–32)
CREAT SERPL-MCNC: 0.78 MG/DL (ref 0.7–1.3)
CRP SERPL-MCNC: 2.63 MG/DL (ref 0–0.3)
DIFFERENTIAL METHOD BLD: ABNORMAL
EOSINOPHIL # BLD: 0 K/UL (ref 0–0.4)
EOSINOPHIL NFR BLD: 0 % (ref 0–7)
ERYTHROCYTE [DISTWIDTH] IN BLOOD BY AUTOMATED COUNT: 17.2 % (ref 11.5–14.5)
GLOBULIN SER CALC-MCNC: 3.2 G/DL (ref 2–4)
GLUCOSE SERPL-MCNC: 83 MG/DL (ref 65–100)
HCT VFR BLD AUTO: 29.2 % (ref 36.6–50.3)
HGB BLD-MCNC: 9.8 G/DL (ref 12.1–17)
IMM GRANULOCYTES # BLD AUTO: 0 K/UL (ref 0–0.04)
IMM GRANULOCYTES NFR BLD AUTO: 0 % (ref 0–0.5)
LYMPHOCYTES # BLD: 2.5 K/UL (ref 0.8–3.5)
LYMPHOCYTES NFR BLD: 34 % (ref 12–49)
MCH RBC QN AUTO: 34.3 PG (ref 26–34)
MCHC RBC AUTO-ENTMCNC: 33.6 G/DL (ref 30–36.5)
MCV RBC AUTO: 102.1 FL (ref 80–99)
MONOCYTES # BLD: 1.3 K/UL (ref 0–1)
MONOCYTES NFR BLD: 17 % (ref 5–13)
NEUTS SEG # BLD: 3.6 K/UL (ref 1.8–8)
NEUTS SEG NFR BLD: 48 % (ref 32–75)
NRBC # BLD: 0 K/UL (ref 0–0.01)
NRBC BLD-RTO: 0 PER 100 WBC
PLATELET # BLD AUTO: 592 K/UL (ref 150–400)
PMV BLD AUTO: 9.2 FL (ref 8.9–12.9)
POTASSIUM SERPL-SCNC: 3.3 MMOL/L (ref 3.5–5.1)
PROCALCITONIN SERPL-MCNC: 0.3 NG/ML
PROT SERPL-MCNC: 5.2 G/DL (ref 6.4–8.2)
RBC # BLD AUTO: 2.86 M/UL (ref 4.1–5.7)
SODIUM SERPL-SCNC: 144 MMOL/L (ref 136–145)
WBC # BLD AUTO: 7.5 K/UL (ref 4.1–11.1)

## 2024-02-08 PROCEDURE — 86140 C-REACTIVE PROTEIN: CPT

## 2024-02-08 PROCEDURE — 6360000002 HC RX W HCPCS: Performed by: INTERNAL MEDICINE

## 2024-02-08 PROCEDURE — 85025 COMPLETE CBC W/AUTO DIFF WBC: CPT

## 2024-02-08 PROCEDURE — 2060000000 HC ICU INTERMEDIATE R&B

## 2024-02-08 PROCEDURE — 2500000003 HC RX 250 WO HCPCS: Performed by: STUDENT IN AN ORGANIZED HEALTH CARE EDUCATION/TRAINING PROGRAM

## 2024-02-08 PROCEDURE — 6370000000 HC RX 637 (ALT 250 FOR IP): Performed by: INTERNAL MEDICINE

## 2024-02-08 PROCEDURE — 97535 SELF CARE MNGMENT TRAINING: CPT

## 2024-02-08 PROCEDURE — 6360000002 HC RX W HCPCS: Performed by: PHYSICIAN ASSISTANT

## 2024-02-08 PROCEDURE — 6370000000 HC RX 637 (ALT 250 FOR IP): Performed by: PHYSICIAN ASSISTANT

## 2024-02-08 PROCEDURE — 2580000003 HC RX 258: Performed by: PHYSICIAN ASSISTANT

## 2024-02-08 PROCEDURE — 84145 PROCALCITONIN (PCT): CPT

## 2024-02-08 PROCEDURE — 99232 SBSQ HOSP IP/OBS MODERATE 35: CPT | Performed by: INTERNAL MEDICINE

## 2024-02-08 PROCEDURE — 36415 COLL VENOUS BLD VENIPUNCTURE: CPT

## 2024-02-08 PROCEDURE — 80053 COMPREHEN METABOLIC PANEL: CPT

## 2024-02-08 PROCEDURE — 6370000000 HC RX 637 (ALT 250 FOR IP): Performed by: STUDENT IN AN ORGANIZED HEALTH CARE EDUCATION/TRAINING PROGRAM

## 2024-02-08 PROCEDURE — 97530 THERAPEUTIC ACTIVITIES: CPT

## 2024-02-08 PROCEDURE — 2580000003 HC RX 258: Performed by: INTERNAL MEDICINE

## 2024-02-08 PROCEDURE — 6370000000 HC RX 637 (ALT 250 FOR IP): Performed by: PSYCHIATRY & NEUROLOGY

## 2024-02-08 RX ADMIN — PANTOPRAZOLE SODIUM 40 MG: 40 TABLET, DELAYED RELEASE ORAL at 09:24

## 2024-02-08 RX ADMIN — LORAZEPAM 3 MG: 1 TABLET ORAL at 19:54

## 2024-02-08 RX ADMIN — GABAPENTIN 600 MG: 300 CAPSULE ORAL at 09:24

## 2024-02-08 RX ADMIN — OLANZAPINE 5 MG: 5 TABLET, ORALLY DISINTEGRATING ORAL at 09:24

## 2024-02-08 RX ADMIN — POTASSIUM CHLORIDE 40 MEQ: 1500 TABLET, EXTENDED RELEASE ORAL at 06:06

## 2024-02-08 RX ADMIN — GABAPENTIN 600 MG: 300 CAPSULE ORAL at 19:53

## 2024-02-08 RX ADMIN — LORAZEPAM 2 MG: 2 INJECTION INTRAMUSCULAR; INTRAVENOUS at 18:13

## 2024-02-08 RX ADMIN — POTASSIUM BICARBONATE 40 MEQ: 782 TABLET, EFFERVESCENT ORAL at 09:23

## 2024-02-08 RX ADMIN — FOLIC ACID 1 MG: 1 TABLET ORAL at 09:24

## 2024-02-08 RX ADMIN — LORAZEPAM 2 MG: 1 TABLET ORAL at 09:38

## 2024-02-08 RX ADMIN — OLANZAPINE 5 MG: 5 TABLET, ORALLY DISINTEGRATING ORAL at 17:08

## 2024-02-08 RX ADMIN — SODIUM CHLORIDE, PRESERVATIVE FREE 10 ML: 5 INJECTION INTRAVENOUS at 20:21

## 2024-02-08 RX ADMIN — Medication 400 MG: at 09:24

## 2024-02-08 RX ADMIN — METOPROLOL TARTRATE 25 MG: 25 TABLET, FILM COATED ORAL at 09:24

## 2024-02-08 RX ADMIN — METOPROLOL TARTRATE 5 MG: 5 INJECTION INTRAVENOUS at 04:20

## 2024-02-08 RX ADMIN — SODIUM CHLORIDE, PRESERVATIVE FREE 10 ML: 5 INJECTION INTRAVENOUS at 20:16

## 2024-02-08 RX ADMIN — DOXYCYCLINE HYCLATE 100 MG: 100 CAPSULE ORAL at 19:54

## 2024-02-08 RX ADMIN — ENOXAPARIN SODIUM 40 MG: 100 INJECTION SUBCUTANEOUS at 09:25

## 2024-02-08 RX ADMIN — SODIUM CHLORIDE: 4.5 INJECTION, SOLUTION INTRAVENOUS at 12:43

## 2024-02-08 RX ADMIN — DOXYCYCLINE HYCLATE 100 MG: 100 CAPSULE ORAL at 09:24

## 2024-02-08 RX ADMIN — POTASSIUM CHLORIDE 20 MEQ: 1500 TABLET, EXTENDED RELEASE ORAL at 09:24

## 2024-02-08 RX ADMIN — GABAPENTIN 600 MG: 300 CAPSULE ORAL at 14:40

## 2024-02-08 RX ADMIN — LORAZEPAM 3 MG: 1 TABLET ORAL at 23:14

## 2024-02-08 RX ADMIN — THIAMINE HCL TAB 100 MG 100 MG: 100 TAB at 09:24

## 2024-02-08 RX ADMIN — SODIUM CHLORIDE, PRESERVATIVE FREE 10 ML: 5 INJECTION INTRAVENOUS at 09:25

## 2024-02-08 RX ADMIN — SODIUM CHLORIDE, PRESERVATIVE FREE 10 ML: 5 INJECTION INTRAVENOUS at 09:24

## 2024-02-08 RX ADMIN — AZITHROMYCIN MONOHYDRATE 500 MG: 500 INJECTION, POWDER, LYOPHILIZED, FOR SOLUTION INTRAVENOUS at 14:41

## 2024-02-08 ASSESSMENT — PAIN SCALES - GENERAL: PAINLEVEL_OUTOF10: 0

## 2024-02-08 NOTE — PROGRESS NOTES
Progress Note  Date:2024       Room:[facility]  Patient Name:Checo Dey     YOB: 1965     Age:58 y.o.        Subjective    Subjective  Patient followed for bilateral lower extremity cellulitis.  No wound cultures available but blood cultures negative so far.  He remains on IV Vancomycin.   WBC normal with decreasing procal and CRP.  Patient lying in bed with his eyes closed.  Still complaining of pain in his legs and feet.  He is pending authorization to go to [facility].    Vitals Last 24 Hours:  TEMPERATURE:  Temp  Av °F (36.7 °C)  Min: 97.5 °F (36.4 °C)  Max: 98.4 °F (36.9 °C)  RESPIRATIONS RANGE: Resp  Av.8  Min: 16  Max: 20  PULSE OXIMETRY RANGE: SpO2  Av.8 %  Min: 96 %  Max: 100 %  PULSE RANGE: Pulse  Av.6  Min: 89  Max: 107  BLOOD PRESSURE RANGE: Systolic (24hrs), Av , Min:129 , Max:171   ; Diastolic (24hrs), Av, Min:75, Max:108       Objective:  Vital signs: (most recent): Blood pressure (!) 140/90, pulse (!) 104, temperature 97.5 °F (36.4 °C), temperature source Oral, resp. rate 18, height 1.626 m (5' 4\"), weight 84.4 kg (186 lb 1.1 oz), SpO2 100 %.        Vitals and nursing note reviewed.   Constitutional:       Appearance: He is ill-appearing.   Abdominal:      General: Bowel sounds are normal. There is no distension.   Genitourinary:     Comments: External urinary device with clear urine  Musculoskeletal:      Cervical back: Neck supple.      Right lower leg: Edema present.      Left lower leg: Edema present.        Legs:       Comments: Erythema both legs has completely resolved, no open wounds or drainage   Skin:  No rash.   Neurological:      General: No focal deficit present.      Mental Status: He is alert and oriented to person, place, and time.   Psychiatric:    ?normal behavior     Labs/Imaging/Diagnostics    Labs:  CBC:  Recent Labs     24  0324 24  0924 24  0228   WBC 9.4 7.6 7.5   RBC 3.20* 3.29* 2.86*   HGB 11.0* 11.2* 9.8*

## 2024-02-08 NOTE — PLAN OF CARE
PHYSICAL THERAPY TREATMENT     Patient: hCeco Dey (58 y.o. male)  Date: 2/8/2024  Diagnosis: Sepsis (HCC) [A41.9]  Severe sepsis (HCC) [A41.9, R65.20]  Cellulitis of lower extremity, unspecified laterality [L03.119] Sepsis (HCC)      Precautions: Fall Risk, Bed Alarm                      Recommendations for nursing mobility: Encourage HEP in prep for ADLs/mobility; see handout for details and Use of bed/chair alarm for safety    In place during session: Peripheral IV, External Catheter, and EKG/telemetry   Chart, physical therapy assessment, plan of care and goals were reviewed.  ASSESSMENT  Patient continues with skilled PT services and is progressing towards goals. Pt semi supine upon PT arrival, agreeable to session. Pt A&O x 3. (See below for objective details and assist levels).     Overall pt tolerated session well today. Bed mobility assessed with pt progressing to SBA for supine <> seated EOB transfers requiring cueing for sequencing. Multiple trials STS with pt requiring CGA and max cueing for fand placement and safety awareness. Pt able to stand for several minutes with BUE support using FWW during skyler care. Lateral side stepping performed along EOB with pt demonstrating decreased step clearance, verbal and tactile cueing provided for wt shifting. Will continue to benefit from skilled PT services, and will continue to progress as tolerated. Potential barriers for safe discharge: pt has impaired cognition, pt is a high fall risk, pt is not safe to be alone, and concern for pt safely navigating or managing the home environment. Current PT DC recommendation Inpatient Rehabilitation Facility  once medically appropriate.     Start of Session End of Session   SPO2 (%) 98 99   Heart Rate (BPM) 103 110     GOALS:    Problem: Physical Therapy - Adult  Goal: By Discharge: Performs mobility at highest level of function for planned discharge setting.  See evaluation for individualized  goals.  Description: FUNCTIONAL STATUS PRIOR TO ADMISSION: pt lives with mother, was independent in amb without AD prior to recently becoming weak in in B LE, since then his mother has assisted with ADLs        Physical Therapy Goals  Initiated 2/4/2024  Pt stated goal: to get better    I with LE HEP x7 days  Mod I with bed mob x7 days  SBA with all transfers x7 days  Amb 25-50ft with LRAD and SBAx1 x7 days      Outcome: Progressing          PLAN :  Patient continues to benefit from skilled intervention to address functional impairments. Continue treatment per established plan of care to address goals.    Recommendation for discharge: (in order for the patient to meet his/her long term goals)  Inpatient Rehabilitation Facility        SUBJECTIVE:   Patient stated “Do you see that lady in the chair right there?.”    OBJECTIVE DATA SUMMARY:   Critical Behavior:  Orientation  Overall Orientation Status: Impaired  Orientation Level: Oriented to person;Oriented to place;Oriented to situation;Disoriented to time  Cognition  Overall Cognitive Status: Exceptions  Arousal/Alertness: Inconsistent responses to stimuli  Following Commands: Follows multistep commands with repitition;Follows one step commands with increased time  Attention Span: Attends with cues to redirect;Difficulty dividing attention  Safety Judgement: Decreased awareness of need for assistance;Decreased awareness of need for safety  Problem Solving: Decreased awareness of errors;Assistance required to implement solutions  Insights: Decreased awareness of deficits  Initiation: Requires cues for some  Sequencing: Requires cues for some    Functional Mobility Training:  Bed Mobility:  Bed Mobility Training  Bed Mobility Training: Yes  Overall Level of Assistance: Stand-by assistance  Interventions: Verbal cues;Tactile cues  Rolling: Stand-by assistance;Additional time  Supine to Sit: Stand-by assistance;Additional time  Sit to Supine: Stand-by

## 2024-02-08 NOTE — PROGRESS NOTES
Patient usually lives with his mom but currently awaiting auth at [facility]. Auth started 2/7/24.

## 2024-02-08 NOTE — DISCHARGE SUMMARY
Discharge Summary    Name: Checo Dey  [id number]  YOB: 1965 (Age: 58 y.o.)   Date of Admission: 2/1/2024  Date of Discharge: 2/8/2024  Attending Physician: Antonio Solis MD    Discharge Diagnosis:   Principal Problem:    Sepsis (HCC)  Active Problems:    Tachycardia    Cellulitis of lower extremity    Alcohol abuse    Multifocal pneumonia    Lymphedema    Venous stasis dermatitis of both lower extremities    CRP elevated    Abnormal chest CT  Resolved Problems:    * No resolved hospital problems. *       Consultations:  IP CONSULT TO PSYCHIATRY  IP WOUND CARE NURSE CONSULT TO EVAL  IP CONSULT TO INFECTIOUS DISEASES  IP CONSULT TO SOCIAL WORK  IP CONSULT TO CARDIOLOGY      Brief Admission History/Reason for Admission Per Rocco Hammonds Cha, MD:   Recurrent cellulitis     Brief Hospital Course by Main Problems:   Checo Dey is a 58-year-old male with a PMH of bilateral lower extremity swelling, alcohol use disorder, neuropathy, and recurrent cellulitis who was admitted on 02/01 for cellulitis unresponsive to PO antibiotics in the outpatient setting. Associated bilateral leg erythema and tenderness. Reported numbness at baseline. Patient also reports episodes of syncope where he wakes up and does not remember \"how he got to where he is.\" Tachycardic, tachypneic and hypotensive overnight at 81/50 on 02/01/24. LA 2.58. Started on vancomycin for empiric coverage. ID consulted. CXR no acute processes on 02/01. Duplex US bilateral lower ext negative for DVT. Legs have improved. Sepsis resolved, likely secondary to bilateral lower extremity cellulitis. Persistent tachycardia. Cardiac consultation. CTA of the chest revealed no obvious pulmonary emboli although there is patchy infiltrates consistent with atypical pneumonia. Started on Zithromax for atypical pneumonia coverage. COVID and influenza negative. On room air. Tachycardia likely related to alcohol withdrawal and  [facility] [address]  [phone]      Maricarmen Garcia, APRN - NP Nurse Practitioner Schedule an appointment as soon as possible for a visit in 1 week(s) Establish  [facility] [address]  [phone]      Getachew Jerez MD Cardiology Schedule an appointment as soon as possible for a visit in 1 week(s) Outpatient follow-up tachycardia [address]  [facility] [address]  [phone]                  Total time in minutes spent coordinating this discharge (includes going over instructions, follow-up, prescriptions, and preparing report for sign off to her PCP) :  35 minutes

## 2024-02-08 NOTE — PROGRESS NOTES
OCCUPATIONAL THERAPY TREATMENT  Patient: Checo Dey (58 y.o. male)  Date: 2/8/2024  Primary Diagnosis: Sepsis (HCC) [A41.9]  Severe sepsis (HCC) [A41.9, R65.20]  Cellulitis of lower extremity, unspecified laterality [L03.119]       Precautions: Fall Risk, Bed Alarm                Recommendations for nursing mobility: Use of bed/chair alarm for safety, AD and gt belt for bed to chair , Amb to bathroom with AD and gait belt, and Assist x1    In place during session: Peripheral IV and External Catheter  Chart, occupational therapy assessment, plan of care, and goals were reviewed.    ASSESSMENT  Patient continues with skilled OT services and is progressing towards goals.  Pt received semi-supine in bed upon arrival, AXO x 3 and agreeable to WEBSTER tx at this time. Pt cooperative and demonstrated fair effort during activities. Pt confused and talking about past in the present. Pt req's SBA for all bed mobility<>EOB using bed rail with vc's to wait for therapist as pt tried to stand. Pt completed  STS and bed>toilet>sink>bed td using AD with CGA for balance and max cues for pacing self, correct hand placement and RW mgmt for safety. Standing at sink, pt completed grooming with LOB 1x however pt able to correct with manual cues. See below for objective details and assist levels.     Overall, pt limited by cognitive deficits, decreased standing balance and weakness that interferes with performance in ADL's and functional tf's safely.  Will continue to progress. Potential barriers for safe discharge pt has poor safety awareness, pt has impaired cognition, pt is a high fall risk, pt is not safe to be alone, and concern for pt safely navigating or managing the home environment. Current OT recommendations for discharge Inpatient Rehabilitation Facility .           Start of session End of session   SPO2 (%) 97 105   Heart Rate (BPM) 96 100         GOALS:    Problem: Occupational Therapy - Adult  Goal: By Discharge:

## 2024-02-09 VITALS
BODY MASS INDEX: 27.61 KG/M2 | DIASTOLIC BLOOD PRESSURE: 65 MMHG | WEIGHT: 161.7 LBS | HEIGHT: 64 IN | OXYGEN SATURATION: 95 % | HEART RATE: 90 BPM | RESPIRATION RATE: 18 BRPM | TEMPERATURE: 97.5 F | SYSTOLIC BLOOD PRESSURE: 97 MMHG

## 2024-02-09 LAB
ALBUMIN SERPL-MCNC: 1.9 G/DL (ref 3.5–5)
ALBUMIN/GLOB SERPL: 0.6 (ref 1.1–2.2)
ALP SERPL-CCNC: 121 U/L (ref 45–117)
ALT SERPL-CCNC: 8 U/L (ref 12–78)
ANION GAP SERPL CALC-SCNC: 6 MMOL/L (ref 5–15)
AST SERPL W P-5'-P-CCNC: 30 U/L (ref 15–37)
BASOPHILS # BLD: 0 K/UL (ref 0–0.1)
BASOPHILS NFR BLD: 0 % (ref 0–1)
BILIRUB SERPL-MCNC: 0.4 MG/DL (ref 0.2–1)
BUN SERPL-MCNC: 10 MG/DL (ref 6–20)
BUN/CREAT SERPL: 9 (ref 12–20)
CA-I BLD-MCNC: 8.4 MG/DL (ref 8.5–10.1)
CHLORIDE SERPL-SCNC: 111 MMOL/L (ref 97–108)
CO2 SERPL-SCNC: 25 MMOL/L (ref 21–32)
CREAT SERPL-MCNC: 1.14 MG/DL (ref 0.7–1.3)
CRP SERPL-MCNC: 1.85 MG/DL (ref 0–0.3)
DIFFERENTIAL METHOD BLD: ABNORMAL
EOSINOPHIL # BLD: 0.2 K/UL (ref 0–0.4)
EOSINOPHIL NFR BLD: 2 % (ref 0–7)
ERYTHROCYTE [DISTWIDTH] IN BLOOD BY AUTOMATED COUNT: 17.5 % (ref 11.5–14.5)
GLOBULIN SER CALC-MCNC: 3 G/DL (ref 2–4)
GLUCOSE SERPL-MCNC: 86 MG/DL (ref 65–100)
HCT VFR BLD AUTO: 25.8 % (ref 36.6–50.3)
HGB BLD-MCNC: 8.6 G/DL (ref 12.1–17)
IMM GRANULOCYTES # BLD AUTO: 0 K/UL (ref 0–0.04)
IMM GRANULOCYTES NFR BLD AUTO: 0 % (ref 0–0.5)
LYMPHOCYTES # BLD: 3.4 K/UL (ref 0.8–3.5)
LYMPHOCYTES NFR BLD: 38 % (ref 12–49)
MCH RBC QN AUTO: 34 PG (ref 26–34)
MCHC RBC AUTO-ENTMCNC: 33.3 G/DL (ref 30–36.5)
MCV RBC AUTO: 102 FL (ref 80–99)
MONOCYTES # BLD: 1.5 K/UL (ref 0–1)
MONOCYTES NFR BLD: 17 % (ref 5–13)
NEUTS SEG # BLD: 3.8 K/UL (ref 1.8–8)
NEUTS SEG NFR BLD: 43 % (ref 32–75)
NRBC # BLD: 0 K/UL (ref 0–0.01)
NRBC BLD-RTO: 0 PER 100 WBC
PLATELET # BLD AUTO: 601 K/UL (ref 150–400)
PMV BLD AUTO: 9.3 FL (ref 8.9–12.9)
POTASSIUM SERPL-SCNC: 4.1 MMOL/L (ref 3.5–5.1)
PROT SERPL-MCNC: 4.9 G/DL (ref 6.4–8.2)
RBC # BLD AUTO: 2.53 M/UL (ref 4.1–5.7)
SODIUM SERPL-SCNC: 142 MMOL/L (ref 136–145)
WBC # BLD AUTO: 8.9 K/UL (ref 4.1–11.1)

## 2024-02-09 PROCEDURE — 6370000000 HC RX 637 (ALT 250 FOR IP): Performed by: PHYSICIAN ASSISTANT

## 2024-02-09 PROCEDURE — 2580000003 HC RX 258: Performed by: INTERNAL MEDICINE

## 2024-02-09 PROCEDURE — 6370000000 HC RX 637 (ALT 250 FOR IP): Performed by: STUDENT IN AN ORGANIZED HEALTH CARE EDUCATION/TRAINING PROGRAM

## 2024-02-09 PROCEDURE — 86140 C-REACTIVE PROTEIN: CPT

## 2024-02-09 PROCEDURE — 6370000000 HC RX 637 (ALT 250 FOR IP): Performed by: INTERNAL MEDICINE

## 2024-02-09 PROCEDURE — 36415 COLL VENOUS BLD VENIPUNCTURE: CPT

## 2024-02-09 PROCEDURE — 85025 COMPLETE CBC W/AUTO DIFF WBC: CPT

## 2024-02-09 PROCEDURE — 6370000000 HC RX 637 (ALT 250 FOR IP): Performed by: PSYCHIATRY & NEUROLOGY

## 2024-02-09 PROCEDURE — 6360000002 HC RX W HCPCS: Performed by: INTERNAL MEDICINE

## 2024-02-09 PROCEDURE — 80053 COMPREHEN METABOLIC PANEL: CPT

## 2024-02-09 PROCEDURE — 2580000003 HC RX 258: Performed by: PHYSICIAN ASSISTANT

## 2024-02-09 RX ADMIN — FOLIC ACID 1 MG: 1 TABLET ORAL at 08:46

## 2024-02-09 RX ADMIN — POTASSIUM CHLORIDE 20 MEQ: 1500 TABLET, EXTENDED RELEASE ORAL at 08:45

## 2024-02-09 RX ADMIN — OLANZAPINE 5 MG: 5 TABLET, ORALLY DISINTEGRATING ORAL at 08:45

## 2024-02-09 RX ADMIN — Medication 400 MG: at 08:45

## 2024-02-09 RX ADMIN — METOPROLOL TARTRATE 25 MG: 25 TABLET, FILM COATED ORAL at 08:45

## 2024-02-09 RX ADMIN — GABAPENTIN 600 MG: 300 CAPSULE ORAL at 08:45

## 2024-02-09 RX ADMIN — PANTOPRAZOLE SODIUM 40 MG: 40 TABLET, DELAYED RELEASE ORAL at 08:47

## 2024-02-09 RX ADMIN — LORAZEPAM 2 MG: 1 TABLET ORAL at 08:51

## 2024-02-09 RX ADMIN — ENOXAPARIN SODIUM 40 MG: 100 INJECTION SUBCUTANEOUS at 08:45

## 2024-02-09 RX ADMIN — SODIUM CHLORIDE, PRESERVATIVE FREE 10 ML: 5 INJECTION INTRAVENOUS at 08:45

## 2024-02-09 RX ADMIN — THIAMINE HCL TAB 100 MG 100 MG: 100 TAB at 08:46

## 2024-02-09 RX ADMIN — DOXYCYCLINE HYCLATE 100 MG: 100 CAPSULE ORAL at 08:47

## 2024-02-09 RX ADMIN — SODIUM CHLORIDE, PRESERVATIVE FREE 10 ML: 5 INJECTION INTRAVENOUS at 08:46

## 2024-02-09 NOTE — DISCHARGE SUMMARY
Discharge Summary    Name: Checo Dey  [id number]  YOB: 1965 (Age: 58 y.o.)   Date of Admission: 2/1/2024  Date of Discharge: 2/9/2024  Attending Physician: Antonio Solis MD    Discharge Diagnosis:   Principal Problem:    Sepsis (HCC)  Active Problems:    Tachycardia    Cellulitis of lower extremity    Alcohol abuse    Multifocal pneumonia    Lymphedema    Venous stasis dermatitis of both lower extremities    CRP elevated    Abnormal chest CT  Resolved Problems:    * No resolved hospital problems. *       Consultations:  IP CONSULT TO PSYCHIATRY  IP WOUND CARE NURSE CONSULT TO EVAL  IP CONSULT TO INFECTIOUS DISEASES  IP CONSULT TO SOCIAL WORK  IP CONSULT TO CARDIOLOGY      Brief Admission History/Reason for Admission Per Rocco Hammonds Cha, MD:   Recurrent cellulitis     Brief Hospital Course by Main Problems:   Checo Dey is a 58-year-old male with a PMH of bilateral lower extremity swelling, alcohol use disorder, neuropathy, and recurrent cellulitis who was admitted on 02/01 for cellulitis unresponsive to PO antibiotics in the outpatient setting. Associated bilateral leg erythema and tenderness. Reported numbness at baseline. Patient also reports episodes of syncope where he wakes up and does not remember \"how he got to where he is.\" Tachycardic, tachypneic and hypotensive overnight at 81/50 on 02/01/24. LA 2.58. Started on vancomycin for empiric coverage. ID consulted. CXR no acute processes on 02/01. Duplex US bilateral lower ext negative for DVT. Legs have improved. Sepsis resolved, likely secondary to bilateral lower extremity cellulitis. Persistent tachycardia. Cardiac consultation. CTA of the chest revealed no obvious pulmonary emboli although there is patchy infiltrates consistent with atypical pneumonia. Started on Zithromax for atypical pneumonia coverage. COVID and influenza negative. On room air. Tachycardia likely related to alcohol withdrawal and  name was removed. Continue taking this medication, and follow the directions you see here.            CONTINUE taking these medications      gabapentin 100 MG capsule  Commonly known as: NEURONTIN     magnesium oxide 400 (240 Mg) MG tablet  Commonly known as: MAG-OX  Take 1 tablet by mouth daily     midodrine 2.5 MG tablet  Commonly known as: PROAMATINE  Take 1 tablet by mouth 2 times daily as needed (BP < 100)     pantoprazole 40 MG tablet  Commonly known as: PROTONIX     potassium chloride 20 MEQ extended release tablet  Commonly known as: KLOR-CON M  Take 1 tablet by mouth daily     vitamin B-1 100 MG tablet  Commonly known as: THIAMINE            STOP taking these medications      dicloxacillin 500 MG capsule  Commonly known as: DYNAPEN            ASK your doctor about these medications      azithromycin 500 MG tablet  Commonly known as: Zithromax  Take 1 tablet by mouth daily for 3 days  Ask about: Should I take this medication?     traMADol 50 MG tablet  Commonly known as: Ultram  Take 1 tablet by mouth every 6 hours as needed for Pain for up to 7 days. Intended supply: 3 days. Take lowest dose possible to manage pain Max Daily Amount: 200 mg  Ask about: Should I take this medication?               Where to Get Your Medications        These medications were sent to [id number] - [place], [place] - [address] [facility] [phone] - F [phone]  [address] [place] [address]      Phone: [phone]   azithromycin 500 MG tablet  doxycycline hyclate 100 MG capsule  metoprolol tartrate 25 MG tablet  OLANZapine zydis 5 MG disintegrating tablet           Disposition: IRF  Code status: FULL  Recommended diet: regular diet  Recommended activity: activity as tolerated  Wound care: None      Follow up with:   PCP : None, None  Follow-up Information       Follow up With Specialties Details Why Contact Info    Stevie Redd MD Infectious Diseases Schedule an appointment as soon

## 2024-02-09 NOTE — PROGRESS NOTES
CM noted discharge order. CM received message that auth obtained and bed available at [facility] after 2 pm today. Transport to be arranged for patient as he has had multiple falls and is confused at times.     Nursing report can be called to [phone]. CM left message for patient's mom to return call to inform of discharge.       1122 am  Patient's mom returned call to  and was informed of patient discharge to [facility] today.

## 2024-02-10 LAB
BACTERIA SPEC CULT: NORMAL
BACTERIA SPEC CULT: NORMAL
Lab: NORMAL
Lab: NORMAL

## 2024-02-14 ENCOUNTER — HOSPITAL ENCOUNTER (OUTPATIENT)
Facility: HOSPITAL | Age: 59
Setting detail: SPECIMEN
Discharge: HOME | End: 2024-02-17

## 2024-02-14 LAB
ANION GAP SERPL CALC-SCNC: 5 MMOL/L (ref 5–15)
BASOPHILS # BLD: 0.1 K/UL (ref 0–0.1)
BASOPHILS NFR BLD: 1 % (ref 0–1)
BUN SERPL-MCNC: 14 MG/DL (ref 6–20)
BUN/CREAT SERPL: 11 (ref 12–20)
CA-I BLD-MCNC: 9.4 MG/DL (ref 8.5–10.1)
CHLORIDE SERPL-SCNC: 106 MMOL/L (ref 97–108)
CO2 SERPL-SCNC: 28 MMOL/L (ref 21–32)
CREAT SERPL-MCNC: 1.33 MG/DL (ref 0.7–1.3)
DIFFERENTIAL METHOD BLD: ABNORMAL
EOSINOPHIL # BLD: 0.2 K/UL (ref 0–0.4)
EOSINOPHIL NFR BLD: 2 % (ref 0–7)
ERYTHROCYTE [DISTWIDTH] IN BLOOD BY AUTOMATED COUNT: 17.1 % (ref 11.5–14.5)
GLUCOSE SERPL-MCNC: 91 MG/DL (ref 65–100)
HCT VFR BLD AUTO: 27.6 % (ref 36.6–50.3)
HGB BLD-MCNC: 9 G/DL (ref 12.1–17)
IMM GRANULOCYTES # BLD AUTO: 0.1 K/UL (ref 0–0.04)
IMM GRANULOCYTES NFR BLD AUTO: 1 % (ref 0–0.5)
LYMPHOCYTES # BLD: 3 K/UL (ref 0.8–3.5)
LYMPHOCYTES NFR BLD: 33 % (ref 12–49)
MCH RBC QN AUTO: 34 PG (ref 26–34)
MCHC RBC AUTO-ENTMCNC: 32.6 G/DL (ref 30–36.5)
MCV RBC AUTO: 104.2 FL (ref 80–99)
MONOCYTES # BLD: 1.2 K/UL (ref 0–1)
MONOCYTES NFR BLD: 14 % (ref 5–13)
NEUTS SEG # BLD: 4.5 K/UL (ref 1.8–8)
NEUTS SEG NFR BLD: 49 % (ref 32–75)
NRBC # BLD: 0 K/UL (ref 0–0.01)
NRBC BLD-RTO: 0 PER 100 WBC
PLATELET # BLD AUTO: 600 K/UL (ref 150–400)
PMV BLD AUTO: 9.3 FL (ref 8.9–12.9)
POTASSIUM SERPL-SCNC: 3.7 MMOL/L (ref 3.5–5.1)
RBC # BLD AUTO: 2.65 M/UL (ref 4.1–5.7)
SODIUM SERPL-SCNC: 139 MMOL/L (ref 136–145)
WBC # BLD AUTO: 9 K/UL (ref 4.1–11.1)

## 2024-02-14 PROCEDURE — 85025 COMPLETE CBC W/AUTO DIFF WBC: CPT

## 2024-02-14 PROCEDURE — 80048 BASIC METABOLIC PNL TOTAL CA: CPT

## 2024-03-07 ENCOUNTER — OFFICE VISIT (OUTPATIENT)
Age: 59
End: 2024-03-07
Payer: MEDICAID

## 2024-03-07 VITALS
SYSTOLIC BLOOD PRESSURE: 127 MMHG | BODY MASS INDEX: 28.41 KG/M2 | DIASTOLIC BLOOD PRESSURE: 80 MMHG | TEMPERATURE: 98 F | HEIGHT: 64 IN | WEIGHT: 166.4 LBS | HEART RATE: 117 BPM | OXYGEN SATURATION: 96 %

## 2024-03-07 DIAGNOSIS — R21 RASH OF BOTH HANDS: ICD-10-CM

## 2024-03-07 DIAGNOSIS — R23.8 SKIN IRRITATION: Primary | ICD-10-CM

## 2024-03-07 DIAGNOSIS — I87.2 VENOUS STASIS DERMATITIS OF BOTH LOWER EXTREMITIES: ICD-10-CM

## 2024-03-07 DIAGNOSIS — L03.119 CELLULITIS OF LOWER EXTREMITY, UNSPECIFIED LATERALITY: ICD-10-CM

## 2024-03-07 PROCEDURE — 99213 OFFICE O/P EST LOW 20 MIN: CPT | Performed by: INTERNAL MEDICINE

## 2024-03-07 RX ORDER — LIDOCAINE 40 MG/G
CREAM TOPICAL PRN
Status: SHIPPED | OUTPATIENT
Start: 2024-03-07

## 2024-03-07 ASSESSMENT — ENCOUNTER SYMPTOMS: COLOR CHANGE: 1

## 2024-03-07 NOTE — PROGRESS NOTES
Chief Complaint   Patient presents with    Follow-Up from Hospital    Cellulitis     Hands and lower extremities      /80 (Site: Left Upper Arm, Position: Sitting, Cuff Size: Medium Adult)   Pulse (!) 117   Temp 98 °F (36.7 °C) (Temporal)   Ht 1.626 m (5' 4\")   Wt 75.5 kg (166 lb 6.4 oz)   SpO2 96%   BMI 28.56 kg/m²     1. Have you been to the ER, urgent care clinic since your last visit?  Hospitalized since your last visit? 02/01/24 Cellulitis     2. Have you seen or consulted any other health care providers outside of the [facility] since your last visit?  Include any pap smears or colon screening. No

## 2024-03-07 NOTE — PROGRESS NOTES
Subjective    Checo Dey is a 58 y.o. male    HPI  Patient followed for bilateral lower extremity cellulitis in the setting of chronic venous stasis followed at [facility].  He responded to IV Vancomycin with decreased erythema, procal and CRP. He was discharged to [facility] on Doxycycline which he states that he remains on.  He is here in [facility] for follow-up and complaining again of redness and pain involving his calves.  In addition, he has developed a rash on his hands that is also reportedly painful.  Denies any chemical or irritant exposure with respect to his hands.   Requesting pain medications.      Review of Systems   Constitutional:  Negative for chills, diaphoresis and fever.   Cardiovascular:  Positive for leg swelling.   Musculoskeletal: Negative.    Skin:  Positive for color change and rash. Negative for wound.         Past Medical History:   Diagnosis Date    Bilateral lower extremity edema     Gastrointestinal disorder     Hypertension         No past surgical history on file.     Vitals:    03/07/24 1347   BP: 127/80   Site: Left Upper Arm   Position: Sitting   Cuff Size: Medium Adult   Pulse: (!) 117   Temp: 98 °F (36.7 °C)   TempSrc: Temporal   SpO2: 96%   Weight: 75.5 kg (166 lb 6.4 oz)   Height: 1.626 m (5' 4\")       Objective  Physical Exam  Vitals and nursing note reviewed.   Constitutional:       Appearance: He is not ill-appearing.   HENT:      Head: Normocephalic and atraumatic.   Musculoskeletal:        Arms:       Right lower leg: Edema present.      Left lower leg: Edema present.        Legs:       Comments:  Scaly erythematous rash both hands, palms largely spared, no open wounds   Skin:     Findings: Rash (both hands) present.   Neurological:      General: No focal deficit present.      Mental Status: He is alert and oriented to person, place, and time.   Psychiatric:         Mood and Affect: Mood normal.         Behavior: Behavior normal.         Thought Content: Thought content

## 2024-03-08 ENCOUNTER — TELEPHONE (OUTPATIENT)
Age: 59
End: 2024-03-08

## 2024-03-08 ENCOUNTER — APPOINTMENT (OUTPATIENT)
Facility: HOSPITAL | Age: 59
End: 2024-03-08
Attending: STUDENT IN AN ORGANIZED HEALTH CARE EDUCATION/TRAINING PROGRAM
Payer: MEDICAID

## 2024-03-08 ENCOUNTER — HOSPITAL ENCOUNTER (EMERGENCY)
Facility: HOSPITAL | Age: 59
Discharge: HOME | End: 2024-03-08
Attending: STUDENT IN AN ORGANIZED HEALTH CARE EDUCATION/TRAINING PROGRAM
Payer: MEDICAID

## 2024-03-08 VITALS
TEMPERATURE: 98.4 F | RESPIRATION RATE: 18 BRPM | HEART RATE: 94 BPM | WEIGHT: 166 LBS | DIASTOLIC BLOOD PRESSURE: 72 MMHG | HEIGHT: 64 IN | SYSTOLIC BLOOD PRESSURE: 98 MMHG | OXYGEN SATURATION: 100 % | BODY MASS INDEX: 28.34 KG/M2

## 2024-03-08 DIAGNOSIS — M79.605 PAIN IN BOTH LOWER EXTREMITIES: Primary | ICD-10-CM

## 2024-03-08 DIAGNOSIS — M79.89 LEG SWELLING: ICD-10-CM

## 2024-03-08 DIAGNOSIS — M79.604 PAIN IN BOTH LOWER EXTREMITIES: Primary | ICD-10-CM

## 2024-03-08 LAB — ECHO BSA: 1.84 M2

## 2024-03-08 PROCEDURE — 6370000000 HC RX 637 (ALT 250 FOR IP): Performed by: STUDENT IN AN ORGANIZED HEALTH CARE EDUCATION/TRAINING PROGRAM

## 2024-03-08 PROCEDURE — 93970 EXTREMITY STUDY: CPT

## 2024-03-08 PROCEDURE — 99284 EMERGENCY DEPT VISIT MOD MDM: CPT

## 2024-03-08 RX ORDER — GABAPENTIN 100 MG/1
200 CAPSULE ORAL 3 TIMES DAILY PRN
Qty: 42 CAPSULE | Refills: 0 | Status: SHIPPED | OUTPATIENT
Start: 2024-03-08 | End: 2024-03-15

## 2024-03-08 RX ORDER — TRAMADOL HYDROCHLORIDE 50 MG/1
50 TABLET ORAL
Status: COMPLETED | OUTPATIENT
Start: 2024-03-08 | End: 2024-03-08

## 2024-03-08 RX ADMIN — TRAMADOL HYDROCHLORIDE 50 MG: 50 TABLET ORAL at 10:28

## 2024-03-08 ASSESSMENT — PAIN - FUNCTIONAL ASSESSMENT: PAIN_FUNCTIONAL_ASSESSMENT: 0-10

## 2024-03-08 ASSESSMENT — PAIN DESCRIPTION - LOCATION: LOCATION: LEG

## 2024-03-08 ASSESSMENT — PAIN SCALES - GENERAL
PAINLEVEL_OUTOF10: 9
PAINLEVEL_OUTOF10: 9

## 2024-03-08 ASSESSMENT — PAIN DESCRIPTION - ORIENTATION: ORIENTATION: RIGHT;LEFT;LOWER

## 2024-03-08 NOTE — TELEPHONE ENCOUNTER
Pt called this morning and would like for you to give him a call in reference to antibiotics and some other questions he has. He can be reached at

## 2024-03-08 NOTE — ED PROVIDER NOTES
[facility] EMERGENCY DEPT  EMERGENCY DEPARTMENT HISTORY AND PHYSICAL EXAM      Date: 3/8/2024  Patient Name: Checo Dey  MRN: [id number]  Birthdate 1965  Date of evaluation: 3/8/2024  Provider: Benito Aguillon MD   Note Started: 10:06 AM EST 3/8/24    HISTORY OF PRESENT ILLNESS     Chief Complaint   Patient presents with    Leg Pain    Leg Swelling       History Provided By: Patient    HPI: Checo Dey is a 58 y.o. male with PMH of HTN and bilateral lower extremity edema comes to the ED with worsening swelling.  He is reporting that he has chronic swelling of the lower extremities bilaterally but has had progressive swelling and pain that thus was sent to the ED for further evaluation and DVT rule out.  He reports history of chronic pain which she was being managed with gabapentin as an outpatient by his PCP.  Aside from concerns for diabetes mellitus no other concerns today.    PAST MEDICAL HISTORY   Past Medical History:  Past Medical History:   Diagnosis Date    Bilateral lower extremity edema     Gastrointestinal disorder     Hypertension        Past Surgical History:  No past surgical history on file.    Family History:  No family history on file.    Social History:  Social History     Tobacco Use    Smoking status: Every Day     Current packs/day: 0.50     Types: Cigarettes    Smokeless tobacco: Never   Substance Use Topics    Alcohol use: Yes     Comment: 1-2 drinks per day    Drug use: Never       Allergies:  No Known Allergies    PCP: None, None    Current Meds:   Current Facility-Administered Medications   Medication Dose Route Frequency Provider Last Rate Last Admin    traMADol (ULTRAM) tablet 50 mg  50 mg Oral NOW Benito Aguillon MD        lidocaine (LMX) 4 % cream   Topical PRN Stevie Redd MD         Current Outpatient Medications   Medication Sig Dispense Refill    metoprolol tartrate (LOPRESSOR) 25 MG tablet Take 1 tablet by mouth daily 60 tablet 3    OLANZapine zydis (ZYPREXA) 5

## 2024-03-08 NOTE — TELEPHONE ENCOUNTER
Called and spoke with patient to schedule ED follow up (DOS 3/8/24) visit, patient scheduled for 4/5/24.

## 2024-03-12 ENCOUNTER — TELEPHONE (OUTPATIENT)
Age: 59
End: 2024-03-12

## 2024-03-12 DIAGNOSIS — R20.8 PAIN OF SKIN: Primary | ICD-10-CM

## 2024-03-12 NOTE — TELEPHONE ENCOUNTER
Pt called in in reference to the lidocaine cream that was prescribed but his pharmacy does not have the prescription

## 2024-03-12 NOTE — TELEPHONE ENCOUNTER
Please let patient know that I called the pharmacy ([facility]) directly and gave verbal order for Lidocaine 5%.  Thanks.

## 2024-03-14 ENCOUNTER — TELEPHONE (OUTPATIENT)
Age: 59
End: 2024-03-14

## 2024-03-21 ENCOUNTER — HOSPITAL ENCOUNTER (EMERGENCY)
Facility: HOSPITAL | Age: 59
Discharge: HOME | End: 2024-03-21
Attending: STUDENT IN AN ORGANIZED HEALTH CARE EDUCATION/TRAINING PROGRAM
Payer: MEDICAID

## 2024-03-21 VITALS
HEIGHT: 67 IN | RESPIRATION RATE: 16 BRPM | HEART RATE: 99 BPM | WEIGHT: 165 LBS | TEMPERATURE: 98.7 F | OXYGEN SATURATION: 99 % | SYSTOLIC BLOOD PRESSURE: 154 MMHG | BODY MASS INDEX: 25.9 KG/M2 | DIASTOLIC BLOOD PRESSURE: 92 MMHG

## 2024-03-21 DIAGNOSIS — G62.9 PERIPHERAL POLYNEUROPATHY: Primary | ICD-10-CM

## 2024-03-21 DIAGNOSIS — E87.6 HYPOKALEMIA: ICD-10-CM

## 2024-03-21 LAB
ALBUMIN SERPL-MCNC: 3.8 G/DL (ref 3.5–5)
ALBUMIN/GLOB SERPL: 1 (ref 1.1–2.2)
ALP SERPL-CCNC: 222 U/L (ref 45–117)
ALT SERPL-CCNC: 40 U/L (ref 12–78)
ANION GAP SERPL CALC-SCNC: 7 MMOL/L (ref 5–15)
AST SERPL W P-5'-P-CCNC: 158 U/L (ref 15–37)
BASOPHILS # BLD: 0 K/UL (ref 0–0.1)
BASOPHILS NFR BLD: 1 % (ref 0–1)
BILIRUB SERPL-MCNC: 0.9 MG/DL (ref 0.2–1)
BUN SERPL-MCNC: 8 MG/DL (ref 6–20)
BUN/CREAT SERPL: 14 (ref 12–20)
CA-I BLD-MCNC: 9.4 MG/DL (ref 8.5–10.1)
CHLORIDE SERPL-SCNC: 103 MMOL/L (ref 97–108)
CO2 SERPL-SCNC: 25 MMOL/L (ref 21–32)
CREAT SERPL-MCNC: 0.58 MG/DL (ref 0.7–1.3)
DIFFERENTIAL METHOD BLD: ABNORMAL
EOSINOPHIL # BLD: 0 K/UL (ref 0–0.4)
EOSINOPHIL NFR BLD: 0 % (ref 0–7)
ERYTHROCYTE [DISTWIDTH] IN BLOOD BY AUTOMATED COUNT: 20.5 % (ref 11.5–14.5)
GLOBULIN SER CALC-MCNC: 3.8 G/DL (ref 2–4)
GLUCOSE SERPL-MCNC: 91 MG/DL (ref 65–100)
HCT VFR BLD AUTO: 38.2 % (ref 36.6–50.3)
HGB BLD-MCNC: 13.3 G/DL (ref 12.1–17)
IMM GRANULOCYTES # BLD AUTO: 0 K/UL (ref 0–0.04)
IMM GRANULOCYTES NFR BLD AUTO: 0 % (ref 0–0.5)
LYMPHOCYTES # BLD: 2.6 K/UL (ref 0.8–3.5)
LYMPHOCYTES NFR BLD: 33 % (ref 12–49)
MAGNESIUM SERPL-MCNC: 1.6 MG/DL (ref 1.6–2.4)
MCH RBC QN AUTO: 36.7 PG (ref 26–34)
MCHC RBC AUTO-ENTMCNC: 34.8 G/DL (ref 30–36.5)
MCV RBC AUTO: 105.5 FL (ref 80–99)
MONOCYTES # BLD: 0.7 K/UL (ref 0–1)
MONOCYTES NFR BLD: 9 % (ref 5–13)
NEUTS SEG # BLD: 4.6 K/UL (ref 1.8–8)
NEUTS SEG NFR BLD: 57 % (ref 32–75)
NRBC # BLD: 0 K/UL (ref 0–0.01)
NRBC BLD-RTO: 0 PER 100 WBC
PLATELET # BLD AUTO: 253 K/UL (ref 150–400)
PMV BLD AUTO: 8.9 FL (ref 8.9–12.9)
POTASSIUM SERPL-SCNC: 3 MMOL/L (ref 3.5–5.1)
PROT SERPL-MCNC: 7.6 G/DL (ref 6.4–8.2)
RBC # BLD AUTO: 3.62 M/UL (ref 4.1–5.7)
SODIUM SERPL-SCNC: 135 MMOL/L (ref 136–145)
WBC # BLD AUTO: 7.9 K/UL (ref 4.1–11.1)

## 2024-03-21 PROCEDURE — 36415 COLL VENOUS BLD VENIPUNCTURE: CPT

## 2024-03-21 PROCEDURE — 99283 EMERGENCY DEPT VISIT LOW MDM: CPT

## 2024-03-21 PROCEDURE — 80053 COMPREHEN METABOLIC PANEL: CPT

## 2024-03-21 PROCEDURE — 85025 COMPLETE CBC W/AUTO DIFF WBC: CPT

## 2024-03-21 PROCEDURE — 6370000000 HC RX 637 (ALT 250 FOR IP): Performed by: STUDENT IN AN ORGANIZED HEALTH CARE EDUCATION/TRAINING PROGRAM

## 2024-03-21 PROCEDURE — 83735 ASSAY OF MAGNESIUM: CPT

## 2024-03-21 RX ORDER — POTASSIUM CHLORIDE 20 MEQ/1
20 TABLET, EXTENDED RELEASE ORAL DAILY
Qty: 30 TABLET | Refills: 0 | Status: SHIPPED | OUTPATIENT
Start: 2024-03-21

## 2024-03-21 RX ORDER — LOPERAMIDE HYDROCHLORIDE 2 MG/1
2 CAPSULE ORAL
Status: COMPLETED | OUTPATIENT
Start: 2024-03-21 | End: 2024-03-21

## 2024-03-21 RX ORDER — TRAMADOL HYDROCHLORIDE 50 MG/1
50 TABLET ORAL
Status: COMPLETED | OUTPATIENT
Start: 2024-03-21 | End: 2024-03-21

## 2024-03-21 RX ADMIN — LOPERAMIDE HYDROCHLORIDE 2 MG: 2 CAPSULE ORAL at 17:59

## 2024-03-21 RX ADMIN — TRAMADOL HYDROCHLORIDE 50 MG: 50 TABLET ORAL at 17:59

## 2024-03-21 ASSESSMENT — PAIN - FUNCTIONAL ASSESSMENT
PAIN_FUNCTIONAL_ASSESSMENT: 0-10
PAIN_FUNCTIONAL_ASSESSMENT: NONE - DENIES PAIN

## 2024-03-21 ASSESSMENT — PAIN SCALES - GENERAL
PAINLEVEL_OUTOF10: 9
PAINLEVEL_OUTOF10: 5

## 2024-03-21 ASSESSMENT — LIFESTYLE VARIABLES
HOW OFTEN DO YOU HAVE A DRINK CONTAINING ALCOHOL: 2-4 TIMES A MONTH
HOW MANY STANDARD DRINKS CONTAINING ALCOHOL DO YOU HAVE ON A TYPICAL DAY: 3 OR 4

## 2024-03-21 ASSESSMENT — PAIN DESCRIPTION - ORIENTATION: ORIENTATION: LEFT;RIGHT

## 2024-03-21 ASSESSMENT — PAIN DESCRIPTION - LOCATION: LOCATION: LEG

## 2024-03-21 NOTE — DISCHARGE INSTRUCTIONS
_______________________________________________________________________  [facility] ([facility]) [facility]  Services: medical, behavioral health  Hours:  Monday: 8:30am to 6:30pm  Tuesday - Friday: 8:30am to 5:00pm  Closed daily for lunch 12p - 1p  Closes at 12p on the 1st Wednesday of each month  Location: [address] [place] [address] (across from bus station)  [phone] (fax)  _______________________________________________________________________   [facility] ([facility]) - [facility]  Services: medical, dental, behavioral health, pharmacy  Accepts most insurances, including Medicare, Medicaid, HMOs  Hours:  Health center: Monday to Friday 8:00am - 5:00pm (open until 6:30pm on Thurs)  Dental: Monday to Friday 7:30am - 3:30pm  Pharmacy: Mon, Tue, Wed, Fri 8:30am - 5:00pm; Thurs 9:30am - 6:00pm  Location: [address] [place], [place] [address] [place] [address]  [phone] (fax)  _______________________________________________________________________   [facility] ([facility]) - [facility]  Services: medical addiction treatment  Accepts most insurances, including Medicare, Medicaid, HMOs  Hours:  Monday: 8:30am - 5:00pm  Tuesday: 7:00am - 5:00pm  Wednesday: 8:30am - 5:00pm  Thursday: 8:30am - 7:00pm  [phone] (fax)  _______________________________________________________________________   [facility]  Services: medical, dental, wellness workshops, behavioral health, addiction services  By appointment:  Cardiology  Rheumatology  [facility] Chronic Care  [facility] Mental Health  Women's Health / GYN  Mammograms and Mammogram Classes  Blood Pressure Screenings  General Health Screenings  Seasonal Flu Shots  Location: [address] [place] [address]  [phone]  _______________________________________________________________________    [facility]  Services: primary care, specialty care, pediatrics, HIV/STD testing, women's health, mental health, dental care, eye care, pharmacy case management  Location: [address] [place] [address]  Clinic phone: [phone]  Pharmacy line: [phone]  Phone lines are available Mon-Thurs 8:00 a.m.-5:00 p.m and Fridays 8:00 a.m.-12:30 p.m.  Clinic Hours  Monday-Thursday: 8:30 a.m.-5:00 p.m.  Friday: 8:30 a.m.-12:00 p.m.  [facility] Department  Call for assistance with social work  [phone]  _______________________________________________________________________

## 2024-03-21 NOTE — ED PROVIDER NOTES
03/21/24 1620   Thu Mar 21, 2024   1619 MDM, 58-year-old male  [BQ]      ED Course User Index  [BQ] Pramod Peterson MD       Records Reviewed (source and summary of external notes): Prior medical records and Nursing notes    SEPSIS Reassessment: {Sepsis reassessment smartlist:08680}    Clinical Management Tools:  {CMT List:21005::\"Not Applicable\"}    Vitals:    Vitals:    03/21/24 1512   BP: (!) 143/84   Pulse: (!) 101   Resp: 18   Temp: 98.6 °F (37 °C)   SpO2: 99%   Weight: 74.8 kg (165 lb)   Height: 1.702 m (5' 7\")        Patient was given the following medications:  Medications   traMADol (ULTRAM) tablet 50 mg (has no administration in time range)   loperamide (IMODIUM) capsule 2 mg (has no administration in time range)       CONSULTS: (Who and What was discussed)  None     Social Determinants affecting Dx or Tx: {Social Determinants:53727}    PROCEDURES   Unless otherwise noted above, none  Procedures      CRITICAL CARE TIME   {critical care smartlist:06946}    ED FINAL IMPRESSION     1. Peripheral polyneuropathy          DISPOSITION/PLAN   DISPOSITION      {ED Dispositions:76995}     PATIENT REFERRED TO:  None, None    In 3 days          DISCHARGE MEDICATIONS:     Medication List        ASK your doctor about these medications      furosemide 40 MG tablet  Commonly known as: LASIX     gabapentin 100 MG capsule  Commonly known as: NEURONTIN  Take 2 capsules by mouth 3 times daily as needed (pain) for up to 7 days. Max Daily Amount: 600 mg     magnesium oxide 400 (240 Mg) MG tablet  Commonly known as: MAG-OX  Take 1 tablet by mouth daily     metoprolol tartrate 25 MG tablet  Commonly known as: LOPRESSOR  Take 1 tablet by mouth daily     midodrine 2.5 MG tablet  Commonly known as: PROAMATINE  Take 1 tablet by mouth 2 times daily as needed (BP < 100)     OLANZapine zydis 5 MG disintegrating tablet  Commonly known as: ZYPREXA  Take 1 tablet by mouth in the morning and 1 tablet in the evening.     pantoprazole  recommended or return to ER should their symptoms worsen.      PATIENT REFERRED TO:  None, None    In 3 days          DISCHARGE MEDICATIONS:     Medication List        ASK your doctor about these medications      furosemide 40 MG tablet  Commonly known as: LASIX     gabapentin 100 MG capsule  Commonly known as: NEURONTIN  Take 2 capsules by mouth 3 times daily as needed (pain) for up to 7 days. Max Daily Amount: 600 mg     magnesium oxide 400 (240 Mg) MG tablet  Commonly known as: MAG-OX  Take 1 tablet by mouth daily     metoprolol tartrate 25 MG tablet  Commonly known as: LOPRESSOR  Take 1 tablet by mouth daily     midodrine 2.5 MG tablet  Commonly known as: PROAMATINE  Take 1 tablet by mouth 2 times daily as needed (BP < 100)     OLANZapine zydis 5 MG disintegrating tablet  Commonly known as: ZYPREXA  Take 1 tablet by mouth in the morning and 1 tablet in the evening.     pantoprazole 40 MG tablet  Commonly known as: PROTONIX     potassium chloride 20 MEQ extended release tablet  Commonly known as: KLOR-CON M  Take 1 tablet by mouth daily     vitamin B-1 100 MG tablet  Commonly known as: THIAMINE                DISCONTINUED MEDICATIONS:  Current Discharge Medication List          I am the Primary Clinician of Record. Pramod Peterson MD (electronically signed)    (Please note that parts of this dictation were completed with voice recognition software. Quite often unanticipated grammatical, syntax, homophones, and other interpretive errors are inadvertently transcribed by the computer software. Please disregards these errors. Please excuse any errors that have escaped final proofreading.)        Pramod Peterson MD  03/28/24 0919

## 2024-04-05 ENCOUNTER — OFFICE VISIT (OUTPATIENT)
Age: 59
End: 2024-04-05
Payer: MEDICAID

## 2024-04-05 VITALS
OXYGEN SATURATION: 95 % | DIASTOLIC BLOOD PRESSURE: 77 MMHG | RESPIRATION RATE: 14 BRPM | TEMPERATURE: 98 F | WEIGHT: 145 LBS | HEART RATE: 75 BPM | SYSTOLIC BLOOD PRESSURE: 110 MMHG | BODY MASS INDEX: 22.76 KG/M2 | HEIGHT: 67 IN

## 2024-04-05 DIAGNOSIS — G62.9 NEUROPATHY: Primary | ICD-10-CM

## 2024-04-05 DIAGNOSIS — I87.2 VENOUS STASIS DERMATITIS OF BOTH LOWER EXTREMITIES: ICD-10-CM

## 2024-04-05 PROCEDURE — 99212 OFFICE O/P EST SF 10 MIN: CPT | Performed by: SURGERY

## 2024-04-05 RX ORDER — GABAPENTIN 300 MG/1
300 CAPSULE ORAL 2 TIMES DAILY
Qty: 60 CAPSULE | Refills: 5 | Status: SHIPPED | OUTPATIENT
Start: 2024-04-05 | End: 2024-10-02

## 2024-04-05 ASSESSMENT — PATIENT HEALTH QUESTIONNAIRE - PHQ9
10. IF YOU CHECKED OFF ANY PROBLEMS, HOW DIFFICULT HAVE THESE PROBLEMS MADE IT FOR YOU TO DO YOUR WORK, TAKE CARE OF THINGS AT HOME, OR GET ALONG WITH OTHER PEOPLE: SOMEWHAT DIFFICULT
2. FEELING DOWN, DEPRESSED OR HOPELESS: SEVERAL DAYS
9. THOUGHTS THAT YOU WOULD BE BETTER OFF DEAD, OR OF HURTING YOURSELF: NOT AT ALL
7. TROUBLE CONCENTRATING ON THINGS, SUCH AS READING THE NEWSPAPER OR WATCHING TELEVISION: SEVERAL DAYS
SUM OF ALL RESPONSES TO PHQ QUESTIONS 1-9: 6
SUM OF ALL RESPONSES TO PHQ QUESTIONS 1-9: 6
1. LITTLE INTEREST OR PLEASURE IN DOING THINGS: MORE THAN HALF THE DAYS
6. FEELING BAD ABOUT YOURSELF - OR THAT YOU ARE A FAILURE OR HAVE LET YOURSELF OR YOUR FAMILY DOWN: NOT AT ALL
SUM OF ALL RESPONSES TO PHQ QUESTIONS 1-9: 6
3. TROUBLE FALLING OR STAYING ASLEEP: NOT AT ALL
SUM OF ALL RESPONSES TO PHQ QUESTIONS 1-9: 6
SUM OF ALL RESPONSES TO PHQ9 QUESTIONS 1 & 2: 3
4. FEELING TIRED OR HAVING LITTLE ENERGY: SEVERAL DAYS
5. POOR APPETITE OR OVEREATING: SEVERAL DAYS
8. MOVING OR SPEAKING SO SLOWLY THAT OTHER PEOPLE COULD HAVE NOTICED. OR THE OPPOSITE, BEING SO FIGETY OR RESTLESS THAT YOU HAVE BEEN MOVING AROUND A LOT MORE THAN USUAL: NOT AT ALL

## 2024-04-05 NOTE — PROGRESS NOTES
Identified pt with two pt identifiers (name and ). Reviewed chart in preparation for visit and have obtained necessary documentation.    Checo Dey is a 58 y.o. male  Chief Complaint   Patient presents with    Follow-up     ED f/u     /77 (Site: Right Upper Arm, Position: Sitting, Cuff Size: Small Adult)   Pulse 75   Temp 98 °F (36.7 °C) (Oral)   Resp 14   Ht 1.702 m (5' 7\")   Wt 65.8 kg (145 lb)   SpO2 95%   BMI 22.71 kg/m²     1. Have you been to the ER, urgent care clinic since your last visit?  Hospitalized since your last visit?no    2. Have you seen or consulted any other health care providers outside of the [facility] since your last visit?  Include any pap smears or colon screening. no

## 2024-04-09 ENCOUNTER — OFFICE VISIT (OUTPATIENT)
Facility: CLINIC | Age: 59
End: 2024-04-09
Payer: MEDICAID

## 2024-04-09 VITALS
HEIGHT: 67 IN | HEART RATE: 115 BPM | RESPIRATION RATE: 18 BRPM | WEIGHT: 142.6 LBS | SYSTOLIC BLOOD PRESSURE: 98 MMHG | BODY MASS INDEX: 22.38 KG/M2 | TEMPERATURE: 98 F | DIASTOLIC BLOOD PRESSURE: 74 MMHG | OXYGEN SATURATION: 100 %

## 2024-04-09 DIAGNOSIS — R21 RASH: ICD-10-CM

## 2024-04-09 DIAGNOSIS — G62.9 PERIPHERAL POLYNEUROPATHY: ICD-10-CM

## 2024-04-09 DIAGNOSIS — G62.9 PERIPHERAL POLYNEUROPATHY: Primary | ICD-10-CM

## 2024-04-09 DIAGNOSIS — Z78.9 ALCOHOL USE: ICD-10-CM

## 2024-04-09 DIAGNOSIS — R79.89 ELEVATED LFTS: ICD-10-CM

## 2024-04-09 DIAGNOSIS — M79.604 BILATERAL LOWER EXTREMITY PAIN: ICD-10-CM

## 2024-04-09 DIAGNOSIS — K21.9 GASTROESOPHAGEAL REFLUX DISEASE, UNSPECIFIED WHETHER ESOPHAGITIS PRESENT: ICD-10-CM

## 2024-04-09 DIAGNOSIS — Z12.11 SCREENING FOR COLON CANCER: ICD-10-CM

## 2024-04-09 DIAGNOSIS — M79.605 BILATERAL LOWER EXTREMITY PAIN: ICD-10-CM

## 2024-04-09 PROCEDURE — 99204 OFFICE O/P NEW MOD 45 MIN: CPT | Performed by: STUDENT IN AN ORGANIZED HEALTH CARE EDUCATION/TRAINING PROGRAM

## 2024-04-09 RX ORDER — SENNOSIDES 8.6 MG
650 CAPSULE ORAL EVERY 8 HOURS PRN
COMMUNITY

## 2024-04-09 RX ORDER — PANTOPRAZOLE SODIUM 40 MG/1
40 TABLET, DELAYED RELEASE ORAL DAILY
Qty: 30 TABLET | Refills: 2 | Status: SHIPPED | OUTPATIENT
Start: 2024-04-09

## 2024-04-09 SDOH — ECONOMIC STABILITY: FOOD INSECURITY: WITHIN THE PAST 12 MONTHS, YOU WORRIED THAT YOUR FOOD WOULD RUN OUT BEFORE YOU GOT MONEY TO BUY MORE.: NEVER TRUE

## 2024-04-09 SDOH — ECONOMIC STABILITY: HOUSING INSECURITY
IN THE LAST 12 MONTHS, WAS THERE A TIME WHEN YOU DID NOT HAVE A STEADY PLACE TO SLEEP OR SLEPT IN A SHELTER (INCLUDING NOW)?: NO

## 2024-04-09 SDOH — ECONOMIC STABILITY: FOOD INSECURITY: WITHIN THE PAST 12 MONTHS, THE FOOD YOU BOUGHT JUST DIDN'T LAST AND YOU DIDN'T HAVE MONEY TO GET MORE.: NEVER TRUE

## 2024-04-09 SDOH — ECONOMIC STABILITY: INCOME INSECURITY: HOW HARD IS IT FOR YOU TO PAY FOR THE VERY BASICS LIKE FOOD, HOUSING, MEDICAL CARE, AND HEATING?: NOT HARD AT ALL

## 2024-04-09 NOTE — PROGRESS NOTES
\"Have you been to the ER, urgent care clinic since your last visit?  Hospitalized since your last visit?\"    YES - When: approximately 03/21/2024 ago.  Where and Why: [facility] for Peripheral Polyneuropathy, Hypokalemia.    “Have you seen or consulted any other health care providers outside of [facility] since your last visit?”    NO        “Have you had a colorectal cancer screening such as a colonoscopy/FIT/Cologuard?    NO    No colonoscopy on file  No cologuard on file  No FIT/FOBT on file   No flexible sigmoidoscopy on file         Click Here for Release of Records Request    Chief Complaint   Patient presents with    New Patient     BP 98/74 (Site: Right Upper Arm, Position: Sitting, Cuff Size: Small Adult)   Pulse (!) 115   Temp 98 °F (36.7 °C) (Oral)   Resp 18   Ht 1.702 m (5' 7\")   Wt 64.7 kg (142 lb 9.6 oz)   SpO2 100%   BMI 22.33 kg/m²

## 2024-04-09 NOTE — PROGRESS NOTES
Checo Dey (: 1965) is a 58 y.o. male, New patient patient, here for evaluation of the following chief complaint(s):  New Patient       ASSESSMENT/PLAN:  Below is the assessment and plan developed based on review of pertinent history, physical exam, labs, studies, and medications.    1. Peripheral polyneuropathy  -     BS - Melvina Chamberlain MD, Neurology, [place]  -     TSH; Future  -     T4, Free; Future  -     Vitamin B12; Future  -     Folate; Future  -     ISMAEL BY IFA W/REFLEX; Future  2. Gastroesophageal reflux disease, unspecified whether esophagitis present  -     pantoprazole (PROTONIX) 40 MG tablet; Take 1 tablet by mouth daily, Disp-30 tablet, R-2Normal  -     Comprehensive Metabolic Panel; Future  -     Lipid Panel; Future  3. Alcohol use  -     Comprehensive Metabolic Panel; Future  -     Lipid Panel; Future  4. Elevated LFTs  -     Comprehensive Metabolic Panel; Future  5. Rash  -     ISMAEL BY IFA W/REFLEX; Future  -     Amb External Referral To Dermatology  6. Screening for colon cancer  -     [facility]  7. Bilateral lower extremity pain  -     diclofenac sodium (VOLTAREN) 1 % GEL; Apply 2 g topically 4 times daily as needed for Pain (legs), Topical, 4 TIMES DAILY PRN Starting Thu 2024, Disp-150 g, R-0, Normal    New patient    Continues to complain of chronic bilateral lower extremity pain, numbness and tingling which has been going on for 4 months.  Blood work has been ordered as above, no previous history of diabetes.  Could related to alcohol use.  Referral to neurology given.  Continue gabapentin.    He has not brought medications.   He is asking for a shower chair and a cane.  Will place message to nurse to order these.    He has been drinking for many years, states he has cut down a lot. Counseled on cessation. Refused addiction health.     He has a rash on his hand since many months. No fever, itching     Return in about 3 months

## 2024-04-11 PROBLEM — G62.9 NEUROPATHY: Status: ACTIVE | Noted: 2024-04-11

## 2024-04-15 ENCOUNTER — TELEPHONE (OUTPATIENT)
Age: 59
End: 2024-04-15

## 2024-04-15 NOTE — TELEPHONE ENCOUNTER
Patient called in and asked for three tubes of lidocaine paste is in extreme pain. Would like prescription filled at [facility] in [place].

## 2024-04-18 ENCOUNTER — TELEPHONE (OUTPATIENT)
Facility: CLINIC | Age: 59
End: 2024-04-18

## 2024-04-18 ASSESSMENT — ENCOUNTER SYMPTOMS
ABDOMINAL PAIN: 0
SHORTNESS OF BREATH: 0

## 2024-04-19 NOTE — TELEPHONE ENCOUNTER
Checo Dey called in again about prescription wants prescription filled asa stated the pain is worsening each day. Stated neuropathy in both lower extremities including ankles feet and toes. Patient is asking for three tubes of lidocaine cream due to tubes being so small. Patient is also asking for refills if possible

## 2024-04-21 ENCOUNTER — HOSPITAL ENCOUNTER (EMERGENCY)
Facility: HOSPITAL | Age: 59
Discharge: HOME | End: 2024-04-21
Attending: EMERGENCY MEDICINE
Payer: MEDICAID

## 2024-04-21 VITALS
WEIGHT: 165 LBS | HEART RATE: 96 BPM | OXYGEN SATURATION: 99 % | HEIGHT: 67 IN | TEMPERATURE: 97.8 F | RESPIRATION RATE: 16 BRPM | BODY MASS INDEX: 25.9 KG/M2 | SYSTOLIC BLOOD PRESSURE: 110 MMHG | DIASTOLIC BLOOD PRESSURE: 87 MMHG

## 2024-04-21 DIAGNOSIS — G62.9 PERIPHERAL POLYNEUROPATHY: Primary | ICD-10-CM

## 2024-04-21 DIAGNOSIS — G89.29 OTHER CHRONIC PAIN: ICD-10-CM

## 2024-04-21 PROCEDURE — 6370000000 HC RX 637 (ALT 250 FOR IP): Performed by: EMERGENCY MEDICINE

## 2024-04-21 PROCEDURE — 99283 EMERGENCY DEPT VISIT LOW MDM: CPT

## 2024-04-21 RX ORDER — TRAMADOL HYDROCHLORIDE 50 MG/1
50 TABLET ORAL EVERY 6 HOURS PRN
Qty: 12 TABLET | Refills: 0 | Status: SHIPPED | OUTPATIENT
Start: 2024-04-21 | End: 2024-04-24

## 2024-04-21 RX ORDER — TRAMADOL HYDROCHLORIDE 50 MG/1
100 TABLET ORAL
Status: COMPLETED | OUTPATIENT
Start: 2024-04-21 | End: 2024-04-21

## 2024-04-21 RX ADMIN — TRAMADOL HYDROCHLORIDE 100 MG: 50 TABLET ORAL at 20:56

## 2024-04-21 ASSESSMENT — PAIN DESCRIPTION - LOCATION: LOCATION: LEG

## 2024-04-21 ASSESSMENT — PAIN - FUNCTIONAL ASSESSMENT: PAIN_FUNCTIONAL_ASSESSMENT: 0-10

## 2024-04-21 ASSESSMENT — LIFESTYLE VARIABLES
HOW MANY STANDARD DRINKS CONTAINING ALCOHOL DO YOU HAVE ON A TYPICAL DAY: 3 OR 4
HOW OFTEN DO YOU HAVE A DRINK CONTAINING ALCOHOL: 4 OR MORE TIMES A WEEK

## 2024-04-21 ASSESSMENT — PAIN DESCRIPTION - ORIENTATION: ORIENTATION: RIGHT;LEFT

## 2024-04-21 ASSESSMENT — PAIN SCALES - GENERAL
PAINLEVEL_OUTOF10: 10
PAINLEVEL_OUTOF10: 10

## 2024-04-22 ENCOUNTER — TELEPHONE (OUTPATIENT)
Age: 59
End: 2024-04-22

## 2024-04-22 NOTE — TELEPHONE ENCOUNTER
Patient called today requesting a refill on gabapentin. He needs a higher dose because he increased it because he needed to. He uses [facility] in [place].  [phone]

## 2024-04-22 NOTE — ED TRIAGE NOTES
EMS reports called for bilateral leg pain from neuropathy. Pt ran out of home medications, usually take gabapentin.

## 2024-04-22 NOTE — TELEPHONE ENCOUNTER
Spoke with Dr. Stevens about a refill om gabapentin.  I will call patient back when Dr. Stevens gives me an answer

## 2024-04-22 NOTE — ED NOTES
Transportation has been arranged for this patient through insurance.  NO ETA.  May take up to 3hours.  Trip number [id number]

## 2024-04-22 NOTE — ED PROVIDER NOTES
[facility] EMERGENCY DEPT  EMERGENCY DEPARTMENT HISTORY AND PHYSICAL EXAM      Date: 4/21/2024  Patient Name: Checo Dey  MRN: [id number]  YOB: 1965  Date of evaluation: 4/21/2024  Provider: Marcus Elias MD   Note Started: 9:24 PM EDT 4/21/24    HISTORY OF PRESENT ILLNESS     Chief Complaint   Patient presents with    Leg Pain       History Provided By: Patient    HPI: Checo Dey is a 58 y.o. male presents for evaluation of bilateral leg pain.  Patient states he has chronic pain in his legs, is seen by neurology for his neuropathy.  Patient states he was taking extra of his gabapentin at home and has run out.  Denies fevers or chills, denies nausea or vomiting.    PAST MEDICAL HISTORY   Past Medical History:  Past Medical History:   Diagnosis Date    Bilateral lower extremity edema     Gastrointestinal disorder     Hypertension        Past Surgical History:  Past Surgical History:   Procedure Laterality Date    APPENDECTOMY         Family History:  No family history on file.    Social History:  Social History     Tobacco Use    Smoking status: Every Day     Current packs/day: 0.50     Average packs/day: 0.5 packs/day for 30.0 years (15.0 ttl pk-yrs)     Types: Cigarettes     Start date: 4/9/1994    Smokeless tobacco: Never   Vaping Use    Vaping Use: Never used   Substance Use Topics    Alcohol use: Yes     Comment: 1-2 drinks per day    Drug use: Never       Allergies:  No Known Allergies    PCP: Ethel Kauffman MD    Current Meds:   Current Facility-Administered Medications   Medication Dose Route Frequency Provider Last Rate Last Admin    lidocaine (LMX) 4 % cream   Topical PRN Stevie Redd MD         Current Outpatient Medications   Medication Sig Dispense Refill    traMADol (ULTRAM) 50 MG tablet Take 1 tablet by mouth every 6 hours as needed for Pain for up to 3 days. Intended supply: 3 days. Take lowest dose possible to manage pain Max Daily Amount: 200 mg 12 tablet 0

## 2024-04-22 NOTE — TELEPHONE ENCOUNTER
Patient calling to let us know he needs a RX filled, its lidocaine cream he said.     Told him I can't tell on my end he can contact his pharmacy and let him know we are in clinic today it might be a few hours.

## 2024-04-23 DIAGNOSIS — G62.9 NEUROPATHY: ICD-10-CM

## 2024-04-23 RX ORDER — GABAPENTIN 300 MG/1
300 CAPSULE ORAL 3 TIMES DAILY
Qty: 60 CAPSULE | Refills: 5 | Status: SHIPPED | OUTPATIENT
Start: 2024-04-23 | End: 2024-10-20

## 2024-04-23 NOTE — TELEPHONE ENCOUNTER
Gabapentin was sent on 04/05/2024 but for 180 days with only a quantity of 60 capsules and 5 refills. Sent a perfect serve to  to clarify the prescription and see if he wants to change the dose.

## 2024-04-23 NOTE — TELEPHONE ENCOUNTER
Unable to reach patient but left voice message with recommendation that he follow-up with his PCP for leg pain.  He has been prescribed Gabapentin and apparently being referred to Neurology.

## 2024-04-23 NOTE — TELEPHONE ENCOUNTER
resent the prescription for TID. I tried to call the patient but was unable to leave a message due to no name on the voicemail.

## 2024-04-26 ENCOUNTER — TELEPHONE (OUTPATIENT)
Facility: CLINIC | Age: 59
End: 2024-04-26

## 2024-04-26 NOTE — TELEPHONE ENCOUNTER
Patient is requesting a refill on Tramdol 50 mg. He stated that he is in pain and this is the only thing that helps with his neuropathy. Send to [facility]

## 2024-04-30 ENCOUNTER — TELEPHONE (OUTPATIENT)
Age: 59
End: 2024-04-30

## 2024-04-30 NOTE — TELEPHONE ENCOUNTER
04//30/24- Patient called in wanting a nurse to call him. He's excruciating pain and needs something more \"Powerful\". He's also coming tomorrow to bring in paperwork. He states it hurts really bad when he walks and he walks really slow- Thanks  DCR

## 2024-04-30 NOTE — TELEPHONE ENCOUNTER
I called and spoke with Checo Dey 2 patient identifiers used. Patient states he is in excruciating pain and needs Tramadol. I looked over 's last office note. I asked the patient if he has been wearing his compression stockings. He states that he has not that they hurt. I told him that he has to wear them and give them time to work. He also states that he is going to run out of Gabapentin. I informed him that there was refills on the medication.  stated last week that he needs to try this and give it time to work. The patient states that nothing helps but pain medication. I told the patient I would send  a message and see what he would like to do. Perfect Serve sent to .

## 2024-05-01 ENCOUNTER — TELEPHONE (OUTPATIENT)
Age: 59
End: 2024-05-01

## 2024-05-01 NOTE — TELEPHONE ENCOUNTER
sent a perfect serve back and needs to see the patient in the office this week to make sure there is no blood clot. I called and spoke with Checo Dey 2 patient identifiers used. Patient is scheduled to come in on Friday 05/03/24 at 830AM.

## 2024-05-01 NOTE — TELEPHONE ENCOUNTER
Checo Dey called stating his PCP prescribed an arthritis cream but it doesn't work that good. He would like more of the Lidocaine cream but wants to know if he can get 3-4 as the tubes are so small and he's almost out. He wanted to know if he could get Lidocaine 5% patches if possible instead. Pt states pain scale of 9 today.  Pharmacy [facility]. Pt's call back [phone].

## 2024-05-03 ENCOUNTER — OFFICE VISIT (OUTPATIENT)
Age: 59
End: 2024-05-03
Payer: MEDICAID

## 2024-05-03 ENCOUNTER — TELEPHONE (OUTPATIENT)
Age: 59
End: 2024-05-03

## 2024-05-03 VITALS
TEMPERATURE: 98.7 F | HEIGHT: 67 IN | OXYGEN SATURATION: 94 % | WEIGHT: 168 LBS | DIASTOLIC BLOOD PRESSURE: 86 MMHG | BODY MASS INDEX: 26.37 KG/M2 | HEART RATE: 88 BPM | SYSTOLIC BLOOD PRESSURE: 127 MMHG | RESPIRATION RATE: 18 BRPM

## 2024-05-03 DIAGNOSIS — G62.9 NEUROPATHY: ICD-10-CM

## 2024-05-03 DIAGNOSIS — R09.89 SUSPECTED DEEP VEIN THROMBOSIS (DVT): Primary | ICD-10-CM

## 2024-05-03 DIAGNOSIS — I87.2 VENOUS STASIS DERMATITIS OF BOTH LOWER EXTREMITIES: ICD-10-CM

## 2024-05-03 PROCEDURE — 99212 OFFICE O/P EST SF 10 MIN: CPT | Performed by: SURGERY

## 2024-05-03 ASSESSMENT — PATIENT HEALTH QUESTIONNAIRE - PHQ9
1. LITTLE INTEREST OR PLEASURE IN DOING THINGS: NOT AT ALL
SUM OF ALL RESPONSES TO PHQ QUESTIONS 1-9: 0
2. FEELING DOWN, DEPRESSED OR HOPELESS: NOT AT ALL
SUM OF ALL RESPONSES TO PHQ9 QUESTIONS 1 & 2: 0

## 2024-05-03 NOTE — PROGRESS NOTES
Identified pt with two pt identifiers (name and ). Reviewed chart in preparation for visit and have obtained necessary documentation.    Checo Dey is a 58 y.o. male  Chief Complaint   Patient presents with    Follow-up     Follow up bilateral leg pain and swelling     /86 (Site: Left Upper Arm, Position: Sitting, Cuff Size: Small Adult)   Pulse 88   Temp 98.7 °F (37.1 °C) (Oral)   Resp 18   Ht 1.702 m (5' 7\")   Wt 76.2 kg (168 lb)   SpO2 94%   BMI 26.31 kg/m²     1. Have you been to the ER, urgent care clinic since your last visit?  Hospitalized since your last visit?yes - [facility]    2. Have you seen or consulted any other health care providers outside of the [facility] since your last visit?  Include any pap smears or colon screening. no

## 2024-05-03 NOTE — TELEPHONE ENCOUNTER
Patient identified with two patient identifiers. Patient states he is requesting script for Neurontin sent to [facility] pharmacy on file. Patient informed prescription was had already been sent 4/23/24 with 5 refills.  Patient advised to call pharmacy and follow up with us if needed.  Patient expressed understanding.

## 2024-05-12 ENCOUNTER — HOSPITAL ENCOUNTER (EMERGENCY)
Facility: HOSPITAL | Age: 59
Discharge: HOME | End: 2024-05-12
Attending: STUDENT IN AN ORGANIZED HEALTH CARE EDUCATION/TRAINING PROGRAM
Payer: MEDICAID

## 2024-05-12 VITALS
DIASTOLIC BLOOD PRESSURE: 85 MMHG | WEIGHT: 165 LBS | HEART RATE: 95 BPM | TEMPERATURE: 98.2 F | BODY MASS INDEX: 25.9 KG/M2 | RESPIRATION RATE: 18 BRPM | HEIGHT: 67 IN | OXYGEN SATURATION: 96 % | SYSTOLIC BLOOD PRESSURE: 141 MMHG

## 2024-05-12 DIAGNOSIS — M79.671 PAIN IN BOTH FEET: Primary | ICD-10-CM

## 2024-05-12 DIAGNOSIS — M79.672 PAIN IN BOTH FEET: Primary | ICD-10-CM

## 2024-05-12 PROCEDURE — 6360000002 HC RX W HCPCS: Performed by: STUDENT IN AN ORGANIZED HEALTH CARE EDUCATION/TRAINING PROGRAM

## 2024-05-12 PROCEDURE — 99284 EMERGENCY DEPT VISIT MOD MDM: CPT

## 2024-05-12 PROCEDURE — 6370000000 HC RX 637 (ALT 250 FOR IP): Performed by: STUDENT IN AN ORGANIZED HEALTH CARE EDUCATION/TRAINING PROGRAM

## 2024-05-12 PROCEDURE — 96372 THER/PROPH/DIAG INJ SC/IM: CPT

## 2024-05-12 RX ORDER — KETOROLAC TROMETHAMINE 30 MG/ML
30 INJECTION, SOLUTION INTRAMUSCULAR; INTRAVENOUS
Status: COMPLETED | OUTPATIENT
Start: 2024-05-12 | End: 2024-05-12

## 2024-05-12 RX ORDER — GABAPENTIN 300 MG/1
300 CAPSULE ORAL ONCE
Status: COMPLETED | OUTPATIENT
Start: 2024-05-12 | End: 2024-05-12

## 2024-05-12 RX ADMIN — GABAPENTIN 300 MG: 300 CAPSULE ORAL at 16:59

## 2024-05-12 RX ADMIN — KETOROLAC TROMETHAMINE 30 MG: 30 INJECTION, SOLUTION INTRAMUSCULAR; INTRAVENOUS at 17:06

## 2024-05-12 ASSESSMENT — PAIN SCALES - GENERAL
PAINLEVEL_OUTOF10: 10
PAINLEVEL_OUTOF10: 10

## 2024-05-12 ASSESSMENT — PAIN - FUNCTIONAL ASSESSMENT
PAIN_FUNCTIONAL_ASSESSMENT: 0-10
PAIN_FUNCTIONAL_ASSESSMENT: ACTIVITIES ARE NOT PREVENTED
PAIN_FUNCTIONAL_ASSESSMENT: ACTIVITIES ARE NOT PREVENTED

## 2024-05-12 ASSESSMENT — PAIN DESCRIPTION - DESCRIPTORS
DESCRIPTORS: BURNING
DESCRIPTORS: BURNING

## 2024-05-12 ASSESSMENT — PAIN DESCRIPTION - ORIENTATION
ORIENTATION: RIGHT;LEFT
ORIENTATION: RIGHT;LEFT

## 2024-05-12 ASSESSMENT — PAIN DESCRIPTION - LOCATION
LOCATION: FOOT
LOCATION: FOOT

## 2024-05-12 NOTE — ED NOTES
Patient ambulatory outside per himself. Went outside to give patient discharge instructions and he requested a Medicaid cab. Patient sitting outside of ED in wheelchair.

## 2024-05-13 ENCOUNTER — TELEPHONE (OUTPATIENT)
Age: 59
End: 2024-05-13

## 2024-05-13 NOTE — TELEPHONE ENCOUNTER
Spoke with patient and asked if he is using the compression stockings he stated that he does not like them because they hurt but he has been using some other type of compression socks you wrap around your foot as tight or loose as you want that rodney gave him. Also asked about his pain and the Toradol injection he received yesterday he stated the gabapentin works like an asprin and he wants something stronger like a longer supply of Toradol. Informed him we can not give him any more Toradol and that he has to try and give the gabapentin a chance to work and to utilize the compression stockings he is suppose to be wearing the more he wears them the more comfortable they can become but I will message Rodney and if he does want to try and send something else Ii will call him back.

## 2024-05-13 NOTE — TELEPHONE ENCOUNTER
Checo Dey called stating he only has 1 box of lidocaine patches and only received small tubes of Lidocaine cream and it doesn't last him long. He stated none of the pain goes away whether he uses it or not. Pt's call back is [phone].

## 2024-05-13 NOTE — TELEPHONE ENCOUNTER
Patient called today regarding pain. He says that he is in a great deal of pain and that he is taking too much gabapentin. He would like some toradol called into [facility] pharmacy. [phone]

## 2024-05-16 NOTE — TELEPHONE ENCOUNTER
Spoke with patient and advised that Dr Kauffman hasn't seen him and she didn't prescribe this medication therefore he would have to reach out to the provider that had prescribed it. And if he was in a lot of pain he may need to go to the ER and he stated he goes there and they do nothing.

## 2024-05-20 ENCOUNTER — TELEPHONE (OUTPATIENT)
Age: 59
End: 2024-05-20

## 2024-05-20 ENCOUNTER — TELEPHONE (OUTPATIENT)
Facility: CLINIC | Age: 59
End: 2024-05-20

## 2024-05-20 NOTE — TELEPHONE ENCOUNTER
Patient called and stated he is in severe pain from neuropathy. He would like a stronger dose of gabapentin. He can hardly walk. Send to [facility]

## 2024-05-21 RX ORDER — IBUPROFEN 800 MG/1
800 TABLET ORAL
Qty: 90 TABLET | Refills: 5 | Status: SHIPPED | OUTPATIENT
Start: 2024-05-21

## 2024-05-21 NOTE — TELEPHONE ENCOUNTER
Tried to call Checo Dey unable to leave a message due to no name on the voicemail.  sent in Ibuprofen 800mg. He would like to see if the patient is willing to see podiatry for ankle pain. He also needs to make an appointment with Neurology.

## 2024-05-22 ENCOUNTER — TELEPHONE (OUTPATIENT)
Age: 59
End: 2024-05-22

## 2024-05-22 NOTE — TELEPHONE ENCOUNTER
Patient called in requesting lidocaine 4% to be sent to [facility] in [place]. Please contact and assist. Thanks DCR

## 2024-05-22 NOTE — TELEPHONE ENCOUNTER
Patient was given prescription on 05/03/2024 for Lidocaine patches with 1 refill. He will need to call the pharmacy to have this refilled. I tried to call the patient back but no name on voicemail.

## 2024-05-22 NOTE — TELEPHONE ENCOUNTER
I called and spoke with Checo Dey 2 patient identifiers used. I informed him that  did send him in Ibuprofen 800mg to [facility] in [place]. I also reminded him to call and make his appointment with Neurology. He states he is going to work on that today.

## 2024-05-23 ENCOUNTER — TELEPHONE (OUTPATIENT)
Age: 59
End: 2024-05-23

## 2024-05-23 NOTE — TELEPHONE ENCOUNTER
Checo Dey called stating he needs bigger tubes of Lidocaine cream 4%. He checked with [facility] but they dont have a request for it. Pt call back [phone].   Called pt back to advise he needs to follow up with PCP per Dr. Redd. Left .

## 2024-05-23 NOTE — TELEPHONE ENCOUNTER
Tried to call the patient back. Spoke with  that is not something he prescribes he will need to call his PCP who has been prescribing it.

## 2024-05-23 NOTE — TELEPHONE ENCOUNTER
Patient calling stating he needs 4 Lidocaine cream tubes , not patches... He states he has to place the cream in numerous places . He also states he's in terrible pain. This helps but does not take the pain away. Please call to assist. Thanks DCR

## 2024-06-04 ENCOUNTER — TELEPHONE (OUTPATIENT)
Age: 59
End: 2024-06-04

## 2024-06-04 NOTE — TELEPHONE ENCOUNTER
Patient called in with severe pain in the leg . Calling in to see if he can get some pain meds.  Pt states pain never goes away... Please contact pt to assist. Thanks DCR

## 2024-06-05 ENCOUNTER — TELEPHONE (OUTPATIENT)
Age: 59
End: 2024-06-05

## 2024-06-05 DIAGNOSIS — G62.9 NEUROPATHY: Primary | ICD-10-CM

## 2024-06-05 DIAGNOSIS — M79.604 LEG PAIN, BILATERAL: ICD-10-CM

## 2024-06-05 DIAGNOSIS — M79.605 LEG PAIN, BILATERAL: ICD-10-CM

## 2024-06-05 NOTE — TELEPHONE ENCOUNTER
Patient called stating that he is in EXTREME pain. I informed him that he had refills on his Ibuprofen that  had stated in the past he couldn't prescribe any narcotics and if he wanted to go to Pain Management we could put in a referral. I also reminded the patient to make an appointment with Dr.Stacey Chamberlain who will see him for his Neuropathy. He states that he's laying in the bed with all his medication and cannot go anywhere. I provided him the number again to Melvina Chamberlain. I told him that he is supposed to see  back in August but if the pain is that bad I recommend going to the ER. He states the ER is a joke because they only gave him one Tramadol and he's not going back. I told him I would send a message to  to see if we can refer him to Pain Management but to also call and make an appointment with the Neurologist.

## 2024-06-06 NOTE — TELEPHONE ENCOUNTER
Spoke with  he would like to put in a referral for pain management and fax notes and referral. I put pain management referral in and faxed. Left upfront for the  to scan in the system.

## 2024-06-07 ENCOUNTER — TELEPHONE (OUTPATIENT)
Age: 59
End: 2024-06-07

## 2024-06-07 NOTE — TELEPHONE ENCOUNTER
Patient has already been made aware that we can not send anything for pain, no answer when calling to inform him again. Pain management referral has been sent as well.

## 2024-06-07 NOTE — TELEPHONE ENCOUNTER
Patient called asking for nurse to call him please.  He's in a lot of pain. Patient is asking for pain medicine please. Thanks -DCR

## 2024-06-10 ENCOUNTER — TELEPHONE (OUTPATIENT)
Age: 59
End: 2024-06-10

## 2024-06-10 NOTE — TELEPHONE ENCOUNTER
Patient called asking for more pain medication. I spoke with Checo Dey 2 patient identifiers used I informed him that we did refer him to Pain Management and asked if he would like their number to call. He states he has multiple numbers to pain management and he will give someone a call. He states he got no where with this call he just needs pain medication. I informed him that I was sorry but thet  referred him out.

## 2024-06-24 ENCOUNTER — TELEPHONE (OUTPATIENT)
Facility: CLINIC | Age: 59
End: 2024-06-24

## 2024-06-24 NOTE — TELEPHONE ENCOUNTER
Patient called requesting a refill for the following medication:      lidocaine (LMX) 4 % cream       Patient wants 4 or 5 tubes of this medication for his neuropathy.  He called Dr. Redd office and he was told to call his PCP for any refills         Please send to [facility] in [place], [place]

## 2024-06-26 ENCOUNTER — TELEPHONE (OUTPATIENT)
Facility: CLINIC | Age: 59
End: 2024-06-26

## 2024-06-26 DIAGNOSIS — M79.605 BILATERAL LOWER EXTREMITY PAIN: Primary | ICD-10-CM

## 2024-06-26 DIAGNOSIS — M79.604 BILATERAL LOWER EXTREMITY PAIN: Primary | ICD-10-CM

## 2024-06-26 RX ORDER — LIDOCAINE 40 MG/G
CREAM TOPICAL
Qty: 28 G | Refills: 0 | Status: SHIPPED | OUTPATIENT
Start: 2024-06-26

## 2024-06-26 NOTE — TELEPHONE ENCOUNTER
Patient called requesting a refill for the following medication:        lidocaine (LMX) 4 % cream         Patient wants 4 or 5 tubes of this medication for his neuropathy.  He called Dr. Redd office and he was told to call his PCP for any refills            Please send to [facility] in [place], [place]    Second call in for this prescription

## 2024-06-26 NOTE — TELEPHONE ENCOUNTER
I called Dr. Jose office and let them know that this medication was prescribed by  and not you when he was seen at a hospital follow up for cellulitis.  let them know I was not sure if you would refill this medication for the patient because he is the one that prescribed the medication.

## 2024-06-28 DIAGNOSIS — M79.605 BILATERAL LOWER EXTREMITY PAIN: ICD-10-CM

## 2024-06-28 DIAGNOSIS — K21.9 GASTROESOPHAGEAL REFLUX DISEASE, UNSPECIFIED WHETHER ESOPHAGITIS PRESENT: ICD-10-CM

## 2024-06-28 DIAGNOSIS — M79.604 BILATERAL LOWER EXTREMITY PAIN: ICD-10-CM

## 2024-07-01 RX ORDER — DICLOFENAC SODIUM 10 MG/G
GEL TOPICAL
Qty: 150 G | Refills: 0 | Status: SHIPPED | OUTPATIENT
Start: 2024-07-01

## 2024-07-01 RX ORDER — PANTOPRAZOLE SODIUM 40 MG/1
40 TABLET, DELAYED RELEASE ORAL DAILY
Qty: 90 TABLET | Refills: 1 | Status: SHIPPED | OUTPATIENT
Start: 2024-07-01

## 2024-07-17 ENCOUNTER — TELEPHONE (OUTPATIENT)
Age: 59
End: 2024-07-17

## 2024-07-17 ENCOUNTER — TELEPHONE (OUTPATIENT)
Facility: CLINIC | Age: 59
End: 2024-07-17

## 2024-07-17 NOTE — TELEPHONE ENCOUNTER
's office called me back and states that they have Checo Dey scheduled for 10/11/2024 @ 11:30 AM for his first pain management visit. She states that if he doesn't make this appointment they will not reschedule.     I called and spoke with Checo Dey 2 patient identifiers used. I made him aware that he does have an appointment with pain management and gave him that date. He states that it is ridiculous that no one will prescribe him pain medication. I apologized to him but told him that this was a start and he still needed to go the Neurologist to make an appointment.       Perfect Serve was sent to  to make him aware.

## 2024-07-17 NOTE — TELEPHONE ENCOUNTER
Patient called he is requesting a refill on his Gabapentin he has run out the dose that he is taking 300mg he is also asking for a higher dose.Please advise. Call back number is [phone].

## 2024-07-17 NOTE — TELEPHONE ENCOUNTER
I called and spoke with Checo Dey 2 patient identifiers used. I explained to the patient that  referred him to pain pain management so he will need to call them to make an appointment. I also informed him that I can see that  the neurologist has also tried to reach out to him and schedule. He states that he is unable to move at this time so that's why he hasn't been. I asked him if he would like for me to provide him with their numbers but he states he is out and about and will call us later for that number.     (Neurology)Dr.Stacey Chamberlain number is [phone]  (Pain Management)  [phone]     read over the Ortho note in the system and as stated before he needs to follow up with pain management and neurology .

## 2024-07-18 ENCOUNTER — TELEPHONE (OUTPATIENT)
Facility: CLINIC | Age: 59
End: 2024-07-18

## 2024-07-18 NOTE — TELEPHONE ENCOUNTER
The Patient called and stated that he needs a refill on gabapentin 300mg. He states that Dr. Stevens prescribed him this medication for neuropathy but will not give him a refill on this medication because he was referred to pain management. He states that he would like a higher dose of the Gabapentin because the 300mg is not helping his neuropathy. I have looked at Dr. Stevens's nurses notes and it states that the patient does have an appointment with pain management in October and the notes also states that Dr. Chamberlain office has been trying to reach the patient to get scheduled. I let the patient know that he needs to call Dr. Chamberlain office and get scheduled. He states that he can barley move out of bed much less go to appointments. I let him know that it is important for him to go to to his appointments so that he can be seen and the right medical care can be provided. The patient was laughing on the phone and telling me that I was not understanding what he was saying and when he had to find something to write the providers number down he stated that he had to get out of he bed which he can barley do and that he was urinating all over himself.

## 2024-07-26 ENCOUNTER — TELEPHONE (OUTPATIENT)
Facility: CLINIC | Age: 59
End: 2024-07-26

## 2024-07-26 NOTE — TELEPHONE ENCOUNTER
The patient called back and wanted a refill gabapentin and lidocaine cream. He did not go to the ER like you advised him. He keeps saying that Dr. Stevens will no longer prescribe him the medication and he states that he needs the gabapentin. I told the patient that he needs to be seen at the ER as the provider advised and then to call us back after he is seen so that we can see what his labs and any test that is done can be looked at.

## 2024-08-06 ENCOUNTER — TELEPHONE (OUTPATIENT)
Facility: CLINIC | Age: 59
End: 2024-08-06

## 2024-08-06 NOTE — TELEPHONE ENCOUNTER
Dominique [phone]  Ext [phone] the care coordinator from [facility] called and wanted to know if the patient can be ordered shower chair, walker, and a cane. Can he be scheduled for a virtual appointment to mention these DME supplies because they are not mentioned in the April visit.

## 2024-08-12 NOTE — TELEPHONE ENCOUNTER
I called to let the patient know that he needs to be seen by an appointment to discuss DME supplies

## 2024-08-13 ENCOUNTER — OFFICE VISIT (OUTPATIENT)
Facility: CLINIC | Age: 59
End: 2024-08-13
Payer: MEDICAID

## 2024-08-13 VITALS
HEART RATE: 92 BPM | SYSTOLIC BLOOD PRESSURE: 157 MMHG | TEMPERATURE: 98.8 F | RESPIRATION RATE: 18 BRPM | DIASTOLIC BLOOD PRESSURE: 95 MMHG | WEIGHT: 192 LBS | BODY MASS INDEX: 30.13 KG/M2 | OXYGEN SATURATION: 99 % | HEIGHT: 67 IN

## 2024-08-13 DIAGNOSIS — M79.89 SWELLING OF LOWER EXTREMITY: Primary | ICD-10-CM

## 2024-08-13 DIAGNOSIS — Z12.5 SCREENING FOR PROSTATE CANCER: ICD-10-CM

## 2024-08-13 DIAGNOSIS — R35.0 URINARY FREQUENCY: ICD-10-CM

## 2024-08-13 DIAGNOSIS — I10 PRIMARY HYPERTENSION: ICD-10-CM

## 2024-08-13 DIAGNOSIS — M79.89 SWELLING OF LOWER EXTREMITY: ICD-10-CM

## 2024-08-13 DIAGNOSIS — Z13.220 SCREENING FOR HYPERLIPIDEMIA: ICD-10-CM

## 2024-08-13 DIAGNOSIS — D64.9 ANEMIA, UNSPECIFIED TYPE: ICD-10-CM

## 2024-08-13 DIAGNOSIS — M79.604 CHRONIC PAIN OF BOTH LOWER EXTREMITIES: ICD-10-CM

## 2024-08-13 DIAGNOSIS — G62.9 NEUROPATHY: ICD-10-CM

## 2024-08-13 DIAGNOSIS — G89.29 CHRONIC PAIN OF BOTH LOWER EXTREMITIES: ICD-10-CM

## 2024-08-13 DIAGNOSIS — M79.605 CHRONIC PAIN OF BOTH LOWER EXTREMITIES: ICD-10-CM

## 2024-08-13 PROCEDURE — 3077F SYST BP >= 140 MM HG: CPT | Performed by: STUDENT IN AN ORGANIZED HEALTH CARE EDUCATION/TRAINING PROGRAM

## 2024-08-13 PROCEDURE — 99214 OFFICE O/P EST MOD 30 MIN: CPT | Performed by: STUDENT IN AN ORGANIZED HEALTH CARE EDUCATION/TRAINING PROGRAM

## 2024-08-13 PROCEDURE — 3080F DIAST BP >= 90 MM HG: CPT | Performed by: STUDENT IN AN ORGANIZED HEALTH CARE EDUCATION/TRAINING PROGRAM

## 2024-08-13 RX ORDER — GABAPENTIN 600 MG/1
600 TABLET ORAL EVERY MORNING
Qty: 30 TABLET | Refills: 2 | Status: SHIPPED | OUTPATIENT
Start: 2024-08-13 | End: 2024-11-11

## 2024-08-13 RX ORDER — ACETAMINOPHEN 325 MG/1
TABLET ORAL
COMMUNITY
Start: 2022-04-27

## 2024-08-13 RX ORDER — FUROSEMIDE 20 MG/1
20 TABLET ORAL DAILY PRN
Qty: 30 TABLET | Refills: 2 | Status: SHIPPED | OUTPATIENT
Start: 2024-08-13

## 2024-08-13 NOTE — PROGRESS NOTES
Checo Dey (: 1965) is a 59 y.o. male, Established patient patient, here for evaluation of the following chief complaint(s):  Follow-up Chronic Condition       ASSESSMENT/PLAN:  Below is the assessment and plan developed based on review of pertinent history, physical exam, labs, studies, and medications.    1. Swelling of lower extremity  -     furosemide (LASIX) 20 MG tablet; Take 1 tablet by mouth daily as needed (swelling of legs), Disp-30 tablet, R-2Normal  -     Comprehensive Metabolic Panel; Future  -     Vascular duplex lower extremity venous bilateral; Future  2. Urinary frequency  -     UA W/Microscopic, Rfx to Culture ([facility] Default); Future  3. Neuropathy  -     gabapentin (NEURONTIN) 600 MG tablet; Take 1 tablet by mouth every morning for 90 days. Max Daily Amount: 600 mg, Disp-30 tablet, R-2Normal  -     AFL - Brendan Gunderson MD, Neurology, [place]  -     Vitamin B12; Future  -     T4, Free; Future  -     TSH; Future  -     Hemoglobin A1C; Future  4. Anemia, unspecified type  -     CBC; Future  -     Ferritin; Future  -     Folate; Future  -     Iron and TIBC; Future  -     Comprehensive Metabolic Panel; Future  5. Screening for hyperlipidemia  -     Lipid Panel; Future  6. Chronic pain of both lower extremities  -     Lidocaine 4 % OINT; Apply 50 g topically in the morning and at bedtime Twice daily, Disp-100 g, R-2Normal  7. Screening for prostate cancer  -     PSA Screening; Future  8. Primary hypertension    He has neuropathy, not sure exact etiology could be alcohol induced.  Reordered blood work as he had not gotten it done during last visit.  He is on gabapentin, increase dose to 600 mg in the a.m., 300 mg in the afternoon and at night.      Hx of alcohol use disorder, states he hasn't drank in the last 2 weeks   Fatty liver on US in .    He has a referral to GI, printout of referral given.  He is asking for shower chair, walker and cane with 4 prongs.  He has

## 2024-08-13 NOTE — PROGRESS NOTES
\"Have you been to the ER, urgent care clinic since your last visit?  Hospitalized since your last visit?\"    NO    “Have you seen or consulted any other health care providers outside of [facility] since your last visit?”    NO        “Have you had a colorectal cancer screening such as a colonoscopy/FIT/Cologuard?    NO    No colonoscopy on file  No cologuard on file  No FIT/FOBT on file   No flexible sigmoidoscopy on file     Chief Complaint   Patient presents with    Follow-up Chronic Condition     BP (!) 159/92 (Site: Left Upper Arm, Position: Sitting, Cuff Size: Medium Adult)   Pulse 97   Temp 98.8 °F (37.1 °C) (Oral)   Resp 18   Ht 1.702 m (5' 7\")   Wt 87.1 kg (192 lb)   SpO2 99%   BMI 30.07 kg/m²       Click Here for Release of Records Request

## 2024-08-14 ENCOUNTER — TELEPHONE (OUTPATIENT)
Facility: CLINIC | Age: 59
End: 2024-08-14

## 2024-08-14 DIAGNOSIS — R74.8 ELEVATED ALKALINE PHOSPHATASE LEVEL: Primary | ICD-10-CM

## 2024-08-14 DIAGNOSIS — E53.8 B12 DEFICIENCY: ICD-10-CM

## 2024-08-14 DIAGNOSIS — E87.5 HYPERKALEMIA: ICD-10-CM

## 2024-08-14 LAB
ALBUMIN SERPL-MCNC: 3.7 G/DL (ref 3.9–4.9)
ALP SERPL-CCNC: 194 IU/L (ref 44–121)
ALT SERPL-CCNC: 12 IU/L (ref 0–44)
APPEARANCE UR: CLEAR
AST SERPL-CCNC: 25 IU/L (ref 0–40)
BACTERIA #/AREA URNS HPF: NORMAL /[HPF]
BILIRUB SERPL-MCNC: 0.3 MG/DL (ref 0–1.2)
BILIRUB UR QL STRIP: NEGATIVE
BUN SERPL-MCNC: 7 MG/DL (ref 8–27)
BUN/CREAT SERPL: 14 (ref 10–24)
CALCIUM SERPL-MCNC: 8.9 MG/DL (ref 8.6–10.2)
CASTS URNS QL MICRO: NORMAL /LPF
CHLORIDE SERPL-SCNC: 104 MMOL/L (ref 96–106)
CHOLEST SERPL-MCNC: 174 MG/DL (ref 100–199)
CO2 SERPL-SCNC: 24 MMOL/L (ref 20–29)
COLOR UR: YELLOW
CREAT SERPL-MCNC: 0.5 MG/DL (ref 0.76–1.27)
EGFRCR SERPLBLD CKD-EPI 2021: 116 ML/MIN/1.73
EPI CELLS #/AREA URNS HPF: NORMAL /HPF (ref 0–10)
ERYTHROCYTE [DISTWIDTH] IN BLOOD BY AUTOMATED COUNT: 16.9 % (ref 11.6–15.4)
FERRITIN SERPL-MCNC: 321 NG/ML (ref 30–400)
FOLATE SERPL-MCNC: 4.1 NG/ML
GLOBULIN SER CALC-MCNC: 2.1 G/DL (ref 1.5–4.5)
GLUCOSE SERPL-MCNC: 84 MG/DL (ref 70–99)
GLUCOSE UR QL STRIP: NEGATIVE
HBA1C MFR BLD: 4.7 % (ref 4.8–5.6)
HCT VFR BLD AUTO: 28.8 % (ref 37.5–51)
HDLC SERPL-MCNC: 56 MG/DL
HGB BLD-MCNC: 10.1 G/DL (ref 13–17.7)
HGB UR QL STRIP: NEGATIVE
IRON SATN MFR SERPL: 16 % (ref 15–55)
IRON SERPL-MCNC: 41 UG/DL (ref 38–169)
KETONES UR QL STRIP: NEGATIVE
LDLC SERPL CALC-MCNC: 102 MG/DL (ref 0–99)
LEUKOCYTE ESTERASE UR QL STRIP: NEGATIVE
MCH RBC QN AUTO: 37.5 PG (ref 26.6–33)
MCHC RBC AUTO-ENTMCNC: 35.1 G/DL (ref 31.5–35.7)
MCV RBC AUTO: 107 FL (ref 79–97)
MICRO URNS: NORMAL
MICRO URNS: NORMAL
NITRITE UR QL STRIP: NEGATIVE
PH UR STRIP: 7 [PH] (ref 5–7.5)
PLATELET # BLD AUTO: 362 X10E3/UL (ref 150–450)
POTASSIUM SERPL-SCNC: 5.5 MMOL/L (ref 3.5–5.2)
PROT SERPL-MCNC: 5.8 G/DL (ref 6–8.5)
PROT UR QL STRIP: NEGATIVE
PSA SERPL-MCNC: 1.4 NG/ML (ref 0–4)
RBC # BLD AUTO: 2.69 X10E6/UL (ref 4.14–5.8)
RBC #/AREA URNS HPF: NORMAL /HPF (ref 0–2)
SODIUM SERPL-SCNC: 141 MMOL/L (ref 134–144)
SP GR UR STRIP: 1.01 (ref 1–1.03)
T4 FREE SERPL-MCNC: 0.92 NG/DL (ref 0.82–1.77)
TIBC SERPL-MCNC: 260 UG/DL (ref 250–450)
TRIGL SERPL-MCNC: 85 MG/DL (ref 0–149)
TSH SERPL DL<=0.005 MIU/L-ACNC: 2.54 UIU/ML (ref 0.45–4.5)
UIBC SERPL-MCNC: 219 UG/DL (ref 111–343)
URINALYSIS REFLEX: NORMAL
UROBILINOGEN UR STRIP-MCNC: 0.2 MG/DL (ref 0.2–1)
VIT B12 SERPL-MCNC: 167 PG/ML (ref 232–1245)
VLDLC SERPL CALC-MCNC: 16 MG/DL (ref 5–40)
WBC # BLD AUTO: 6.9 X10E3/UL (ref 3.4–10.8)
WBC #/AREA URNS HPF: NORMAL /HPF (ref 0–5)

## 2024-08-14 NOTE — TELEPHONE ENCOUNTER
Patient called stated he picked up his prescriptions and was missing the lidocaine ointment.    I explained that all three were sent to [facility] pharmacy and we had confirmation they were received.    I also explained that it may be because the ointment may be OTC. He is going to check back with the pharmacy.

## 2024-08-15 ENCOUNTER — NURSE ONLY (OUTPATIENT)
Facility: CLINIC | Age: 59
End: 2024-08-15
Payer: MEDICAID

## 2024-08-15 DIAGNOSIS — G89.29 CHRONIC PAIN OF BOTH LOWER EXTREMITIES: ICD-10-CM

## 2024-08-15 DIAGNOSIS — M79.605 CHRONIC PAIN OF BOTH LOWER EXTREMITIES: ICD-10-CM

## 2024-08-15 DIAGNOSIS — E53.8 B12 DEFICIENCY: Primary | ICD-10-CM

## 2024-08-15 DIAGNOSIS — G62.9 NEUROPATHY: Primary | ICD-10-CM

## 2024-08-15 DIAGNOSIS — M79.604 CHRONIC PAIN OF BOTH LOWER EXTREMITIES: ICD-10-CM

## 2024-08-15 LAB
ANA SER QL IF: NEGATIVE
LABORATORY COMMENT REPORT: NORMAL

## 2024-08-15 PROCEDURE — 96372 THER/PROPH/DIAG INJ SC/IM: CPT | Performed by: STUDENT IN AN ORGANIZED HEALTH CARE EDUCATION/TRAINING PROGRAM

## 2024-08-15 RX ORDER — CYANOCOBALAMIN 1000 UG/ML
1000 INJECTION, SOLUTION INTRAMUSCULAR; SUBCUTANEOUS ONCE
Status: COMPLETED | OUTPATIENT
Start: 2024-08-15 | End: 2024-08-15

## 2024-08-15 RX ADMIN — CYANOCOBALAMIN 1000 MCG: 1000 INJECTION, SOLUTION INTRAMUSCULAR; SUBCUTANEOUS at 14:28

## 2024-08-16 ENCOUNTER — TELEPHONE (OUTPATIENT)
Facility: CLINIC | Age: 59
End: 2024-08-16

## 2024-08-17 RX ORDER — DICLOFENAC SODIUM 20 MG/G
2 SOLUTION TOPICAL DAILY PRN
Qty: 3.4 G | Refills: 3 | Status: SHIPPED | OUTPATIENT
Start: 2024-08-17

## 2024-08-20 ENCOUNTER — TRANSCRIBE ORDERS (OUTPATIENT)
Dept: SCHEDULING | Age: 59
End: 2024-08-20

## 2024-08-20 ENCOUNTER — NURSE ONLY (OUTPATIENT)
Facility: CLINIC | Age: 59
End: 2024-08-20
Payer: MEDICAID

## 2024-08-20 DIAGNOSIS — E53.8 B12 DEFICIENCY: Primary | ICD-10-CM

## 2024-08-20 DIAGNOSIS — R74.8 ELEVATED ALKALINE PHOSPHATASE LEVEL: Primary | ICD-10-CM

## 2024-08-20 PROCEDURE — 96372 THER/PROPH/DIAG INJ SC/IM: CPT | Performed by: STUDENT IN AN ORGANIZED HEALTH CARE EDUCATION/TRAINING PROGRAM

## 2024-08-20 RX ORDER — CYANOCOBALAMIN 1000 UG/ML
1000 INJECTION, SOLUTION INTRAMUSCULAR; SUBCUTANEOUS ONCE
Status: COMPLETED | OUTPATIENT
Start: 2024-08-20 | End: 2024-08-20

## 2024-08-20 RX ADMIN — CYANOCOBALAMIN 1000 MCG: 1000 INJECTION, SOLUTION INTRAMUSCULAR; SUBCUTANEOUS at 09:27

## 2024-08-20 NOTE — PROGRESS NOTES
After obtaining consent, and per orders of Dr. Kauffman, injection of Vitamin B-12 given by Georgette Nunez LPN. Patient instructed to remain in clinic for 20 minutes afterwards, and to report any adverse reaction to me immediately.

## 2024-08-21 DIAGNOSIS — E87.5 HYPERKALEMIA: ICD-10-CM

## 2024-08-23 ENCOUNTER — TELEPHONE (OUTPATIENT)
Facility: CLINIC | Age: 59
End: 2024-08-23

## 2024-08-23 NOTE — TELEPHONE ENCOUNTER
The patient called and stated that he wants to be referred to Physical Therapy. He says that he needs to be referred to physical for bilateral legs and feet due to his mobility. He would like to be referred to [facility] in [place].

## 2024-08-26 ENCOUNTER — TELEPHONE (OUTPATIENT)
Facility: CLINIC | Age: 59
End: 2024-08-26

## 2024-08-26 DIAGNOSIS — R29.898 BILATERAL LEG WEAKNESS: Primary | ICD-10-CM

## 2024-08-26 DIAGNOSIS — Z74.09 IMPAIRED PHYSICAL MOBILITY: ICD-10-CM

## 2024-08-26 DIAGNOSIS — M79.671 BILATERAL FOOT PAIN: ICD-10-CM

## 2024-08-26 DIAGNOSIS — M79.672 BILATERAL FOOT PAIN: ICD-10-CM

## 2024-08-27 ENCOUNTER — HOSPITAL ENCOUNTER (OUTPATIENT)
Facility: HOSPITAL | Age: 59
Discharge: HOME | End: 2024-08-30
Attending: STUDENT IN AN ORGANIZED HEALTH CARE EDUCATION/TRAINING PROGRAM
Payer: MEDICAID

## 2024-08-27 ENCOUNTER — NURSE ONLY (OUTPATIENT)
Facility: CLINIC | Age: 59
End: 2024-08-27
Payer: MEDICAID

## 2024-08-27 ENCOUNTER — HOSPITAL ENCOUNTER (OUTPATIENT)
Facility: HOSPITAL | Age: 59
Discharge: HOME | End: 2024-08-29
Attending: STUDENT IN AN ORGANIZED HEALTH CARE EDUCATION/TRAINING PROGRAM
Payer: MEDICAID

## 2024-08-27 DIAGNOSIS — R74.8 ELEVATED ALKALINE PHOSPHATASE LEVEL: ICD-10-CM

## 2024-08-27 DIAGNOSIS — M79.89 SWELLING OF LOWER EXTREMITY: ICD-10-CM

## 2024-08-27 DIAGNOSIS — E53.8 B12 DEFICIENCY: Primary | ICD-10-CM

## 2024-08-27 PROCEDURE — 99999 PR OFFICE/OUTPT VISIT,PROCEDURE ONLY: CPT | Performed by: STUDENT IN AN ORGANIZED HEALTH CARE EDUCATION/TRAINING PROGRAM

## 2024-08-27 PROCEDURE — 76705 ECHO EXAM OF ABDOMEN: CPT

## 2024-08-27 PROCEDURE — 96372 THER/PROPH/DIAG INJ SC/IM: CPT | Performed by: STUDENT IN AN ORGANIZED HEALTH CARE EDUCATION/TRAINING PROGRAM

## 2024-08-27 PROCEDURE — 93970 EXTREMITY STUDY: CPT

## 2024-08-27 RX ORDER — CYANOCOBALAMIN 1000 UG/ML
1000 INJECTION, SOLUTION INTRAMUSCULAR; SUBCUTANEOUS ONCE
Status: COMPLETED | OUTPATIENT
Start: 2024-08-27 | End: 2024-08-27

## 2024-08-27 RX ADMIN — CYANOCOBALAMIN 1000 MCG: 1000 INJECTION, SOLUTION INTRAMUSCULAR; SUBCUTANEOUS at 11:17

## 2024-08-27 NOTE — PROGRESS NOTES
After obtaining consent, and per orders of Dr. Kauffman, injection of Vitamin B12 given by Georgette Nunez LPN. Patient instructed to remain in clinic for 20 minutes afterwards, and to report any adverse reaction to me immediately.   Caro Martinez is a 32year 

## 2024-08-28 ENCOUNTER — TELEPHONE (OUTPATIENT)
Facility: CLINIC | Age: 59
End: 2024-08-28

## 2024-08-28 NOTE — TELEPHONE ENCOUNTER
Patient called in stating that she needed a new referral made because the one  she got at the office her insurance does not cover it but she has found a office that does and  [facility]. Address is [address] [place] [address] and has an appt for 9/3 just needs a new reffal made Candice Juarez  Fax number [phone]

## 2024-09-04 ENCOUNTER — NURSE ONLY (OUTPATIENT)
Facility: CLINIC | Age: 59
End: 2024-09-04
Payer: MEDICAID

## 2024-09-04 DIAGNOSIS — E53.8 B12 DEFICIENCY: Primary | ICD-10-CM

## 2024-09-04 PROCEDURE — 96372 THER/PROPH/DIAG INJ SC/IM: CPT | Performed by: STUDENT IN AN ORGANIZED HEALTH CARE EDUCATION/TRAINING PROGRAM

## 2024-09-04 RX ORDER — CYANOCOBALAMIN 1000 UG/ML
1000 INJECTION, SOLUTION INTRAMUSCULAR; SUBCUTANEOUS ONCE
Status: COMPLETED | OUTPATIENT
Start: 2024-09-04 | End: 2024-09-04

## 2024-09-04 RX ADMIN — CYANOCOBALAMIN 1000 MCG: 1000 INJECTION, SOLUTION INTRAMUSCULAR; SUBCUTANEOUS at 14:17

## 2024-09-04 NOTE — PROGRESS NOTES
After obtaining consent, and per orders of Dr. Kauffman, injection of Vitamin B12 given by Georgette Nunez LPN. Patient instructed to remain in clinic for 20 minutes afterwards, and to report any adverse reaction to me immediately.

## 2024-09-06 ENCOUNTER — TELEPHONE (OUTPATIENT)
Facility: CLINIC | Age: 59
End: 2024-09-06

## 2024-09-08 DIAGNOSIS — M79.605 BILATERAL LEG PAIN: ICD-10-CM

## 2024-09-08 DIAGNOSIS — M79.604 BILATERAL LEG PAIN: ICD-10-CM

## 2024-09-08 DIAGNOSIS — G62.9 NEUROPATHY: Primary | ICD-10-CM

## 2024-09-08 RX ORDER — ACETAMINOPHEN 500 MG
500 TABLET ORAL EVERY 6 HOURS PRN
Qty: 90 TABLET | Refills: 1 | Status: SHIPPED | OUTPATIENT
Start: 2024-09-08

## 2024-09-12 ENCOUNTER — NURSE ONLY (OUTPATIENT)
Facility: CLINIC | Age: 59
End: 2024-09-12

## 2024-09-12 DIAGNOSIS — E53.8 B12 DEFICIENCY: Primary | ICD-10-CM

## 2024-09-12 RX ORDER — CYANOCOBALAMIN 1000 UG/ML
1000 INJECTION, SOLUTION INTRAMUSCULAR; SUBCUTANEOUS ONCE
Status: COMPLETED | OUTPATIENT
Start: 2024-09-12 | End: 2024-09-12

## 2024-09-12 RX ADMIN — CYANOCOBALAMIN 1000 MCG: 1000 INJECTION, SOLUTION INTRAMUSCULAR; SUBCUTANEOUS at 11:50

## 2024-09-14 DIAGNOSIS — E53.8 B12 DEFICIENCY: ICD-10-CM

## 2024-09-18 ENCOUNTER — HOSPITAL ENCOUNTER (OUTPATIENT)
Facility: HOSPITAL | Age: 59
Setting detail: RECURRING SERIES
Discharge: HOME | End: 2024-09-21
Attending: STUDENT IN AN ORGANIZED HEALTH CARE EDUCATION/TRAINING PROGRAM
Payer: MEDICAID

## 2024-09-18 PROCEDURE — 97110 THERAPEUTIC EXERCISES: CPT | Performed by: PHYSICAL THERAPIST

## 2024-09-18 PROCEDURE — 97161 PT EVAL LOW COMPLEX 20 MIN: CPT | Performed by: PHYSICAL THERAPIST

## 2024-09-20 ENCOUNTER — TELEPHONE (OUTPATIENT)
Facility: CLINIC | Age: 59
End: 2024-09-20

## 2024-09-21 LAB
BUN SERPL-MCNC: 14 MG/DL (ref 8–27)
BUN/CREAT SERPL: 23 (ref 10–24)
CALCIUM SERPL-MCNC: 9.3 MG/DL (ref 8.6–10.2)
CHLORIDE SERPL-SCNC: 106 MMOL/L (ref 96–106)
CO2 SERPL-SCNC: 19 MMOL/L (ref 20–29)
CREAT SERPL-MCNC: 0.61 MG/DL (ref 0.76–1.27)
EGFRCR SERPLBLD CKD-EPI 2021: 109 ML/MIN/1.73
GLUCOSE SERPL-MCNC: 84 MG/DL (ref 70–99)
POTASSIUM SERPL-SCNC: 4.8 MMOL/L (ref 3.5–5.2)
SODIUM SERPL-SCNC: 142 MMOL/L (ref 134–144)
VIT B12 SERPL-MCNC: 441 PG/ML (ref 232–1245)

## 2024-09-23 DIAGNOSIS — M79.604 CHRONIC PAIN OF BOTH LOWER EXTREMITIES: Primary | ICD-10-CM

## 2024-09-23 DIAGNOSIS — M79.605 CHRONIC PAIN OF BOTH LOWER EXTREMITIES: Primary | ICD-10-CM

## 2024-09-23 DIAGNOSIS — G89.29 CHRONIC PAIN OF BOTH LOWER EXTREMITIES: Primary | ICD-10-CM

## 2024-09-24 ENCOUNTER — HOSPITAL ENCOUNTER (OUTPATIENT)
Facility: HOSPITAL | Age: 59
Setting detail: RECURRING SERIES
Discharge: HOME | End: 2024-09-27
Attending: STUDENT IN AN ORGANIZED HEALTH CARE EDUCATION/TRAINING PROGRAM
Payer: MEDICAID

## 2024-09-24 PROCEDURE — 97110 THERAPEUTIC EXERCISES: CPT

## 2024-09-24 PROCEDURE — 97112 NEUROMUSCULAR REEDUCATION: CPT

## 2024-09-27 ENCOUNTER — TELEPHONE (OUTPATIENT)
Facility: CLINIC | Age: 59
End: 2024-09-27

## 2024-09-27 NOTE — TELEPHONE ENCOUNTER
Patient called and stated that he has been doing research for his pain in his legs. He stated that he would like to get a prescription for     Ktoprosen or Bestaein    Please advise

## 2024-10-01 ENCOUNTER — HOSPITAL ENCOUNTER (OUTPATIENT)
Facility: HOSPITAL | Age: 59
Setting detail: RECURRING SERIES
Discharge: HOME | End: 2024-10-04
Attending: STUDENT IN AN ORGANIZED HEALTH CARE EDUCATION/TRAINING PROGRAM
Payer: MEDICAID

## 2024-10-01 PROCEDURE — 97112 NEUROMUSCULAR REEDUCATION: CPT | Performed by: PHYSICAL THERAPIST

## 2024-10-01 PROCEDURE — 97110 THERAPEUTIC EXERCISES: CPT | Performed by: PHYSICAL THERAPIST

## 2024-10-01 NOTE — PROGRESS NOTES
Chart review completed all necessary documentation appear to be present.  Identified patient with two patient identifiers (name and ). Reviewed chart in preparation for visit and have obtained necessary documentation.    Checo Dey is a 59 y.o. male  Chief Complaint   Patient presents with    New Patient     gallbladder     /83 (Site: Left Upper Arm, Position: Sitting, Cuff Size: Large Adult)   Pulse 80   Temp 98.4 °F (36.9 °C) (Oral)   Resp 18   Ht 1.702 m (5' 7\")   Wt 88.5 kg (195 lb 3.2 oz)   SpO2 96%   BMI 30.57 kg/m²     1. Have you been to the ER, urgent care clinic since your last visit?  Hospitalized since your last visit?no    2. Have you seen or consulted any other health care providers outside of the [facility] since your last visit?  Include any pap smears or colon screening. no

## 2024-10-01 NOTE — PROGRESS NOTES
if applicable:     55     Total Total         [x]  Patient Education billed concurrently with other procedures   [x] Review HEP    [] Progressed/Changed HEP, detail:    [] Other detail:         Other Objective/Functional Measures      Pain Level at end of session (0-10 scale): 8      Assessment   Pt tolerated session well. Increased instability when standing on foam, and uses nearby objects to maintain stability with gait. No complications with completion of TE. Increased pain after 4-5pm. Practicing fall recovery would be beneficial.   Patient will continue to benefit from skilled PT / OT services to modify and progress therapeutic interventions, analyze and address functional mobility deficits, analyze and address ROM deficits, and analyze and address strength deficits to address functional deficits and attain remaining goals.    Progress toward goals / Updated goals:  []  See Progress Note/Recertification    Short Term Goals: To be accomplished in 8 treatments.   Independent with HEP including balance, endurance, LE ROM   Improve speed of gait  to 1.0.  AD as needed.    Sit to Stand- 9+ reps in 30 seconds  Long Term Goals: To be accomplished in 20 treatments.  Single limb balance 5 seconds  Speed of gait > 1.0 m/s  Step over a shoe box without LOB.      PLAN  Yes  Continue plan of care  Re-Cert Due: 11/18/24  [x]  Upgrade activities as tolerated  []  Discharge due to:  []  Other:      April Ana Cristina Guillermo, PT       10/1/2024       12:35 PM

## 2024-10-08 ENCOUNTER — HOSPITAL ENCOUNTER (OUTPATIENT)
Facility: HOSPITAL | Age: 59
Setting detail: RECURRING SERIES
Discharge: HOME | End: 2024-10-11
Attending: STUDENT IN AN ORGANIZED HEALTH CARE EDUCATION/TRAINING PROGRAM
Payer: MEDICAID

## 2024-10-08 PROCEDURE — 97110 THERAPEUTIC EXERCISES: CPT

## 2024-10-08 PROCEDURE — 97112 NEUROMUSCULAR REEDUCATION: CPT

## 2024-10-08 NOTE — PROGRESS NOTES
PHYSICAL THERAPY - DAILY TREATMENT NOTE (updated 3/23)      Date: 10/8/2024          Patient Name:  Checo Dey :  1965   Medical   Diagnosis:  Bilateral leg weakness [R29.898]  Bilateral foot pain [M79.671, M79.672]  Impaired physical mobility [Z74.09] Treatment Diagnosis:  Bilateral leg weakness [R29.898]  Bilateral foot pain [M79.671, M79.672]  Impaired physical mobility [Z74.09]   Referral Source:  Ethel Kauffman MD Insurance:   Payor: [facility] MEDICAID / Plan: [facility] HEALTHKEEPERS PLUS / Product Type: *No Product type* /                     Patient  verified yes     Visit #   Current  / Total 4 8   Time   In / Out 845 930   Total Treatment Time 45   Total Timed Codes 38         SUBJECTIVE    Pain Level (0-10 scale): 7    Any medication changes, allergies to medications, adverse drug reactions, diagnosis change, or new procedure performed?: [x] No    [] Yes (see summary sheet for update)  Medications: Verified on Patient Summary List    Subjective functional status/changes:     \"Not sure if I did too much the other day, but my legs were really hurting yesterday. Took meds and put on my cream which has helped a little.\"    OBJECTIVE      Therapeutic Procedures:  Tx Min Billable or 1:1 Min (if diff from Tx Min) Procedure, Rationale, Specifics   10  94683 Therapeutic Exercise (timed):  increase ROM, strength, coordination, balance, and proprioception to improve patient's ability to progress to PLOF and address remaining functional goals. (see flow sheet as applicable)     Details if applicable:     28  20093 Neuromuscular Re-Education (timed):  improve balance, coordination, kinesthetic sense, posture, core stability and proprioception to improve patient's ability to develop conscious control of individual muscles and awareness of position of extremities in order to progress to PLOF and address remaining functional goals. (see flow sheet as applicable)     Details if

## 2024-10-09 ENCOUNTER — OFFICE VISIT (OUTPATIENT)
Age: 59
End: 2024-10-09

## 2024-10-09 VITALS
DIASTOLIC BLOOD PRESSURE: 83 MMHG | SYSTOLIC BLOOD PRESSURE: 130 MMHG | BODY MASS INDEX: 30.64 KG/M2 | HEART RATE: 80 BPM | WEIGHT: 195.2 LBS | HEIGHT: 67 IN | RESPIRATION RATE: 18 BRPM | OXYGEN SATURATION: 96 % | TEMPERATURE: 98.4 F

## 2024-10-09 DIAGNOSIS — R18.8 OTHER ASCITES: ICD-10-CM

## 2024-10-09 DIAGNOSIS — R16.0 ENLARGED LIVER: Primary | ICD-10-CM

## 2024-10-09 ASSESSMENT — PATIENT HEALTH QUESTIONNAIRE - PHQ9
1. LITTLE INTEREST OR PLEASURE IN DOING THINGS: NOT AT ALL
SUM OF ALL RESPONSES TO PHQ9 QUESTIONS 1 & 2: 1
SUM OF ALL RESPONSES TO PHQ QUESTIONS 1-9: 1
SUM OF ALL RESPONSES TO PHQ QUESTIONS 1-9: 1
2. FEELING DOWN, DEPRESSED OR HOPELESS: SEVERAL DAYS
SUM OF ALL RESPONSES TO PHQ QUESTIONS 1-9: 1
SUM OF ALL RESPONSES TO PHQ QUESTIONS 1-9: 1

## 2024-10-09 NOTE — PROGRESS NOTES
[facility]  Shirley Patton MD  [address] [place] [address], [place] [address] [phone]      Patient Name: Checo Dey (59 y.o., male)    Patient Address: [facility] [place] [facility] [address]    PCP: Ethel Kauffman MD     Patient contact numbers:  [unfilled] [unfilled]       Chief Complaint   Patient presents with    New Patient     gallbladder    Evaluation for diffuse gallbladder wall thickening    History of Present Illness    This is a 59-year-old male who presents for referral/evaluation for gallbladder wall thickening diffuse.  Per patient he was noted to have a high alkaline phosphatase during routine lab work.  As a result he was sent for a liver ultrasound which showed gallbladder wall thickening.  Patient denies ever having abdominal pain.  I double checked with patient if he has ever had abdominal pain specifically any right upper quadrant or epigastric pain and he denies this.  He denies nausea, vomiting, diarrhea, constipation, blood per rectum, melena, chest pain, shortness of breath, fever, chills, hematemesis.  He states he is a former alcoholic drank at least 5-6 cocktails along with vodka shots for more than 20 years.  He has not had any alcohol in the last 4 months.  He has not had any unexpected weight loss in fact he says he has had increased appetite and weight gain.        Past Medical History:   Diagnosis Date    Bilateral lower extremity edema     Gastrointestinal disorder     Hypertension    NEUROPATHY  GERD    Past Surgical History:   Procedure Laterality Date    APPENDECTOMY     OPEN APPENDECTOMY    Family History:  HTN    Social History     Tobacco Use    Smoking status: Every Day     Current packs/day: 0.50     Average packs/day: 0.5 packs/day for 30.5 years (15.3 ttl pk-yrs)     Types: Cigarettes     Start date: 4/9/1994    Smokeless tobacco: Never   Vaping Use    Vaping status: Never Used

## 2024-10-11 ENCOUNTER — CLINICAL DOCUMENTATION (OUTPATIENT)
Age: 59
End: 2024-10-11

## 2024-10-11 NOTE — PROGRESS NOTES
10/11/2024 1357: New pt referral received. Referred by Shirley Patton MD. Dx: Hepatomegaly. Records in [facility]. Routine appt

## 2024-10-15 ENCOUNTER — HOSPITAL ENCOUNTER (OUTPATIENT)
Facility: HOSPITAL | Age: 59
Setting detail: RECURRING SERIES
Discharge: HOME | End: 2024-10-18
Attending: STUDENT IN AN ORGANIZED HEALTH CARE EDUCATION/TRAINING PROGRAM
Payer: MEDICAID

## 2024-10-15 PROCEDURE — 97112 NEUROMUSCULAR REEDUCATION: CPT | Performed by: PHYSICAL THERAPIST

## 2024-10-15 PROCEDURE — 97110 THERAPEUTIC EXERCISES: CPT | Performed by: PHYSICAL THERAPIST

## 2024-10-15 NOTE — PROGRESS NOTES
PHYSICAL THERAPY - DAILY TREATMENT NOTE (updated 3/23)      Date: 10/15/2024          Patient Name:  Checo Dey :  1965   Medical   Diagnosis:  Bilateral leg weakness [R29.898]  Bilateral foot pain [M79.671, M79.672]  Impaired physical mobility [Z74.09] Treatment Diagnosis:  Bilateral leg weakness [R29.898]  Bilateral foot pain [M79.671, M79.672]  Impaired physical mobility [Z74.09]   Referral Source:  Ethel Kauffman MD Insurance:   Payor: [facility] MEDICAID / Plan: [facility] HEALTHKEEPERS PLUS / Product Type: *No Product type* /                     Patient  verified yes     Visit #   Current  / Total 5 8   Time   In / Out 1015 am  1100 am    Total Treatment Time 50   Total Timed Codes 45         SUBJECTIVE    Pain Level (0-10 scale): 7    Any medication changes, allergies to medications, adverse drug reactions, diagnosis change, or new procedure performed?: [x] No    [] Yes (see summary sheet for update)  Medications: Verified on Patient Summary List    Subjective functional status/changes:     Increased time on feet causes increase in peripheral neuropathy.      OBJECTIVE      Therapeutic Procedures:  Tx Min Billable or 1:1 Min (if diff from Tx Min) Procedure, Rationale, Specifics   15  51040 Therapeutic Exercise (timed):  increase ROM, strength, coordination, balance, and proprioception to improve patient's ability to progress to PLOF and address remaining functional goals. (see flow sheet as applicable)     Details if applicable:     30  97415 Neuromuscular Re-Education (timed):  improve balance, coordination, kinesthetic sense, posture, core stability and proprioception to improve patient's ability to develop conscious control of individual muscles and awareness of position of extremities in order to progress to PLOF and address remaining functional goals. (see flow sheet as applicable)     Details if applicable:           Details if applicable:           Details if

## 2024-10-22 ENCOUNTER — HOSPITAL ENCOUNTER (OUTPATIENT)
Facility: HOSPITAL | Age: 59
Setting detail: RECURRING SERIES
Discharge: HOME | End: 2024-10-25
Attending: STUDENT IN AN ORGANIZED HEALTH CARE EDUCATION/TRAINING PROGRAM
Payer: MEDICAID

## 2024-10-22 PROCEDURE — 97110 THERAPEUTIC EXERCISES: CPT | Performed by: PHYSICAL THERAPIST

## 2024-10-22 NOTE — PROGRESS NOTES
PHYSICAL THERAPY - DAILY TREATMENT NOTE (updated 3/23)      Date: 10/22/2024          Patient Name:  Chceo Dey :  1965   Medical   Diagnosis:  Bilateral leg weakness [R29.898]  Bilateral foot pain [M79.671, M79.672]  Impaired physical mobility [Z74.09] Treatment Diagnosis:  Bilateral leg weakness [R29.898]  Bilateral foot pain [M79.671, M79.672]  Impaired physical mobility [Z74.09]   Referral Source:  Ethel Kauffman MD Insurance:   Payor: [facility] MEDICAID / Plan: [facility]P [facility] HEALTHKEEPERS PLUS / Product Type: *No Product type* /                     Patient  verified yes     Visit #   Current  / Total 3 8   Time   In / Out 8 900 am    Total Treatment Time 60   Total Timed Codes 60         SUBJECTIVE    Pain Level (0-10 scale): 2    Any medication changes, allergies to medications, adverse drug reactions, diagnosis change, or new procedure performed?: [x] No    [] Yes (see summary sheet for update)  Medications: Verified on Patient Summary List    Subjective functional status/changes:     Always in a lot of pain.  I just push through it .     OBJECTIVE      Therapeutic Procedures:  Tx Min Billable or 1:1 Min (if diff from Tx Min) Procedure, Rationale, Specifics   30  74361 Therapeutic Exercise (timed):  increase ROM, strength, coordination, balance, and proprioception to improve patient's ability to progress to PLOF and address remaining functional goals. (see flow sheet as applicable)     Details if applicable:     25  74012 Neuromuscular Re-Education (timed):  improve balance, coordination, kinesthetic sense, posture, core stability and proprioception to improve patient's ability to develop conscious control of individual muscles and awareness of position of extremities in order to progress to PLOF and address remaining functional goals. (see flow sheet as applicable)     Details if applicable:           Details if applicable:           Details if applicable:            Details

## 2024-10-22 NOTE — PROGRESS NOTES
PHYSICAL THERAPY - DAILY TREATMENT NOTE (updated 3/23)      Date: 10/22/2024          Patient Name:  Checo Dey :  1965   Medical   Diagnosis:  Bilateral leg weakness [R29.898]  Bilateral foot pain [M79.671, M79.672]  Impaired physical mobility [Z74.09] Treatment Diagnosis:  Bilateral leg weakness [R29.898]  Bilateral foot pain [M79.671, M79.672]  Impaired physical mobility [Z74.09]   Referral Source:  Ethel Kauffman MD Insurance:   Payor: [facility] MEDICAID / Plan: [facility] HEALTHKEEPERS PLUS / Product Type: *No Product type* /                     Patient  verified yes     Visit #   Current  / Total 6 8   Time   In / Out 845 am  940 am    Total Treatment Time 50   Total Timed Codes 45         SUBJECTIVE    Pain Level (0-10 scale): 8    Any medication changes, allergies to medications, adverse drug reactions, diagnosis change, or new procedure performed?: [x] No    [] Yes (see summary sheet for update)  Medications: Verified on Patient Summary List    Subjective functional status/changes:     Increased time on feet causes increase in peripheral neuropathy.      OBJECTIVE      Therapeutic Procedures:  Tx Min Billable or 1:1 Min (if diff from Tx Min) Procedure, Rationale, Specifics   15  60938 Therapeutic Exercise (timed):  increase ROM, strength, coordination, balance, and proprioception to improve patient's ability to progress to PLOF and address remaining functional goals. (see flow sheet as applicable)     Details if applicable:     30  64681 Neuromuscular Re-Education (timed):  improve balance, coordination, kinesthetic sense, posture, core stability and proprioception to improve patient's ability to develop conscious control of individual muscles and awareness of position of extremities in order to progress to PLOF and address remaining functional goals. (see flow sheet as applicable)     Details if applicable:           Details if applicable:           Details if

## 2024-10-22 NOTE — PROGRESS NOTES
[facility]  [address], [address]  [place] [address]  Phone: [phone]    Fax: [phone]    PHYSICAL THERAPY PROGRESS NOTE  Patient Name:  Checo Dey :  1965   Treatment/Medical Diagnosis: Bilateral leg weakness [R29.898]  Bilateral foot pain [M79.671, M79.672]  Impaired physical mobility [Z74.09]   Referral Source:  Ethel Kauffman MD     Date of Initial Visit:  9/3/2024 Attended Visits:  6 Missed Visits:  0     SUMMARY OF TREATMENT/ASSESSMENT:    Gait/balance: LE weakness, neuropathy.      CURRENT STATUS/GOALS    Short Term Goals: To be accomplished in 8 treatments.   Independent with HEP including balance, endurance, LE ROM   Improve speed of gait  to 1.0.  AD as needed.    Sit to Stand- 9+ reps in 30 seconds  Long Term Goals: To be accomplished in 20 treatments.  Single limb balance 5 seconds  Speed of gait > 1.0 m/s  Step over a shoe box without LOB.    RECOMMENDATIONS FOR SKILLED THERAPY  ***        April Ana Cristina Guillermo, PT       10/22/2024       10:45 AM    If you have any questions/comments please contact us directly at [phone].   Thank you for allowing us to assist in the care of your patient.

## 2024-10-29 ENCOUNTER — HOSPITAL ENCOUNTER (OUTPATIENT)
Facility: HOSPITAL | Age: 59
Setting detail: RECURRING SERIES
Discharge: HOME | End: 2024-11-01
Attending: STUDENT IN AN ORGANIZED HEALTH CARE EDUCATION/TRAINING PROGRAM
Payer: MEDICAID

## 2024-10-29 PROCEDURE — 97112 NEUROMUSCULAR REEDUCATION: CPT | Performed by: PHYSICAL THERAPIST

## 2024-10-29 PROCEDURE — 97110 THERAPEUTIC EXERCISES: CPT | Performed by: PHYSICAL THERAPIST

## 2024-10-29 NOTE — PROGRESS NOTES
PHYSICAL THERAPY - DAILY TREATMENT NOTE (updated 3/23)      Date: 10/29/2024          Patient Name:  Checo Dey :  1965   Medical   Diagnosis:  Bilateral leg weakness [R29.898]  Bilateral foot pain [M79.671, M79.672]  Impaired physical mobility [Z74.09] Treatment Diagnosis:  Bilateral leg weakness [R29.898]  Bilateral foot pain [M79.671, M79.672]  Impaired physical mobility [Z74.09]   Referral Source:  Ethel Kauffman MD Insurance:   Payor: [facility] MEDICAID / Plan: [facility] HEALTHKEEPERS PLUS / Product Type: *No Product type* /                     Patient  verified yes     Visit #   Current  / Total 7 8   Time   In / Out 9 945 am    Total Treatment Time    Total Timed Codes          SUBJECTIVE    Pain Level (0-10 scale): 8    Any medication changes, allergies to medications, adverse drug reactions, diagnosis change, or new procedure performed?: [x] No    [] Yes (see summary sheet for update)  Medications: Verified on Patient Summary List    Subjective functional status/changes:       Patient he is in a lot of pain today.   Drove self to PT.       OBJECTIVE      Therapeutic Procedures:  Tx Min Billable or 1:1 Min (if diff from Tx Min) Procedure, Rationale, Specifics   25  48240 Therapeutic Exercise (timed):  increase ROM, strength, coordination, balance, and proprioception to improve patient's ability to progress to PLOF and address remaining functional goals. (see flow sheet as applicable)     Details if applicable:     15  53587 Neuromuscular Re-Education (timed):  improve balance, coordination, kinesthetic sense, posture, core stability and proprioception to improve patient's ability to develop conscious control of individual muscles and awareness of position of extremities in order to progress to PLOF and address remaining functional goals. (see flow sheet as applicable)     Details if applicable:           Details if applicable:           Details if applicable:

## 2024-11-04 ENCOUNTER — TELEPHONE (OUTPATIENT)
Facility: CLINIC | Age: 59
End: 2024-11-04

## 2024-11-04 NOTE — TELEPHONE ENCOUNTER
Patient called in stating he has fallen mulitple times and has had to call 911 and wanted to let you all know

## 2024-11-06 ENCOUNTER — HOSPITAL ENCOUNTER (EMERGENCY)
Facility: HOSPITAL | Age: 59
Discharge: HOME | End: 2024-11-06
Payer: MEDICAID

## 2024-11-06 ENCOUNTER — APPOINTMENT (OUTPATIENT)
Facility: HOSPITAL | Age: 59
End: 2024-11-06
Payer: MEDICAID

## 2024-11-06 VITALS
HEART RATE: 91 BPM | SYSTOLIC BLOOD PRESSURE: 198 MMHG | DIASTOLIC BLOOD PRESSURE: 95 MMHG | RESPIRATION RATE: 16 BRPM | BODY MASS INDEX: 30.61 KG/M2 | OXYGEN SATURATION: 100 % | TEMPERATURE: 98 F | HEIGHT: 67 IN | WEIGHT: 195 LBS

## 2024-11-06 DIAGNOSIS — S32.000A LUMBAR COMPRESSION FRACTURE, CLOSED, INITIAL ENCOUNTER (HCC): Primary | ICD-10-CM
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
## 2024-11-06 PROCEDURE — 99284 EMERGENCY DEPT VISIT MOD MDM: CPT

## 2024-11-06 PROCEDURE — 6370000000 HC RX 637 (ALT 250 FOR IP)

## 2024-11-06 PROCEDURE — 6360000002 HC RX W HCPCS

## 2024-11-06 PROCEDURE — 96372 THER/PROPH/DIAG INJ SC/IM: CPT

## 2024-11-06 PROCEDURE — 72131 CT LUMBAR SPINE W/O DYE: CPT

## 2024-11-06 RX ORDER — KETOROLAC TROMETHAMINE 15 MG/ML
15 INJECTION, SOLUTION INTRAMUSCULAR; INTRAVENOUS ONCE
Status: COMPLETED | OUTPATIENT
Start: 2024-11-06 | End: 2024-11-06

## 2024-11-06 RX ORDER — GABAPENTIN 300 MG/1
600 CAPSULE ORAL ONCE
Status: COMPLETED | OUTPATIENT
Start: 2024-11-06 | End: 2024-11-06

## 2024-11-06 RX ORDER — ONDANSETRON 4 MG/1
4 TABLET, ORALLY DISINTEGRATING ORAL ONCE
Status: COMPLETED | OUTPATIENT
Start: 2024-11-06 | End: 2024-11-06

## 2024-11-06 RX ADMIN — KETOROLAC TROMETHAMINE 15 MG: 15 INJECTION, SOLUTION INTRAMUSCULAR; INTRAVENOUS at 11:39

## 2024-11-06 RX ADMIN — GABAPENTIN 600 MG: 300 CAPSULE ORAL at 11:39

## 2024-11-06 RX ADMIN — ONDANSETRON 4 MG: 4 TABLET, ORALLY DISINTEGRATING ORAL at 11:05

## 2024-11-06 ASSESSMENT — PAIN SCALES - GENERAL
PAINLEVEL_OUTOF10: 0
PAINLEVEL_OUTOF10: 4
PAINLEVEL_OUTOF10: 10

## 2024-11-06 ASSESSMENT — PAIN - FUNCTIONAL ASSESSMENT: PAIN_FUNCTIONAL_ASSESSMENT: 0-10

## 2024-11-06 ASSESSMENT — PAIN DESCRIPTION - PAIN TYPE: TYPE: CHRONIC PAIN

## 2024-11-06 ASSESSMENT — PAIN DESCRIPTION - ORIENTATION: ORIENTATION: LOWER

## 2024-11-06 ASSESSMENT — PAIN DESCRIPTION - DESCRIPTORS: DESCRIPTORS: SHARP;SHOOTING

## 2024-11-06 ASSESSMENT — PAIN DESCRIPTION - LOCATION: LOCATION: BACK

## 2024-11-06 NOTE — ED TRIAGE NOTES
EMS call went out for severe lower back pain. Pt states it is chronic, but 10/10   He has received \"steroid\" injections recently but nothing has helped

## 2024-11-06 NOTE — ED PROVIDER NOTES
[facility] EMERGENCY DEPT  EMERGENCY DEPARTMENT HISTORY AND PHYSICAL EXAM      Date: 11/6/2024  Patient Name: Checo Dey  MRN: [id number]  Birthdate 1965  Date of evaluation: 11/6/2024  Provider: NEDA Mendoza   Note Started: 8:38 AM EST 11/6/24    HISTORY OF PRESENT ILLNESS     Chief Complaint   Patient presents with    Back Pain       History Provided By: Patient    HPI: Checo Dey is a 59 y.o. male with past medical history as listed and reviewed below who presents to the ED complaining of low back and leg pain. He states that he has a longstanding history of low back pain and severe neuropathy and is being followed by several specialists for this, including going to physical therapy and pain management (Dr. Gunderson). He reports that he recently had an MRI of his back. Chart review does show that on 10/15/2024 L-spine MRI showed \"HNP at L2-3, L3-4, L4-5, L5-S1 with corresponding FJA.\"    He states that he lost his balance twice last night at home and fell to the carpeted ground. He has had several recent mechanical falls due to lack of balance and is being treated by PT for this. He denies hitting his head, loss of consciousness, or blood thinner use; however, he does state that he is unsure how long he may have been on the ground for until he was able to call someone on the phone for help. He denies any chest pain, shortness of breath, abdominal pain, headache, or n/v/d. He denies any saddle anesthesia or change in bowel/bladder function. He localizes the pain to his bilateral lower back with radiation into the bilateral lower extremities. He describes it as a burning, pins and needles sensation which is aggravated by certain movements and positions. He rates it as 10/10. He has not taken any of his home medications this morning.     PAST MEDICAL HISTORY   Past Medical History:  Past Medical History:   Diagnosis Date    Bilateral lower extremity edema     Gastrointestinal disorder     Hypertension

## 2024-11-06 NOTE — DISCHARGE INSTRUCTIONS
Thank you for choosing our Emergency Department for your care.  It is our privilege to care for you in your time of need.  In the next several days, you may receive a survey via email or mailed to your home about your experience with our team.  We would greatly appreciate you taking a few minutes to complete the survey, as we use this information to learn what we have done well and what we could be doing better. Thank you for trusting us with your care!    Below you will find a list of your tests from today's visit.   Labs  No results found for this or any previous visit (from the past 12 hour(s)).    Radiologic Studies  CT LUMBAR SPINE WO CONTRAST   Final Result      1. Acute mild compression fracture of L1.   2. Multilevel spondylosis as above.         Electronically signed by USAMA MUNOZ        ------------------------------------------------------------------------------------------------------------  The evaluation and treatment you received in the Emergency Department were for an urgent problem. It is important that you follow-up with a doctor, nurse practitioner, or physician assistant to:  (1) confirm your diagnosis,  (2) re-evaluation of changes in your illness and treatment, and (3) for ongoing care. Please take your discharge instructions with you when you go to your follow-up appointment.     If you have any problem arranging a follow-up appointment, contact us!  If your symptoms become worse or you do not improve as expected, please return to us. We are available 24 hours a day.     If a prescription has been provided, please fill it as soon as possible to prevent a delay in treatment. If you have any questions or reservations about taking the medication due to side effects or interactions with other medications, please call your primary care provider or contact us directly.  Again, THANK YOU for choosing us to care for YOU!

## 2024-11-11 ENCOUNTER — TELEPHONE (OUTPATIENT)
Facility: CLINIC | Age: 59
End: 2024-11-11

## 2024-11-11 NOTE — TELEPHONE ENCOUNTER
Patient called crying stating he is in excruciating pain because he fell and went to the ER on 11-6-2024 and has a lumbar compression fracture in his back and the gabapentin is not helping at all.      He said can you please help him and prescribe something that will help him with this pain.  He is crying because he is in so much pain.  He cannot lay down because it hurts so bad.    He still has not received the brace they ordered for him.    He has to call and get an appointment scheduled with Satish Pryor.    Please advise.

## 2024-11-12 DIAGNOSIS — S32.000D COMPRESSION FRACTURE OF LUMBAR VERTEBRA WITH ROUTINE HEALING, UNSPECIFIED LUMBAR VERTEBRAL LEVEL, SUBSEQUENT ENCOUNTER: Primary | ICD-10-CM

## 2024-11-12 RX ORDER — HYDROCODONE BITARTRATE AND ACETAMINOPHEN 5; 325 MG/1; MG/1
1 TABLET ORAL EVERY 12 HOURS PRN
Qty: 10 TABLET | Refills: 0 | Status: SHIPPED | OUTPATIENT
Start: 2024-11-12 | End: 2024-11-17

## 2024-11-18 DIAGNOSIS — K21.9 GASTROESOPHAGEAL REFLUX DISEASE, UNSPECIFIED WHETHER ESOPHAGITIS PRESENT: ICD-10-CM

## 2024-11-18 NOTE — TELEPHONE ENCOUNTER
Patient needs a Refill on pantoprazole sent to [facility] [address] as this is his new pharmacy

## 2024-11-20 RX ORDER — PANTOPRAZOLE SODIUM 40 MG/1
40 TABLET, DELAYED RELEASE ORAL DAILY
Qty: 90 TABLET | Refills: 1 | Status: SHIPPED | OUTPATIENT
Start: 2024-11-20

## 2024-11-21 ENCOUNTER — TELEPHONE (OUTPATIENT)
Facility: CLINIC | Age: 59
End: 2024-11-21

## 2024-11-21 DIAGNOSIS — M79.605 BILATERAL LEG PAIN: ICD-10-CM

## 2024-11-21 DIAGNOSIS — G62.9 NEUROPATHY: ICD-10-CM

## 2024-11-21 DIAGNOSIS — M79.604 BILATERAL LEG PAIN: ICD-10-CM

## 2024-11-21 RX ORDER — ACETAMINOPHEN 500 MG
500 TABLET ORAL EVERY 8 HOURS PRN
Qty: 90 TABLET | Refills: 1 | Status: SHIPPED | OUTPATIENT
Start: 2024-11-21

## 2024-11-21 NOTE — TELEPHONE ENCOUNTER
Patient is requesting a refill on     Acetaminophen 500 mg Qty 90 day    Send to [facility]       He also stated that his spine doctor has upped his gabapentin to 800 mg take 1 tablet 3 times a day. He would like a refill on this as well.

## 2024-11-22 NOTE — TELEPHONE ENCOUNTER
Per Dr. Kauffman  He needs to get refills of gabapentin from the spine doctor as  he increased the dose and sent the last dose

## 2024-11-26 ENCOUNTER — TELEPHONE (OUTPATIENT)
Facility: CLINIC | Age: 59
End: 2024-11-26

## 2024-11-26 NOTE — TELEPHONE ENCOUNTER
[facility] [employer] called and stated that need Medical notes to be faxed to         [phone]

## 2024-11-27 NOTE — TELEPHONE ENCOUNTER
2nd attempt to contact pt to relay message unable to leave VM. I am going to close this encounter if pt calls back please relay.

## 2024-12-03 ENCOUNTER — TELEPHONE (OUTPATIENT)
Facility: CLINIC | Age: 59
End: 2024-12-03

## 2024-12-03 ENCOUNTER — CLINICAL DOCUMENTATION (OUTPATIENT)
Age: 59
End: 2024-12-03

## 2024-12-03 NOTE — PROGRESS NOTES
Referred by Shirley Patton MD. Dx: Hepatomegaly. Records in EPIC. Called patient to schedule New Patient appt. Patient declined to schedule at this time, stating he has neuropathy and a fractured back and is not willing to drive to [place] at this time. Referring provider notified via fax.

## 2024-12-10 ENCOUNTER — OFFICE VISIT (OUTPATIENT)
Facility: CLINIC | Age: 59
End: 2024-12-10
Payer: MEDICAID

## 2024-12-10 ENCOUNTER — TELEPHONE (OUTPATIENT)
Facility: CLINIC | Age: 59
End: 2024-12-10

## 2024-12-10 VITALS
OXYGEN SATURATION: 93 % | TEMPERATURE: 97.6 F | DIASTOLIC BLOOD PRESSURE: 76 MMHG | HEART RATE: 91 BPM | WEIGHT: 198.6 LBS | SYSTOLIC BLOOD PRESSURE: 125 MMHG | BODY MASS INDEX: 31.17 KG/M2 | RESPIRATION RATE: 18 BRPM | HEIGHT: 67 IN

## 2024-12-10 DIAGNOSIS — E53.8 B12 DEFICIENCY: ICD-10-CM

## 2024-12-10 DIAGNOSIS — F10.90 ALCOHOL USE DISORDER: Primary | ICD-10-CM

## 2024-12-10 DIAGNOSIS — R25.2 MUSCLE CRAMPS: ICD-10-CM

## 2024-12-10 DIAGNOSIS — I10 PRIMARY HYPERTENSION: ICD-10-CM

## 2024-12-10 DIAGNOSIS — F10.920 ALCOHOLIC INTOXICATION WITHOUT COMPLICATION (HCC): ICD-10-CM

## 2024-12-10 DIAGNOSIS — K21.9 GASTROESOPHAGEAL REFLUX DISEASE, UNSPECIFIED WHETHER ESOPHAGITIS PRESENT: ICD-10-CM

## 2024-12-10 DIAGNOSIS — F10.90 ALCOHOL USE DISORDER: ICD-10-CM

## 2024-12-10 DIAGNOSIS — D64.9 ANEMIA, UNSPECIFIED TYPE: ICD-10-CM

## 2024-12-10 PROCEDURE — 99214 OFFICE O/P EST MOD 30 MIN: CPT | Performed by: STUDENT IN AN ORGANIZED HEALTH CARE EDUCATION/TRAINING PROGRAM

## 2024-12-10 PROCEDURE — 3078F DIAST BP <80 MM HG: CPT | Performed by: STUDENT IN AN ORGANIZED HEALTH CARE EDUCATION/TRAINING PROGRAM

## 2024-12-10 PROCEDURE — 3074F SYST BP LT 130 MM HG: CPT | Performed by: STUDENT IN AN ORGANIZED HEALTH CARE EDUCATION/TRAINING PROGRAM

## 2024-12-10 RX ORDER — PANTOPRAZOLE SODIUM 40 MG/1
40 TABLET, DELAYED RELEASE ORAL DAILY
Qty: 90 TABLET | Refills: 3 | Status: SHIPPED | OUTPATIENT
Start: 2024-12-10

## 2024-12-10 RX ORDER — KETOCONAZOLE 2 G/100G
AEROSOL, FOAM TOPICAL
COMMUNITY

## 2024-12-10 RX ORDER — METOPROLOL TARTRATE 25 MG/1
25 TABLET, FILM COATED ORAL DAILY
Qty: 60 TABLET | Refills: 3 | Status: SHIPPED | OUTPATIENT
Start: 2024-12-10

## 2024-12-10 RX ORDER — GABAPENTIN 800 MG/1
800 TABLET ORAL 3 TIMES DAILY
COMMUNITY

## 2024-12-10 RX ORDER — MIDODRINE HYDROCHLORIDE 2.5 MG/1
TABLET ORAL
COMMUNITY
End: 2024-12-10

## 2024-12-10 RX ORDER — KETOPROFEN 50 MG/1
CAPSULE ORAL
COMMUNITY
End: 2024-12-10

## 2024-12-10 RX ORDER — LANOLIN ALCOHOL/MO/W.PET/CERES
400 CREAM (GRAM) TOPICAL DAILY
Qty: 90 TABLET | Refills: 1 | Status: SHIPPED | OUTPATIENT
Start: 2024-12-10

## 2024-12-10 RX ORDER — MICONAZOLE NITRATE 20 MG/G
CREAM TOPICAL
COMMUNITY

## 2024-12-10 ASSESSMENT — ENCOUNTER SYMPTOMS: SHORTNESS OF BREATH: 0

## 2024-12-10 NOTE — PROGRESS NOTES
Checo Dey (: 1965) is a 59 y.o. male, Established patient patient, here for evaluation of the following chief complaint(s):  Follow-up Chronic Condition       ASSESSMENT/PLAN:  Below is the assessment and plan developed based on review of pertinent history, physical exam, labs, studies, and medications.    1. Alcohol use disorder  -     Comprehensive Metabolic Panel; Future  2. B12 deficiency  -     Vitamin B12; Future  3. Alcoholic intoxication without complication (HCC)  4. Gastroesophageal reflux disease, unspecified whether esophagitis present  -     pantoprazole (PROTONIX) 40 MG tablet; Take 1 tablet by mouth daily, Disp-90 tablet, R-3Normal  5. Anemia, unspecified type  -     CBC; Future  -     Ferritin; Future  -     Folate; Future  -     Iron and TIBC; Future  6. Primary hypertension  -     metoprolol tartrate (LOPRESSOR) 25 MG tablet; Take 1 tablet by mouth daily, Disp-60 tablet, R-3Normal  7. Muscle cramps  -     magnesium oxide (MAG-OX) 400 (240 Mg) MG tablet; Take 1 tablet by mouth daily, Disp-90 tablet, R-1Normal      He has alcohol use disorder, intoxicated , smells of alcohol during visit today.  Able to answer questions, he drove to his visit today.  I told him that we cannot let him drive back because it is not safe for him as well as for others on the road.  Initially refusing, he was told that we would have to call 911 if he refuses. So his mother was called, who came to pick him up.  When discussing about alcohol, he is refusing to answer further questions.  Will likely benefit from referral to addiction health, will message nurse to ask him if he is okay with this.  As he was not willing to discuss about this further during visit.  Has neuropathy, likely alcohol related, was given referral to neurology in the past.  He was referred to hepatology for hepatomegaly, does not have an appointment yet.  Possible chronic cholecystitis with mild CBD dilatation-he was given a referral to

## 2024-12-10 NOTE — PROGRESS NOTES
\"Have you been to the ER, urgent care clinic since your last visit?  Hospitalized since your last visit?\"    YES - When: approximately 11/06/2024 ago.  Where and Why: [facility] for back fracture .    “Have you seen or consulted any other health care providers outside of [facility] since your last visit?”    YES - When: approximately 12/03/2024 ago.  Where and Why: Dr. Broderick for a follow up.        “Have you had a colorectal cancer screening such as a colonoscopy/FIT/Cologuard?    NO    No colonoscopy on file  No cologuard on file  No FIT/FOBT on file   No flexible sigmoidoscopy on file     Chief Complaint   Patient presents with    Follow-up Chronic Condition     /76 (Site: Left Upper Arm, Position: Sitting, Cuff Size: Medium Adult)   Pulse 91   Temp 97.6 °F (36.4 °C) (Temporal)   Resp 18   Ht 1.702 m (5' 7\")   Wt 90.1 kg (198 lb 9.6 oz)   SpO2 93%   BMI 31.11 kg/m²       Click Here for Release of Records Request

## 2024-12-10 NOTE — TELEPHONE ENCOUNTER
He would benefit from referral to addiction health to help with his alcohol use if he is okay I will place this referral.

## 2025-01-01 ENCOUNTER — OFFICE VISIT (OUTPATIENT)
Facility: CLINIC | Age: 60
End: 2025-01-01
Payer: MEDICAID

## 2025-01-01 VITALS
TEMPERATURE: 98 F | RESPIRATION RATE: 16 BRPM | SYSTOLIC BLOOD PRESSURE: 128 MMHG | BODY MASS INDEX: 28.56 KG/M2 | HEART RATE: 120 BPM | WEIGHT: 182 LBS | HEIGHT: 67 IN | OXYGEN SATURATION: 98 % | DIASTOLIC BLOOD PRESSURE: 88 MMHG

## 2025-01-01 DIAGNOSIS — J30.9 ALLERGIC RHINITIS, UNSPECIFIED SEASONALITY, UNSPECIFIED TRIGGER: ICD-10-CM

## 2025-01-01 DIAGNOSIS — K21.9 GASTROESOPHAGEAL REFLUX DISEASE, UNSPECIFIED WHETHER ESOPHAGITIS PRESENT: ICD-10-CM

## 2025-01-01 DIAGNOSIS — Z12.11 SCREENING FOR COLON CANCER: ICD-10-CM

## 2025-01-01 DIAGNOSIS — F10.90 ALCOHOL USE DISORDER: ICD-10-CM

## 2025-01-01 DIAGNOSIS — I10 PRIMARY HYPERTENSION: ICD-10-CM

## 2025-01-01 DIAGNOSIS — G62.9 PERIPHERAL POLYNEUROPATHY: Primary | ICD-10-CM

## 2025-01-01 DIAGNOSIS — E53.8 FOLATE DEFICIENCY: ICD-10-CM

## 2025-01-01 DIAGNOSIS — E53.8 B12 DEFICIENCY: ICD-10-CM

## 2025-01-01 DIAGNOSIS — D64.9 ANEMIA, UNSPECIFIED TYPE: ICD-10-CM

## 2025-01-01 DIAGNOSIS — G62.9 PERIPHERAL POLYNEUROPATHY: ICD-10-CM

## 2025-01-01 DIAGNOSIS — R79.89 ELEVATED LFTS: ICD-10-CM

## 2025-01-01 PROCEDURE — 3074F SYST BP LT 130 MM HG: CPT | Performed by: STUDENT IN AN ORGANIZED HEALTH CARE EDUCATION/TRAINING PROGRAM

## 2025-01-01 PROCEDURE — 99214 OFFICE O/P EST MOD 30 MIN: CPT | Performed by: STUDENT IN AN ORGANIZED HEALTH CARE EDUCATION/TRAINING PROGRAM

## 2025-01-01 PROCEDURE — 3079F DIAST BP 80-89 MM HG: CPT | Performed by: STUDENT IN AN ORGANIZED HEALTH CARE EDUCATION/TRAINING PROGRAM

## 2025-01-01 RX ORDER — CETIRIZINE HYDROCHLORIDE 10 MG/1
10 TABLET ORAL DAILY PRN
Qty: 30 TABLET | Refills: 2 | Status: SHIPPED | OUTPATIENT
Start: 2025-01-01 | End: 2025-08-29

## 2025-01-01 RX ORDER — PANTOPRAZOLE SODIUM 40 MG/1
40 TABLET, DELAYED RELEASE ORAL DAILY
Qty: 90 TABLET | Refills: 3 | Status: SHIPPED | OUTPATIENT
Start: 2025-01-01 | End: 2025-08-29

## 2025-01-01 RX ORDER — METOPROLOL TARTRATE 25 MG/1
25 TABLET, FILM COATED ORAL DAILY
Qty: 90 TABLET | Refills: 3 | Status: SHIPPED | OUTPATIENT
Start: 2025-01-01 | End: 2025-08-29

## 2025-01-01 RX ORDER — THIAMINE MONONITRATE (VIT B1) 100 MG
100 TABLET ORAL DAILY
Qty: 90 TABLET | Refills: 2 | Status: SHIPPED | OUTPATIENT
Start: 2025-01-01 | End: 2025-08-29

## 2025-01-01 RX ORDER — FOLIC ACID 1 MG/1
1 TABLET ORAL DAILY
Qty: 90 TABLET | Refills: 2 | Status: SHIPPED | OUTPATIENT
Start: 2025-01-01 | End: 2025-08-29

## 2025-01-01 RX ORDER — FLUTICASONE PROPIONATE 50 MCG
1 SPRAY, SUSPENSION (ML) NASAL DAILY PRN
Qty: 32 G | Refills: 1 | Status: SHIPPED | OUTPATIENT
Start: 2025-01-01 | End: 2025-08-29

## 2025-02-12 DIAGNOSIS — E53.8 B12 DEFICIENCY: ICD-10-CM

## 2025-02-12 NOTE — TELEPHONE ENCOUNTER
Patient came by office requesting a refill for the following medication:        vitamin B-12 (CYANOCOBALAMIN) 1000 MCG tablet   90 tablet  1 refill          Please send to [facility] in [place], [place]

## 2025-02-13 ENCOUNTER — TRANSCRIBE ORDERS (OUTPATIENT)
Facility: HOSPITAL | Age: 60
End: 2025-02-13

## 2025-02-13 DIAGNOSIS — M80.08XA AGE-RELATED OSTEOPOROSIS WITH CURRENT PATHOLOGICAL FRACTURE, VERTEBRA(E), INITIAL ENCOUNTER FOR FRACTURE (HCC): Primary | ICD-10-CM

## 2025-02-13 RX ORDER — LANOLIN ALCOHOL/MO/W.PET/CERES
1000 CREAM (GRAM) TOPICAL DAILY
Qty: 90 TABLET | Refills: 1 | Status: SHIPPED | OUTPATIENT
Start: 2025-02-13

## 2025-03-20 ENCOUNTER — TELEPHONE (OUTPATIENT)
Age: 60
End: 2025-03-20

## 2025-04-02 ENCOUNTER — OFFICE VISIT (OUTPATIENT)
Facility: CLINIC | Age: 60
End: 2025-04-02
Payer: MEDICAID

## 2025-04-02 VITALS
HEIGHT: 67 IN | BODY MASS INDEX: 31.08 KG/M2 | HEART RATE: 86 BPM | DIASTOLIC BLOOD PRESSURE: 79 MMHG | OXYGEN SATURATION: 98 % | SYSTOLIC BLOOD PRESSURE: 129 MMHG | WEIGHT: 198 LBS | RESPIRATION RATE: 16 BRPM | TEMPERATURE: 98 F

## 2025-04-02 DIAGNOSIS — F10.90 ALCOHOL USE DISORDER: ICD-10-CM

## 2025-04-02 DIAGNOSIS — R21 RASH: ICD-10-CM

## 2025-04-02 DIAGNOSIS — E53.8 B12 DEFICIENCY: ICD-10-CM

## 2025-04-02 DIAGNOSIS — I10 PRIMARY HYPERTENSION: Primary | ICD-10-CM

## 2025-04-02 DIAGNOSIS — I10 PRIMARY HYPERTENSION: ICD-10-CM

## 2025-04-02 DIAGNOSIS — R16.0 HEPATOMEGALY: ICD-10-CM

## 2025-04-02 DIAGNOSIS — G62.9 PERIPHERAL POLYNEUROPATHY: ICD-10-CM

## 2025-04-02 PROCEDURE — 3074F SYST BP LT 130 MM HG: CPT | Performed by: STUDENT IN AN ORGANIZED HEALTH CARE EDUCATION/TRAINING PROGRAM

## 2025-04-02 PROCEDURE — 99214 OFFICE O/P EST MOD 30 MIN: CPT | Performed by: STUDENT IN AN ORGANIZED HEALTH CARE EDUCATION/TRAINING PROGRAM

## 2025-04-02 PROCEDURE — 3078F DIAST BP <80 MM HG: CPT | Performed by: STUDENT IN AN ORGANIZED HEALTH CARE EDUCATION/TRAINING PROGRAM

## 2025-04-02 RX ORDER — DICLOFENAC SODIUM 75 MG/1
75 TABLET, DELAYED RELEASE ORAL 2 TIMES DAILY
COMMUNITY
Start: 2025-03-04

## 2025-04-02 RX ORDER — GABAPENTIN 800 MG/1
800 TABLET ORAL 3 TIMES DAILY
Qty: 90 TABLET | Refills: 0 | Status: SHIPPED | OUTPATIENT
Start: 2025-04-02 | End: 2025-05-02

## 2025-04-02 RX ORDER — METOPROLOL TARTRATE 25 MG/1
25 TABLET, FILM COATED ORAL DAILY
Qty: 90 TABLET | Refills: 1 | Status: SHIPPED | OUTPATIENT
Start: 2025-04-02

## 2025-04-02 RX ORDER — LANOLIN ALCOHOL/MO/W.PET/CERES
1000 CREAM (GRAM) TOPICAL DAILY
Qty: 90 TABLET | Refills: 1 | Status: SHIPPED | OUTPATIENT
Start: 2025-04-02

## 2025-04-02 SDOH — ECONOMIC STABILITY: FOOD INSECURITY: WITHIN THE PAST 12 MONTHS, YOU WORRIED THAT YOUR FOOD WOULD RUN OUT BEFORE YOU GOT MONEY TO BUY MORE.: NEVER TRUE

## 2025-04-02 SDOH — ECONOMIC STABILITY: FOOD INSECURITY: WITHIN THE PAST 12 MONTHS, THE FOOD YOU BOUGHT JUST DIDN'T LAST AND YOU DIDN'T HAVE MONEY TO GET MORE.: NEVER TRUE

## 2025-04-02 ASSESSMENT — ENCOUNTER SYMPTOMS
ABDOMINAL PAIN: 0
SHORTNESS OF BREATH: 0
COUGH: 0

## 2025-04-02 ASSESSMENT — PATIENT HEALTH QUESTIONNAIRE - PHQ9
2. FEELING DOWN, DEPRESSED OR HOPELESS: NOT AT ALL
SUM OF ALL RESPONSES TO PHQ QUESTIONS 1-9: 0
1. LITTLE INTEREST OR PLEASURE IN DOING THINGS: NOT AT ALL
SUM OF ALL RESPONSES TO PHQ QUESTIONS 1-9: 0

## 2025-04-02 NOTE — PROGRESS NOTES
Checo Dey (: 1965) is a 59 y.o. male, Established patient patient, here for evaluation of the following chief complaint(s):  Follow-up Chronic Condition       ASSESSMENT/PLAN:  Below is the assessment and plan developed based on review of pertinent history, physical exam, labs, studies, and medications.    1. Primary hypertension  -     metoprolol tartrate (LOPRESSOR) 25 MG tablet; Take 1 tablet by mouth daily, Disp-90 tablet, R-1Normal  -     CBC; Future  -     Comprehensive Metabolic Panel; Future  -     Lipid Panel; Future  2. B12 deficiency  -     vitamin B-12 (CYANOCOBALAMIN) 1000 MCG tablet; Take 1 tablet by mouth daily, Disp-90 tablet, R-1Normal  -     Vitamin B12; Future  3. Peripheral polyneuropathy  -     gabapentin (NEURONTIN) 800 MG tablet; Take 1 tablet by mouth 3 times daily for 30 days. Max Daily Amount: 2,400 mg, Disp-90 tablet, R-0Normal  -     Amb External Referral To Neurology  4. Hepatomegaly  -     [facility]  5. Alcohol use disorder  -     Folate; Future  -     [facility]  -     Amb External Referral To Behavioral Health  6. Rash  -     Amb External Referral To Dermatology      Significant alcohol use history, noncompliant with following up with recommendations.  Has not seen the neurologist, has not seen the hepatologist.  Continues to drink  He has neuropathy, he needs to see neurology, on gabapentin.  Could be related to alcohol use.  Refuses to stop drinking, states last drink was last night.  Given referral to addiction health.  I have put in referrals again, he does have hepatomegaly, given him referral to GI to evaluate for cirrhosis.    Has hypopigmentation of the right hand, unsure if etiology of this could be postinflammatory, given referral to dermatology.    Return in about 3 months (around 2025) for follow up.         SUBJECTIVE/OBJECTIVE:  HPI    Checo Dey is a 59 yr old

## 2025-04-02 NOTE — PROGRESS NOTES
Chief Complaint   Patient presents with    Follow-up Chronic Condition       /79 (BP Site: Left Upper Arm, Patient Position: Sitting)   Pulse 86   Temp 98 °F (36.7 °C) (Oral)   Resp 16   Ht 1.702 m (5' 7\")   Wt 89.8 kg (198 lb)   SpO2 98%   BMI 31.01 kg/m²         \"Have you been to the ER, urgent care clinic since your last visit?  Hospitalized since your last visit?\"    NO    “Have you seen or consulted any other health care providers outside our system since your last visit?”    NO      “Have you had a colorectal cancer screening such as a colonoscopy/FIT/Cologuard?    NO    No colonoscopy on file  No cologuard on file  No FIT/FOBT on file   No flexible sigmoidoscopy on file

## 2025-04-03 LAB
ALBUMIN SERPL-MCNC: 4.5 G/DL (ref 3.9–4.9)
ALP SERPL-CCNC: 166 IU/L (ref 44–121)
ALT SERPL-CCNC: 17 IU/L (ref 0–44)
AST SERPL-CCNC: 85 IU/L (ref 0–40)
BILIRUB SERPL-MCNC: 0.7 MG/DL (ref 0–1.2)
BUN SERPL-MCNC: 7 MG/DL (ref 8–27)
BUN/CREAT SERPL: 12 (ref 10–24)
CALCIUM SERPL-MCNC: 9 MG/DL (ref 8.6–10.2)
CHLORIDE SERPL-SCNC: 100 MMOL/L (ref 96–106)
CHOLEST SERPL-MCNC: 224 MG/DL (ref 100–199)
CO2 SERPL-SCNC: 24 MMOL/L (ref 20–29)
CREAT SERPL-MCNC: 0.6 MG/DL (ref 0.76–1.27)
EGFRCR SERPLBLD CKD-EPI 2021: 110 ML/MIN/1.73
ERYTHROCYTE [DISTWIDTH] IN BLOOD BY AUTOMATED COUNT: 20.8 % (ref 11.6–15.4)
GLOBULIN SER CALC-MCNC: 2.6 G/DL (ref 1.5–4.5)
GLUCOSE SERPL-MCNC: 78 MG/DL (ref 70–99)
HCT VFR BLD AUTO: 38.2 % (ref 37.5–51)
HDLC SERPL-MCNC: 108 MG/DL
HGB BLD-MCNC: 12.9 G/DL (ref 13–17.7)
LDLC SERPL CALC-MCNC: 96 MG/DL (ref 0–99)
MCH RBC QN AUTO: 38.5 PG (ref 26.6–33)
MCHC RBC AUTO-ENTMCNC: 33.8 G/DL (ref 31.5–35.7)
MCV RBC AUTO: 114 FL (ref 79–97)
PLATELET # BLD AUTO: 161 X10E3/UL (ref 150–450)
POTASSIUM SERPL-SCNC: 4.5 MMOL/L (ref 3.5–5.2)
PROT SERPL-MCNC: 7.1 G/DL (ref 6–8.5)
RBC # BLD AUTO: 3.35 X10E6/UL (ref 4.14–5.8)
SODIUM SERPL-SCNC: 143 MMOL/L (ref 134–144)
TRIGL SERPL-MCNC: 119 MG/DL (ref 0–149)
VIT B12 SERPL-MCNC: 1311 PG/ML (ref 232–1245)
VLDLC SERPL CALC-MCNC: 20 MG/DL (ref 5–40)
WBC # BLD AUTO: 4 X10E3/UL (ref 3.4–10.8)

## 2025-04-04 LAB — FOLATE SERPL-MCNC: <2 NG/ML

## 2025-04-15 ENCOUNTER — RESULTS FOLLOW-UP (OUTPATIENT)
Facility: CLINIC | Age: 60
End: 2025-04-15

## 2025-04-15 DIAGNOSIS — E53.8 FOLATE DEFICIENCY: Primary | ICD-10-CM

## 2025-04-15 RX ORDER — FOLIC ACID 1 MG/1
1 TABLET ORAL DAILY
Qty: 90 TABLET | Refills: 1 | Status: SHIPPED | OUTPATIENT
Start: 2025-04-15

## 2025-05-06 DIAGNOSIS — G62.9 PERIPHERAL POLYNEUROPATHY: ICD-10-CM

## 2025-05-06 NOTE — TELEPHONE ENCOUNTER
Patient is requesting a refill for         gabapentin (NEURONTIN) 800 MG tablet     Send to [facility]      He is asking to have enough to last him until his appointment.

## 2025-05-07 RX ORDER — GABAPENTIN 800 MG/1
800 TABLET ORAL 3 TIMES DAILY
Qty: 90 TABLET | Refills: 2 | Status: SHIPPED | OUTPATIENT
Start: 2025-05-07 | End: 2025-08-05

## 2025-06-28 ENCOUNTER — HOSPITAL ENCOUNTER (EMERGENCY)
Facility: HOSPITAL | Age: 60
Discharge: HOME | End: 2025-06-28
Attending: EMERGENCY MEDICINE
Payer: MEDICAID

## 2025-06-28 VITALS
HEART RATE: 105 BPM | OXYGEN SATURATION: 93 % | DIASTOLIC BLOOD PRESSURE: 69 MMHG | BODY MASS INDEX: 29.03 KG/M2 | WEIGHT: 185 LBS | RESPIRATION RATE: 19 BRPM | HEIGHT: 67 IN | TEMPERATURE: 98.1 F | SYSTOLIC BLOOD PRESSURE: 124 MMHG
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
## 2025-06-28 DIAGNOSIS — R60.0 PERIPHERAL EDEMA: ICD-10-CM

## 2025-06-28 DIAGNOSIS — S50.312A ABRASION OF LEFT ELBOW, INITIAL ENCOUNTER: ICD-10-CM

## 2025-06-28 PROCEDURE — 99283 EMERGENCY DEPT VISIT LOW MDM: CPT

## 2025-06-28 PROCEDURE — 6370000000 HC RX 637 (ALT 250 FOR IP): Performed by: EMERGENCY MEDICINE

## 2025-06-28 RX ORDER — ACETAMINOPHEN 500 MG
1000 TABLET ORAL
Status: COMPLETED | OUTPATIENT
Start: 2025-06-28 | End: 2025-06-28

## 2025-06-28 RX ORDER — GINSENG 100 MG
CAPSULE ORAL
Status: COMPLETED | OUTPATIENT
Start: 2025-06-28 | End: 2025-06-28

## 2025-06-28 RX ORDER — GABAPENTIN 100 MG/1
100 CAPSULE ORAL
Status: COMPLETED | OUTPATIENT
Start: 2025-06-28 | End: 2025-06-28

## 2025-06-28 RX ADMIN — BACITRACIN: 500 OINTMENT TOPICAL at 21:12

## 2025-06-28 RX ADMIN — ACETAMINOPHEN 1000 MG: 500 TABLET ORAL at 21:11

## 2025-06-28 RX ADMIN — GABAPENTIN 100 MG: 100 CAPSULE ORAL at 21:11

## 2025-06-28 ASSESSMENT — LIFESTYLE VARIABLES
HOW OFTEN DO YOU HAVE A DRINK CONTAINING ALCOHOL: 4 OR MORE TIMES A WEEK
HOW MANY STANDARD DRINKS CONTAINING ALCOHOL DO YOU HAVE ON A TYPICAL DAY: 3 OR 4

## 2025-06-28 ASSESSMENT — PAIN DESCRIPTION - ORIENTATION: ORIENTATION: RIGHT;LEFT

## 2025-06-28 ASSESSMENT — PAIN SCALES - GENERAL
PAINLEVEL_OUTOF10: 10
PAINLEVEL_OUTOF10: 10

## 2025-06-28 ASSESSMENT — PAIN DESCRIPTION - LOCATION: LOCATION: LEG

## 2025-06-28 ASSESSMENT — PAIN - FUNCTIONAL ASSESSMENT: PAIN_FUNCTIONAL_ASSESSMENT: 0-10

## 2025-06-28 ASSESSMENT — PAIN DESCRIPTION - PAIN TYPE: TYPE: CHRONIC PAIN

## 2025-06-29 NOTE — ED NOTES
Patient resting on stretcher with BP cuff on arm. Asked writer how much longer it would be on. Writer informed patient BP is being monitored. Patient replied well could you put it on my trace. Bp cuff removed from patients arm, to be put back on when BP is reassessed.

## 2025-06-29 NOTE — ED PROVIDER NOTES
[facility] EMERGENCY DEPT  EMERGENCY DEPARTMENT HISTORY AND PHYSICAL EXAM      Date: 6/28/2025  Patient Name: Checo Dye  MRN: [id number]  Birthdate 1965  Date of evaluation: 6/28/2025  Provider: Eufemia Kennedy MD  Note Started: 10:48 PM EDT 6/28/25    HISTORY OF PRESENT ILLNESS     Chief Complaint   Patient presents with    Fall    Alcohol Intoxication       History Provided By: Patient    HPI: Checo Dey is a 59 y.o. male.  Patient was brought to emergency room by EMS for a fall.  Patient stated that he fell asleep on the bed and fell off the bed landing on his left elbow.  Patient denies head injury.  No LOC.  Patient stated that he had few beers before he fell asleep.  No headache, chest pain, shortness of breath, abdominal pain, neck pain, or abdominal pain.  Superficial abrasion on the left elbow.  Patient also complains of chronic peripheral edema with drainage from blister for last several weeks.  No fever chills        PAST MEDICAL HISTORY   Past Medical History:  Past Medical History:   Diagnosis Date    Bilateral lower extremity edema     Gastrointestinal disorder     Hypertension        Past Surgical History:  Past Surgical History:   Procedure Laterality Date    APPENDECTOMY         Family History:  No family history on file.    Social History:  Social History     Tobacco Use    Smoking status: Every Day     Current packs/day: 0.50     Average packs/day: 0.5 packs/day for 31.2 years (15.6 ttl pk-yrs)     Types: Cigarettes     Start date: 4/9/1994    Smokeless tobacco: Never   Vaping Use    Vaping status: Never Used   Substance Use Topics    Alcohol use: Yes     Comment: 1-2 drinks per day    Drug use: Never       Allergies:  No Known Allergies    PCP: Ethel Kauffman MD    Current Meds:   No current facility-administered medications for this encounter.     Current Outpatient Medications   Medication Sig Dispense Refill    gabapentin (NEURONTIN) 800 MG tablet Take 1 tablet by mouth 3

## 2025-06-29 NOTE — ED NOTES
Wounds cleansed with wound cleanser. Bacitracin applied to abrasions on bilateral feet, left knee and left arm.

## 2025-06-29 NOTE — ED TRIAGE NOTES
Pt states fell out of bed this evening, denies head injury or LOC, pt reports ETOH, bilateral chronic lower extremity edema, small skin tear left elbow.

## 2025-08-06 DIAGNOSIS — G62.9 PERIPHERAL POLYNEUROPATHY: ICD-10-CM

## 2025-08-06 RX ORDER — GABAPENTIN 800 MG/1
800 TABLET ORAL 3 TIMES DAILY
Qty: 90 TABLET | Refills: 2 | Status: SHIPPED | OUTPATIENT
Start: 2025-08-06 | End: 2025-11-04